# Patient Record
Sex: FEMALE | Race: WHITE | Employment: UNEMPLOYED | URBAN - METROPOLITAN AREA
[De-identification: names, ages, dates, MRNs, and addresses within clinical notes are randomized per-mention and may not be internally consistent; named-entity substitution may affect disease eponyms.]

---

## 2019-09-06 LAB
ANION GAP SERPL CALCULATED.3IONS-SCNC: 13 MMOL/L (ref 10–20)
BICARBONATE: 28 MMOL/L (ref 21–32)
CALCIUM SERPL-MCNC: 9.5 MG/DL (ref 8.6–10.3)
CHLORIDE BLD-SCNC: 99 MMOL/L (ref 98–107)
CREAT SERPL-MCNC: 0.91 MG/DL (ref 0.5–1)
ERYTHROCYTE [DISTWIDTH] IN BLOOD BY AUTOMATED COUNT: 16.8 % (ref 11.5–14)
GFR AFRICAN AMERICAN: >60 ML/MIN/1.73M2
GFR NON-AFRICAN AMERICAN: >60 ML/MIN/1.73M2
GLUCOSE: 245 MG/DL (ref 74–99)
HCT VFR BLD CALC: 36.5 % (ref 36–46)
HEMOGLOBIN: 10.7 G/DL (ref 12–16)
MCHC RBC AUTO-ENTMCNC: 29.3 G/DL (ref 32–36)
MCV RBC AUTO: 77 FL (ref 80–100)
PLATELET # BLD: 492 X10E9/L (ref 150–450)
POTASSIUM SERPL-SCNC: 3.9 MMOL/L (ref 3.5–5.3)
RBC # BLD: 4.74 X10E12/L (ref 4–5.2)
SODIUM BLD-SCNC: 136 MMOL/L (ref 136–145)
UREA NITROGEN: 16 MG/DL (ref 6–23)
WBC: 8.6 X10E9/L (ref 4.4–11.3)

## 2019-09-13 LAB
ANION GAP SERPL CALCULATED.3IONS-SCNC: 19 MMOL/L (ref 10–20)
BICARBONATE: 25 MMOL/L (ref 21–32)
CALCIUM SERPL-MCNC: 9.8 MG/DL (ref 8.6–10.3)
CHLORIDE BLD-SCNC: 99 MMOL/L (ref 98–107)
CREAT SERPL-MCNC: 0.94 MG/DL (ref 0.5–1)
ERYTHROCYTE [DISTWIDTH] IN BLOOD BY AUTOMATED COUNT: 16.8 % (ref 11.5–14)
GFR AFRICAN AMERICAN: 73 ML/MIN/1.73M2
GFR NON-AFRICAN AMERICAN: 60 ML/MIN/1.73M2
GLUCOSE: 138 MG/DL (ref 74–99)
HCT VFR BLD CALC: 36.2 % (ref 36–46)
HEMOGLOBIN: 10.6 G/DL (ref 12–16)
MCHC RBC AUTO-ENTMCNC: 29.3 G/DL (ref 32–36)
MCV RBC AUTO: 78 FL (ref 80–100)
PLATELET # BLD: 486 X10E9/L (ref 150–450)
POTASSIUM SERPL-SCNC: 4.3 MMOL/L (ref 3.5–5.3)
RBC # BLD: 4.63 X10E12/L (ref 4–5.2)
SODIUM BLD-SCNC: 139 MMOL/L (ref 136–145)
UREA NITROGEN: 17 MG/DL (ref 6–23)
WBC: 7.3 X10E9/L (ref 4.4–11.3)

## 2019-09-19 ENCOUNTER — OFFICE VISIT (OUTPATIENT)
Dept: INFECTIOUS DISEASES | Age: 59
End: 2019-09-19

## 2019-09-19 VITALS
SYSTOLIC BLOOD PRESSURE: 186 MMHG | TEMPERATURE: 98.2 F | DIASTOLIC BLOOD PRESSURE: 85 MMHG | HEART RATE: 75 BPM | RESPIRATION RATE: 16 BRPM | HEIGHT: 66 IN | WEIGHT: 261.6 LBS | BODY MASS INDEX: 42.04 KG/M2

## 2019-09-19 DIAGNOSIS — E11.628 DIABETIC INFECTION OF LEFT FOOT (HCC): Primary | ICD-10-CM

## 2019-09-19 DIAGNOSIS — L08.9 DIABETIC INFECTION OF LEFT FOOT (HCC): Primary | ICD-10-CM

## 2019-09-19 PROCEDURE — 99213 OFFICE O/P EST LOW 20 MIN: CPT | Performed by: INTERNAL MEDICINE

## 2019-09-19 RX ORDER — GLIMEPIRIDE 4 MG/1
4 TABLET ORAL
COMMUNITY
End: 2020-08-11

## 2019-09-19 ASSESSMENT — ENCOUNTER SYMPTOMS
GASTROINTESTINAL NEGATIVE: 1
RESPIRATORY NEGATIVE: 1

## 2019-09-20 LAB
ANION GAP SERPL CALCULATED.3IONS-SCNC: 14 MMOL/L (ref 10–20)
BICARBONATE: 26 MMOL/L (ref 21–32)
CALCIUM SERPL-MCNC: 9.7 MG/DL (ref 8.6–10.3)
CHLORIDE BLD-SCNC: 101 MMOL/L (ref 98–107)
CREAT SERPL-MCNC: 0.89 MG/DL (ref 0.5–1)
ERYTHROCYTE [DISTWIDTH] IN BLOOD BY AUTOMATED COUNT: 16.2 % (ref 11.5–14)
GFR AFRICAN AMERICAN: >60 ML/MIN/1.73M2
GFR NON-AFRICAN AMERICAN: >60 ML/MIN/1.73M2
GLUCOSE: 194 MG/DL (ref 74–99)
HCT VFR BLD CALC: 34.9 % (ref 36–46)
HEMOGLOBIN: 10.3 G/DL (ref 12–16)
MCHC RBC AUTO-ENTMCNC: 29.5 G/DL (ref 32–36)
MCV RBC AUTO: 77 FL (ref 80–100)
PLATELET # BLD: 454 X10E9/L (ref 150–450)
POTASSIUM SERPL-SCNC: 4.4 MMOL/L (ref 3.5–5.3)
RBC # BLD: 4.52 X10E12/L (ref 4–5.2)
SODIUM BLD-SCNC: 137 MMOL/L (ref 136–145)
UREA NITROGEN: 15 MG/DL (ref 6–23)
WBC: 7.1 X10E9/L (ref 4.4–11.3)

## 2019-09-27 LAB
ANION GAP SERPL CALCULATED.3IONS-SCNC: 17 MMOL/L (ref 10–20)
BICARBONATE: 24 MMOL/L (ref 21–32)
CALCIUM SERPL-MCNC: 9.2 MG/DL (ref 8.6–10.3)
CHLORIDE BLD-SCNC: 101 MMOL/L (ref 98–107)
CREAT SERPL-MCNC: 1.03 MG/DL (ref 0.5–1)
ERYTHROCYTE [DISTWIDTH] IN BLOOD BY AUTOMATED COUNT: 16.5 % (ref 11.5–14)
GFR AFRICAN AMERICAN: 65 ML/MIN/1.73M2
GFR NON-AFRICAN AMERICAN: 54 ML/MIN/1.73M2
GLUCOSE: 179 MG/DL (ref 74–99)
HCT VFR BLD CALC: 37.4 % (ref 36–46)
HEMOGLOBIN: 11 G/DL (ref 12–16)
MCHC RBC AUTO-ENTMCNC: 29.4 G/DL (ref 32–36)
MCV RBC AUTO: 77 FL (ref 80–100)
PLATELET # BLD: 464 X10E9/L (ref 150–450)
POTASSIUM SERPL-SCNC: 4.5 MMOL/L (ref 3.5–5.3)
RBC # BLD: 4.83 X10E12/L (ref 4–5.2)
SODIUM BLD-SCNC: 137 MMOL/L (ref 136–145)
UREA NITROGEN: 22 MG/DL (ref 6–23)
WBC: 8.2 X10E9/L (ref 4.4–11.3)

## 2019-10-03 ENCOUNTER — OFFICE VISIT (OUTPATIENT)
Dept: INFECTIOUS DISEASES | Age: 59
End: 2019-10-03

## 2019-10-03 VITALS
BODY MASS INDEX: 41.3 KG/M2 | WEIGHT: 257 LBS | TEMPERATURE: 98.2 F | SYSTOLIC BLOOD PRESSURE: 158 MMHG | RESPIRATION RATE: 20 BRPM | HEART RATE: 79 BPM | HEIGHT: 66 IN | DIASTOLIC BLOOD PRESSURE: 80 MMHG

## 2019-10-03 DIAGNOSIS — L08.9 DIABETIC INFECTION OF LEFT FOOT (HCC): Primary | ICD-10-CM

## 2019-10-03 DIAGNOSIS — E11.628 DIABETIC INFECTION OF LEFT FOOT (HCC): Primary | ICD-10-CM

## 2019-10-03 PROCEDURE — 99213 OFFICE O/P EST LOW 20 MIN: CPT | Performed by: INTERNAL MEDICINE

## 2019-10-03 RX ORDER — DOXYCYCLINE HYCLATE 100 MG
100 TABLET ORAL 2 TIMES DAILY
Qty: 60 TABLET | Refills: 1 | Status: SHIPPED | OUTPATIENT
Start: 2019-10-03 | End: 2019-11-02

## 2019-10-03 ASSESSMENT — ENCOUNTER SYMPTOMS
GASTROINTESTINAL NEGATIVE: 1
RESPIRATORY NEGATIVE: 1

## 2020-08-02 ENCOUNTER — APPOINTMENT (OUTPATIENT)
Dept: GENERAL RADIOLOGY | Age: 60
DRG: 854 | End: 2020-08-02
Payer: COMMERCIAL

## 2020-08-02 ENCOUNTER — HOSPITAL ENCOUNTER (INPATIENT)
Age: 60
LOS: 2 days | Discharge: HOME | DRG: 854 | End: 2020-08-04
Attending: EMERGENCY MEDICINE | Admitting: INTERNAL MEDICINE
Payer: COMMERCIAL

## 2020-08-02 PROBLEM — S91.302A OPEN WOUND OF LEFT FOOT: Status: ACTIVE | Noted: 2020-08-02

## 2020-08-02 PROBLEM — N17.9 AKI (ACUTE KIDNEY INJURY) (HCC): Status: ACTIVE | Noted: 2020-08-02

## 2020-08-02 PROBLEM — L03.119 CELLULITIS OF FOOT: Status: ACTIVE | Noted: 2020-08-02

## 2020-08-02 LAB
ALBUMIN SERPL-MCNC: 4.1 G/DL (ref 3.5–4.6)
ALP BLD-CCNC: 88 U/L (ref 40–130)
ALT SERPL-CCNC: 28 U/L (ref 0–33)
ANION GAP SERPL CALCULATED.3IONS-SCNC: 13 MEQ/L (ref 9–15)
AST SERPL-CCNC: 24 U/L (ref 0–35)
BASOPHILS ABSOLUTE: 0.1 K/UL (ref 0–0.2)
BASOPHILS RELATIVE PERCENT: 0.5 %
BILIRUB SERPL-MCNC: 0.3 MG/DL (ref 0.2–0.7)
BUN BLDV-MCNC: 26 MG/DL (ref 8–23)
CALCIUM SERPL-MCNC: 9.4 MG/DL (ref 8.5–9.9)
CHLORIDE BLD-SCNC: 94 MEQ/L (ref 95–107)
CO2: 28 MEQ/L (ref 20–31)
CREAT SERPL-MCNC: 1.03 MG/DL (ref 0.5–0.9)
EOSINOPHILS ABSOLUTE: 0.2 K/UL (ref 0–0.7)
EOSINOPHILS RELATIVE PERCENT: 1.3 %
GFR AFRICAN AMERICAN: >60
GFR NON-AFRICAN AMERICAN: 54.2
GLOBULIN: 3.4 G/DL (ref 2.3–3.5)
GLUCOSE BLD-MCNC: 210 MG/DL (ref 60–115)
GLUCOSE BLD-MCNC: 219 MG/DL (ref 60–115)
GLUCOSE BLD-MCNC: 254 MG/DL (ref 60–115)
GLUCOSE BLD-MCNC: 269 MG/DL (ref 60–115)
GLUCOSE BLD-MCNC: 285 MG/DL (ref 70–99)
GLUCOSE BLD-MCNC: 303 MG/DL (ref 60–115)
HCT VFR BLD CALC: 38 % (ref 37–47)
HEMOGLOBIN: 12.3 G/DL (ref 12–16)
LYMPHOCYTES ABSOLUTE: 1.4 K/UL (ref 1–4.8)
LYMPHOCYTES RELATIVE PERCENT: 10.6 %
MCH RBC QN AUTO: 25.3 PG (ref 27–31.3)
MCHC RBC AUTO-ENTMCNC: 32.3 % (ref 33–37)
MCV RBC AUTO: 78.3 FL (ref 82–100)
MONOCYTES ABSOLUTE: 1.1 K/UL (ref 0.2–0.8)
MONOCYTES RELATIVE PERCENT: 8.1 %
NEUTROPHILS ABSOLUTE: 10.8 K/UL (ref 1.4–6.5)
NEUTROPHILS RELATIVE PERCENT: 79.5 %
PDW BLD-RTO: 15.9 % (ref 11.5–14.5)
PERFORMED ON: ABNORMAL
PLATELET # BLD: 380 K/UL (ref 130–400)
POTASSIUM SERPL-SCNC: 4.7 MEQ/L (ref 3.4–4.9)
PROCALCITONIN: 0.08 NG/ML (ref 0–0.15)
RBC # BLD: 4.85 M/UL (ref 4.2–5.4)
SODIUM BLD-SCNC: 135 MEQ/L (ref 135–144)
TOTAL PROTEIN: 7.5 G/DL (ref 6.3–8)
WBC # BLD: 13.5 K/UL (ref 4.8–10.8)

## 2020-08-02 PROCEDURE — 1210000000 HC MED SURG R&B

## 2020-08-02 PROCEDURE — 6370000000 HC RX 637 (ALT 250 FOR IP): Performed by: INTERNAL MEDICINE

## 2020-08-02 PROCEDURE — 6370000000 HC RX 637 (ALT 250 FOR IP): Performed by: HOSPITALIST

## 2020-08-02 PROCEDURE — 80053 COMPREHEN METABOLIC PANEL: CPT

## 2020-08-02 PROCEDURE — 6360000002 HC RX W HCPCS: Performed by: EMERGENCY MEDICINE

## 2020-08-02 PROCEDURE — 96365 THER/PROPH/DIAG IV INF INIT: CPT

## 2020-08-02 PROCEDURE — 99254 IP/OBS CNSLTJ NEW/EST MOD 60: CPT | Performed by: INTERNAL MEDICINE

## 2020-08-02 PROCEDURE — 84145 PROCALCITONIN (PCT): CPT

## 2020-08-02 PROCEDURE — 6360000002 HC RX W HCPCS: Performed by: HOSPITALIST

## 2020-08-02 PROCEDURE — 96375 TX/PRO/DX INJ NEW DRUG ADDON: CPT

## 2020-08-02 PROCEDURE — 6360000002 HC RX W HCPCS

## 2020-08-02 PROCEDURE — 36415 COLL VENOUS BLD VENIPUNCTURE: CPT

## 2020-08-02 PROCEDURE — 2580000003 HC RX 258: Performed by: HOSPITALIST

## 2020-08-02 PROCEDURE — 73630 X-RAY EXAM OF FOOT: CPT

## 2020-08-02 PROCEDURE — 85025 COMPLETE CBC W/AUTO DIFF WBC: CPT

## 2020-08-02 PROCEDURE — 99284 EMERGENCY DEPT VISIT MOD MDM: CPT

## 2020-08-02 PROCEDURE — 2580000003 HC RX 258: Performed by: EMERGENCY MEDICINE

## 2020-08-02 PROCEDURE — 6360000002 HC RX W HCPCS: Performed by: INTERNAL MEDICINE

## 2020-08-02 RX ORDER — ATORVASTATIN CALCIUM 20 MG/1
20 TABLET, FILM COATED ORAL DAILY
Status: DISCONTINUED | OUTPATIENT
Start: 2020-08-02 | End: 2020-08-04 | Stop reason: HOSPADM

## 2020-08-02 RX ORDER — POLYETHYLENE GLYCOL 3350 17 G/17G
17 POWDER, FOR SOLUTION ORAL DAILY PRN
Status: DISCONTINUED | OUTPATIENT
Start: 2020-08-02 | End: 2020-08-04 | Stop reason: HOSPADM

## 2020-08-02 RX ORDER — PROMETHAZINE HYDROCHLORIDE 12.5 MG/1
12.5 TABLET ORAL EVERY 6 HOURS PRN
Status: DISCONTINUED | OUTPATIENT
Start: 2020-08-02 | End: 2020-08-04 | Stop reason: HOSPADM

## 2020-08-02 RX ORDER — ACETAMINOPHEN 325 MG/1
650 TABLET ORAL EVERY 6 HOURS PRN
Status: DISCONTINUED | OUTPATIENT
Start: 2020-08-02 | End: 2020-08-04 | Stop reason: HOSPADM

## 2020-08-02 RX ORDER — DEXTROSE MONOHYDRATE 25 G/50ML
12.5 INJECTION, SOLUTION INTRAVENOUS PRN
Status: DISCONTINUED | OUTPATIENT
Start: 2020-08-02 | End: 2020-08-04 | Stop reason: HOSPADM

## 2020-08-02 RX ORDER — 0.9 % SODIUM CHLORIDE 0.9 %
1000 INTRAVENOUS SOLUTION INTRAVENOUS ONCE
Status: COMPLETED | OUTPATIENT
Start: 2020-08-02 | End: 2020-08-02

## 2020-08-02 RX ORDER — INSULIN GLARGINE 100 [IU]/ML
18 INJECTION, SOLUTION SUBCUTANEOUS NIGHTLY
Status: DISCONTINUED | OUTPATIENT
Start: 2020-08-02 | End: 2020-08-04 | Stop reason: HOSPADM

## 2020-08-02 RX ORDER — ONDANSETRON 2 MG/ML
4 INJECTION INTRAMUSCULAR; INTRAVENOUS EVERY 6 HOURS PRN
Status: DISCONTINUED | OUTPATIENT
Start: 2020-08-02 | End: 2020-08-04 | Stop reason: HOSPADM

## 2020-08-02 RX ORDER — INSULIN GLARGINE 100 [IU]/ML
12 INJECTION, SOLUTION SUBCUTANEOUS NIGHTLY
Status: DISCONTINUED | OUTPATIENT
Start: 2020-08-02 | End: 2020-08-02

## 2020-08-02 RX ORDER — INSULIN GLARGINE 100 [IU]/ML
18 INJECTION, SOLUTION SUBCUTANEOUS NIGHTLY
Status: DISCONTINUED | OUTPATIENT
Start: 2020-08-03 | End: 2020-08-02

## 2020-08-02 RX ORDER — DIPHENHYDRAMINE HYDROCHLORIDE 50 MG/ML
25 INJECTION INTRAMUSCULAR; INTRAVENOUS ONCE
Status: COMPLETED | OUTPATIENT
Start: 2020-08-02 | End: 2020-08-02

## 2020-08-02 RX ORDER — DIPHENHYDRAMINE HYDROCHLORIDE 50 MG/ML
INJECTION INTRAMUSCULAR; INTRAVENOUS
Status: COMPLETED
Start: 2020-08-02 | End: 2020-08-02

## 2020-08-02 RX ORDER — ATORVASTATIN CALCIUM 20 MG/1
20 TABLET, FILM COATED ORAL DAILY
COMMUNITY
End: 2020-08-11 | Stop reason: SINTOL

## 2020-08-02 RX ORDER — NICOTINE POLACRILEX 4 MG
15 LOZENGE BUCCAL PRN
Status: DISCONTINUED | OUTPATIENT
Start: 2020-08-02 | End: 2020-08-04 | Stop reason: HOSPADM

## 2020-08-02 RX ORDER — SODIUM CHLORIDE 0.9 % (FLUSH) 0.9 %
10 SYRINGE (ML) INJECTION PRN
Status: DISCONTINUED | OUTPATIENT
Start: 2020-08-02 | End: 2020-08-04 | Stop reason: HOSPADM

## 2020-08-02 RX ORDER — LISINOPRIL 20 MG/1
20 TABLET ORAL DAILY
Status: DISCONTINUED | OUTPATIENT
Start: 2020-08-02 | End: 2020-08-04 | Stop reason: HOSPADM

## 2020-08-02 RX ORDER — ACETAMINOPHEN 650 MG/1
650 SUPPOSITORY RECTAL EVERY 6 HOURS PRN
Status: DISCONTINUED | OUTPATIENT
Start: 2020-08-02 | End: 2020-08-04 | Stop reason: HOSPADM

## 2020-08-02 RX ORDER — DEXTROSE MONOHYDRATE 50 MG/ML
100 INJECTION, SOLUTION INTRAVENOUS PRN
Status: DISCONTINUED | OUTPATIENT
Start: 2020-08-02 | End: 2020-08-04 | Stop reason: HOSPADM

## 2020-08-02 RX ORDER — SODIUM CHLORIDE 0.9 % (FLUSH) 0.9 %
10 SYRINGE (ML) INJECTION EVERY 12 HOURS SCHEDULED
Status: DISCONTINUED | OUTPATIENT
Start: 2020-08-02 | End: 2020-08-04 | Stop reason: HOSPADM

## 2020-08-02 RX ADMIN — MEROPENEM 1 G: 1 INJECTION, POWDER, FOR SOLUTION INTRAVENOUS at 05:18

## 2020-08-02 RX ADMIN — INSULIN GLARGINE 12 UNITS: 100 INJECTION, SOLUTION SUBCUTANEOUS at 05:33

## 2020-08-02 RX ADMIN — INSULIN LISPRO 6 UNITS: 100 INJECTION, SOLUTION INTRAVENOUS; SUBCUTANEOUS at 12:08

## 2020-08-02 RX ADMIN — ACETAMINOPHEN 650 MG: 325 TABLET ORAL at 15:49

## 2020-08-02 RX ADMIN — MEROPENEM 1 G: 1 INJECTION, POWDER, FOR SOLUTION INTRAVENOUS at 20:48

## 2020-08-02 RX ADMIN — MEROPENEM 1 G: 1 INJECTION, POWDER, FOR SOLUTION INTRAVENOUS at 12:47

## 2020-08-02 RX ADMIN — SODIUM CHLORIDE 1000 ML: 9 INJECTION, SOLUTION INTRAVENOUS at 05:18

## 2020-08-02 RX ADMIN — INSULIN GLARGINE 18 UNITS: 100 INJECTION, SOLUTION SUBCUTANEOUS at 20:43

## 2020-08-02 RX ADMIN — DIPHENHYDRAMINE HYDROCHLORIDE 25 MG: 50 INJECTION INTRAMUSCULAR; INTRAVENOUS at 01:43

## 2020-08-02 RX ADMIN — LISINOPRIL 20 MG: 20 TABLET ORAL at 08:26

## 2020-08-02 RX ADMIN — ATORVASTATIN CALCIUM 20 MG: 20 TABLET, FILM COATED ORAL at 08:26

## 2020-08-02 RX ADMIN — VANCOMYCIN HYDROCHLORIDE 1250 MG: 5 INJECTION, POWDER, LYOPHILIZED, FOR SOLUTION INTRAVENOUS at 21:53

## 2020-08-02 RX ADMIN — VANCOMYCIN HYDROCHLORIDE 1750 MG: 5 INJECTION, POWDER, LYOPHILIZED, FOR SOLUTION INTRAVENOUS at 01:38

## 2020-08-02 RX ADMIN — ENOXAPARIN SODIUM 40 MG: 40 INJECTION SUBCUTANEOUS at 08:26

## 2020-08-02 RX ADMIN — DIPHENHYDRAMINE HYDROCHLORIDE 25 MG: 50 INJECTION, SOLUTION INTRAMUSCULAR; INTRAVENOUS at 21:52

## 2020-08-02 RX ADMIN — INSULIN LISPRO 4 UNITS: 100 INJECTION, SOLUTION INTRAVENOUS; SUBCUTANEOUS at 08:25

## 2020-08-02 RX ADMIN — Medication 10 ML: at 20:52

## 2020-08-02 ASSESSMENT — PAIN SCALES - GENERAL
PAINLEVEL_OUTOF10: 2
PAINLEVEL_OUTOF10: 0
PAINLEVEL_OUTOF10: 9
PAINLEVEL_OUTOF10: 4

## 2020-08-02 ASSESSMENT — ENCOUNTER SYMPTOMS
RESPIRATORY NEGATIVE: 1
GASTROINTESTINAL NEGATIVE: 1
COLOR CHANGE: 1
EYES NEGATIVE: 1

## 2020-08-02 ASSESSMENT — PAIN DESCRIPTION - DESCRIPTORS: DESCRIPTORS: ACHING

## 2020-08-02 ASSESSMENT — PAIN DESCRIPTION - ORIENTATION: ORIENTATION: LEFT

## 2020-08-02 ASSESSMENT — PAIN DESCRIPTION - LOCATION: LOCATION: FOOT

## 2020-08-02 ASSESSMENT — PAIN DESCRIPTION - FREQUENCY: FREQUENCY: CONTINUOUS

## 2020-08-02 ASSESSMENT — PAIN DESCRIPTION - PAIN TYPE: TYPE: ACUTE PAIN

## 2020-08-02 NOTE — CONSULTS
Infectious Disease     Patient Name: Allen Mcmillan  Date: 8/2/2020  YOB: 1960  Medical Record Number: [id number]        Left diabetic foot infection      History of Present Illness:    Diabetes hypertension SVT hyperlipidemia obesity  Chronic foot wound    Patient was treated 8 months ago for diabetic foot infection and abscess received a course of IV antibiotics then placed on oral doxycycline for 2-month        Presents the emergency room with pain of left foot with some warmth and redness extending to the leg    Foot wound had closed but day of admission she had noticed that there is a new opening with increasing redness swelling some drainage    No fevers chills  Blood cell count elevated 13,500  Procalcitonin 0.08      EXAMINATION: XR FOOT LEFT (MIN 3 VIEWS)         CLINICAL HISTORY: ULCER RIGHT FOOT         COMPARISONS: None available.         FINDINGS: Mild diffuse osteopenia. No fracture, dislocation, bone lesion. No radiopaque foreign body. Spurring plantar and posterior surfaces calcaneus.              Impression    NO RADIOGRAPHIC EVIDENCE OF OSTEOMYELITIS. DEGENERATIVE CHANGE IS NOTED LEFT HEEL. Review of Systems   Constitutional: Positive for fever. Negative for chills, diaphoresis and fatigue. HENT: Negative. Eyes: Negative. Respiratory: Negative. Cardiovascular: Negative. Gastrointestinal: Negative. Endocrine: Negative. Genitourinary: Negative. Musculoskeletal: Negative. Skin: Positive for color change and wound. Neurological: Negative.         Review of Systems: All 14 review of systems negative other than as stated above    Social History     Tobacco Use    Smoking status: Never Smoker    Smokeless tobacco: Never Used   Substance Use Topics    Alcohol use: Yes     Comment: socailly     Drug use: Never         Past Medical History:   Diagnosis Date    Depression     DM type 2 (diabetes mellitus, type 2) ([facility] [address]     Hyperlipidemia     Hypertension     SVT (supraventricular tachycardia) (HCC)            Past Surgical History:   Procedure Laterality Date    ABLATION OF DYSRHYTHMIC FOCUS      EYE SURGERY      FOOT SURGERY Right          No current facility-administered medications on file prior to encounter. Current Outpatient Medications on File Prior to Encounter   Medication Sig Dispense Refill    mupirocin (BACTROBAN) 2 % ointment Apply 22 g topically 3 times daily Apply topically 3 times daily.  metFORMIN (GLUCOPHAGE) 1000 MG tablet Take 2,000 mg by mouth 2 times daily (with meals)      glimepiride (AMARYL) 4 MG tablet Take 4 mg by mouth every morning (before breakfast)      Insulin Glargine (BASAGLAR KWIKPEN SC) Inject 12 Units into the skin nightly       atorvastatin (LIPITOR) 20 MG tablet Take 20 mg by mouth daily      LISINOPRIL PO Take 20 mg by mouth daily          Allergies   Allergen Reactions    Penicillins     Sulfa Antibiotics          Family History   Family history unknown: Yes         Physical Exam:      Physical Exam   Constitutional: No distress. HENT:   Head: Normocephalic. Eyes: Pupils are equal, round, and reactive to light. Neck: Normal range of motion. Neck supple. No JVD present. No tracheal deviation present. No thyromegaly present. Cardiovascular: Normal heart sounds. No murmur heard. Pulmonary/Chest: Effort normal and breath sounds normal. No respiratory distress. She has no wheezes. She has no rales. She exhibits no tenderness. Abdominal: Soft. Bowel sounds are normal. She exhibits no distension and no mass. There is no abdominal tenderness. There is no rebound and no guarding. Musculoskeletal:         General: Tenderness and edema present. Legs:         Feet:    Lymphadenopathy:     She has no cervical adenopathy. Neurological: She is alert. Skin: Skin is warm. She is not diaphoretic. There is erythema.        Blood pressure (!) 130/57, pulse 71, temperature 98.2 °F (36.8 °C), temperature source Oral, resp. rate 16, height 5' 6\" (1.676 m), weight 266 lb (120.7 kg), SpO2 96 %.       .   Lab Results   Component Value Date    WBC 13.5 (H) 08/02/2020    HGB 12.3 08/02/2020    HCT 38.0 08/02/2020    MCV 78.3 (L) 08/02/2020     08/02/2020     Lab Results   Component Value Date     08/02/2020    K 4.7 08/02/2020    CL 94 08/02/2020    CO2 28 08/02/2020    BUN 26 08/02/2020    CREATININE 1.03 08/02/2020    GLUCOSE 285 08/02/2020    GLUCOSE 179 09/27/2019    CALCIUM 9.4 08/02/2020                ASSESSMENT:  Patient Active Problem List   Diagnosis    Cellulitis of foot    Open wound of left foot    DM type 2 (diabetes mellitus, type 2) ([facility] [address]    Hypertension    YESENIA (acute kidney injury) ([facility] [address]         PLAN:  Left diabetic foot infection  Underlying osteomyelitis    MRI of left foot    May Need surgical debridement    Continue vancomycin meropenem at this point

## 2020-08-02 NOTE — PROGRESS NOTES
Pharmacy Note  Vancomycin Consult    Sarah Rgoers is a 56 y.o. female started on Vancomycin for open foot wound; consult received from [address] to manage therapy. Also receiving the following antibiotics: meropenem. Patient Active Problem List   Diagnosis    Cellulitis of foot    Open wound of left foot    DM type 2 (diabetes mellitus, type 2) ([facility])    Hypertension    YESENIA (acute kidney injury) ([facility] [address]       Allergies:  Penicillins and Sulfa antibiotics     Temp max: 97.7F    Recent Labs     08/02/20  0115   BUN 26*       Recent Labs     08/02/20  0115   CREATININE 1.03*       Recent Labs     08/02/20  0115   WBC 13.5*       No intake or output data in the 24 hours ending 08/02/20 0673    Culture Date      Source                       Results  N/A    Ht Readings from Last 1 Encounters:   08/02/20 5' 6\" (1.676 m)        Wt Readings from Last 1 Encounters:   08/02/20 266 lb (120.7 kg)         Body mass index is 42.93 kg/m². Creatinine Clearance 53 ml/min using ideal body weight 58kg, SrCr 1.03. Goal Trough Level: 10-20 mcg/mL    Assessment/Plan:  Will initiate Vancomycin with a one time loading dose of 1750 mg x1 given in ER, followed by 1250 mg IV every 18 hours. Trough prior to 4th total dose 8/4/20 0800. Timing of trough level will be determined based on culture results, renal function, and clinical response. Thank you for the consult. Will continue to follow. ROMA Braun. Ph.  8/2/2020  5:11 AM

## 2020-08-02 NOTE — ED NOTES
[place] [employer] at bedside to collect belongings that patient requested to be locked away.       Jarrett De La Torre RN  08/02/20 8308

## 2020-08-02 NOTE — CARE COORDINATION
[facility] Case Management Initial Discharge Assessment    Met with Patient to discuss discharge plan. PCP: Eneida Escalona                                Date of Last Visit: 2MOTHS AGO    If no PCP, list provided? N/A    Discharge Planning    Living Arrangements: independently at home    Who do you live with?  AND MOTHER    Who helps you with your care:  self    If lives at home:     Do you have any barriers navigating in your home? no    Patient can perform ADL? Yes    Current Services (outpatient and in home) :  None    Dialysis: No    Is transportation available to get to your appointments? Yes    DME Equipment:  no    Respiratory equipment: None    Respiratory provider:  no     Pharmacy:  yes - [facility] with Medication Assistance Program?  No      Patient agreeable to [employer]? Yes, Company IF NEEDED-DEPENDS ON OUTCOME OF THERAPY    Patient agreeable to SNF/Rehab? Declined    Other discharge needs identified? N/A    Freedom of choice list provided with basic dialogue that supports the patient's individualized plan of care/goals and shares the quality data associated with the providers. Yes    Does Patient Have a High-Risk for Readmission Diagnosis (CHF, PN, MI, COPD)? No      The plan for Transition of Care is related to the following treatment goals:WOUND CARE, IV ABX, MRI, LABS, ID CONSULT    Initial Discharge Plan? (Note: please see concurrent daily documentation for any updates after initial note).     HOME, MAY REQUIRE IV ABX, WILL NEED TO FOLLOW FOR ID PLAN    The Patient and/or patient representative: Marika Richey was provided with choice of any post-acute providers for care and equipment and agrees with discharge plan  Yes    Electronically signed by Sade Acosta RN on 8/2/2020 at 12:09 PM

## 2020-08-02 NOTE — ED PROVIDER NOTES
Sulfa antibiotics    FAMILY HISTORY     History reviewed. No pertinent family history. SOCIAL HISTORY       Social History     Socioeconomic History    Marital status:      Spouse name: None    Number of children: None    Years of education: None    Highest education level: None   Occupational History    None   Social Needs    Financial resource strain: None    Food insecurity     Worry: None     Inability: None    Transportation needs     Medical: None     Non-medical: None   Tobacco Use    Smoking status: Never Smoker    Smokeless tobacco: Never Used   Substance and Sexual Activity    Alcohol use: Yes     Comment: socailly     Drug use: Never    Sexual activity: None   Lifestyle    Physical activity     Days per week: None     Minutes per session: None    Stress: None   Relationships    Social connections     Talks on phone: None     Gets together: None     Attends Holiness service: None     Active member of club or organization: None     Attends meetings of clubs or organizations: None     Relationship status: None    Intimate partner violence     Fear of current or ex partner: None     Emotionally abused: None     Physically abused: None     Forced sexual activity: None   Other Topics Concern    None   Social History Narrative    None       SCREENINGS               PHYSICAL EXAM    (up to 7 for level 4, 8 or more for level 5)     ED Triage Vitals [08/02/20 0054]   BP Temp Temp Source Pulse Resp SpO2 Height Weight   (!) 160/76 98.8 °F (37.1 °C) Oral 97 20 -- 5' 6\" (1.676 m) 252 lb (114.3 kg)       Physical Exam  Vitals signs and nursing note reviewed. Constitutional:       General: She is not in acute distress. Appearance: Normal appearance. She is well-developed. She is obese. HENT:      Head: Normocephalic and atraumatic. Mouth/Throat:      Mouth: Mucous membranes are moist.      Pharynx: Oropharynx is clear.    Eyes:      Extraocular Movements: Extraocular movements intact. Conjunctiva/sclera: Conjunctivae normal.   Neck:      Musculoskeletal: Normal range of motion and neck supple. Cardiovascular:      Rate and Rhythm: Normal rate and regular rhythm. Pulmonary:      Effort: Pulmonary effort is normal.      Breath sounds: Normal breath sounds. Abdominal:      General: Bowel sounds are normal.      Palpations: Abdomen is soft. Musculoskeletal: Normal range of motion. General: No deformity. Comments: Wound to lateral aspect of L foot with erythema and induration extending up to the midleg; 2+ dp/pt pulses   Skin:     General: Skin is warm and dry. Capillary Refill: Capillary refill takes less than 2 seconds. Neurological:      General: No focal deficit present. Mental Status: She is alert and oriented to person, place, and time. Mental status is at baseline. Cranial Nerves: No cranial nerve deficit. Psychiatric:         Thought Content:  Thought content normal.         DIAGNOSTIC RESULTS     EKG: All EKG's are interpreted by the Emergency Department Physician who either signs or Co-signs this chart in the absence of a cardiologist.    RADIOLOGY:   Non-plain film images such as CT, Ultrasound and MRI are read by the radiologist. Plain radiographic images are visualized and preliminarily interpreted by the emergency physician with the below findings:    L foot- no acute fracture    Interpretation per the Radiologist below, if available at the time of this note:    XR FOOT LEFT (MIN 3 VIEWS)    (Results Pending)       LABS:  Labs Reviewed   COMPREHENSIVE METABOLIC PANEL - Abnormal; Notable for the following components:       Result Value    Chloride 94 (*)     Glucose 285 (*)     BUN 26 (*)     CREATININE 1.03 (*)     GFR Non- 54.2 (*)     All other components within normal limits   CBC WITH AUTO DIFFERENTIAL - Abnormal; Notable for the following components:    WBC 13.5 (*)     MCV 78.3 (*)     MCH 25.3 (*)     MCHC 32.3 (*)     RDW 15.9 (*)     Neutrophils Absolute 10.8 (*)     Monocytes Absolute 1.1 (*)     All other components within normal limits       All other labs were within normal range or not returned as of this dictation. EMERGENCY DEPARTMENT COURSE and DIFFERENTIAL DIAGNOSIS/MDM:   Vitals:    Vitals:    08/02/20 0054   BP: (!) 160/76   Pulse: 97   Resp: 20   Temp: 98.8 °F (37.1 °C)   TempSrc: Oral   Weight: 252 lb (114.3 kg)   Height: 5' 6\" (1.676 m)       MDM  Number of Diagnoses or Management Options  Cellulitis of left lower extremity:   Hyperglycemia:   Diagnosis management comments: 24-year-old female presenting with left foot wound and cellulitis extending up the foot. Basic labs show elevated white blood cell count, also noted to be hyperglycemic likely secondary to the infection. Treated with IV Vanco.  Stable throughout ED course. Spoke to Dr. Della Herman who accepts patient to hospitalist service. Procedures    CRITICAL CARE TIME   Total Critical Care time was 0 minutes, excluding separately reportable procedures. There was a high probability of clinically significant/life threatening deterioration in the patient's condition which required my urgent intervention. FINAL IMPRESSION      1. Cellulitis of left lower extremity    2.  Hyperglycemia          DISPOSITION/PLAN   DISPOSITION Decision To Admit 08/02/2020 02:30:37 AM      (Please note that portions of this note were completed with a voice recognition program.  Efforts were made to edit the dictations but occasionally words are mis-transcribed.)    Konrad Harper MD (electronically signed)  Attending Emergency Physician        Konrad Harper MD  08/02/20 1341

## 2020-08-02 NOTE — H&P
[place] [address] MEDICINE    HISTORY AND PHYSICAL EXAM    PATIENT NAME:  Jr Catalan    MRN:  [id number]  SERVICE DATE:  8/2/2020   SERVICE TIME:  4:27 AM    Primary Care Physician: Cristo Littlejohn         SUBJECTIVE  CHIEF COMPLAINT: Left lateral foot wound    HPI:  This is a 56 y.o. female who presents with significant PMH obesity, DM, HLD, HTN, SVT with ablation; patient presented to the ER tonight with a painful left lateral foot wound just proximal to the toes associated with some erythema and warmth radiating up the anterior aspect of the left leg up just past the mid calf. Patient denies fever or chills. Modifying factors include pressure on the wound, denies alleviating factors. Patient has been having ongoing issues with foot wounds and follows Dr. Alcides Cuellar from podiatry. On the distal plantar aspect of the left foot patient had a wound that just recently closed and is callused over however short time ago she noticed an area of erythema to the lateral aspect of the left foot just proximal to the toes however there was nothing open or spreading erythema. Patient states she noticed yesterday morning that there was slightly open wound and it looks like her shoe had been rubbing on the area which opened the skin up. Patient noticed the erythema going up the anterior aspect of her foot and leg shortly after that. Left leg is significantly warmer to touch compared to the right leg. In the past patient was treated with meropenem and vancomycin for positive cultures. Patient does have allergy to penicillins. Patient has no other complaints at this time. Patient denies recent travel or known exposure to COVID-19. Patient also denies cough, sore throat, chest pain, shortness of breath, palpitations, headache, dizziness lightheadedness, vision changes, abdominal pain, nausea, vomiting, diarrhea, dysuria, numbness or tingling, tremors, falls.     PAST MEDICAL HISTORY:    Past Medical History:   Diagnosis Date    Depression     DM type 2 [facility]di[facility] type 2) [facility]     Hyperlipidemia     Hypertension     SVT (supraventricular tachycardia) ([facility])      PAST SURGICAL HISTORY:    Past Surgical History:   Procedure Laterality Date    ABLATION OF DYSRHYTHMIC FOCUS      EYE SURGERY      FOOT SURGERY Right      FAMILY HISTORY:    Family History   Family history unknown: Yes     SOCIAL HISTORY:    Social History     Socioeconomic History    Marital status:      Spouse name: Not on file    Number of children: Not on file    Years of education: Not on file    Highest education level: Not on file   Occupational History    Not on file   Social Needs    Financial resource strain: Not on file    Food insecurity     Worry: Not on file     Inability: Not on file    Transportation needs     Medical: Not on file     Non-medical: Not on file   Tobacco Use    Smoking status: Never Smoker    Smokeless tobacco: Never Used   Substance and Sexual Activity    Alcohol use: Yes     Comment: socailly     Drug use: Never    Sexual activity: Not on file   Lifestyle    Physical activity     Days per week: Not on file     Minutes per session: Not on file    Stress: Not on file   Relationships    Social connections     Talks on phone: Not on file     Gets together: Not on file     Attends Gnosticist service: Not on file     Active member of club or organization: Not on file     Attends meetings of clubs or organizations: Not on file     Relationship status: Not on file    Intimate partner violence     Fear of current or ex partner: Not on file     Emotionally abused: Not on file     Physically abused: Not on file     Forced sexual activity: Not on file   Other Topics Concern    Not on file   Social History Narrative    Not on file     MEDICATIONS:   Prior to Admission medications    Medication Sig Start Date End Date Taking?  Authorizing Provider   mupirocin (BACTROBAN) 2 % ointment Apply 22 g vancomycin and Merrem for positive cultures, prior foot wound on left foot has healed however patient has new foot wound now with cellulitis going up the anterior part of the left leg, mild leukocytosis, afebrile  Plan:   Admit  Consult infectious disease  Patient to follow-up as outpatient with podiatry  Consult wound care  IV vancomycin and Merrem pending cultures  Reed around area of erythema to assess growth      YESENIA (acute kidney injury)   Assessment: BUN 26, creatinine 1.03, prior renal function normal, could be due to ACE inhibitor, patient does not appear dry  Plan:   Hold lisinopril  IV fluids x1 L  Reassess renal function in the morning      DM type 2 (diabetes mellitus, type 2)   Assessment: With hyperglycemia glucose in the 200s  Plan:   Cardiac and diabetic diet  Accu-Cheks AC at bedtime with SSI  Hypoglycemia protocol      Hypertension  Assessment: Chronic  Plan:   Monitor vital signs  Continue home medication, hold lisinopril until renal function normalizes        Plan of care discussed with: patient    SIGNATURE: DIMA La - CNP  DATE: August 2, 2020  TIME: 4:27 AM     Dr. Clement Llanes MD - supervising physician

## 2020-08-02 NOTE — PLAN OF CARE
Problem: Pain:  Goal: Pain level will decrease  Description: Pain level will decrease  8/2/2020 1713 by Krystyna Gonzales RN  Outcome: Ongoing  8/2/2020 3275 by Aaron Carl RN  Outcome: Ongoing  Goal: Control of acute pain  Description: Control of acute pain  Outcome: Ongoing  Goal: Control of chronic pain  Description: Control of chronic pain  Outcome: Ongoing     Problem: Falls - Risk of:  Goal: Will remain free from falls  Description: Will remain free from falls  Outcome: Ongoing  Goal: Absence of physical injury  Description: Absence of physical injury  Outcome: Ongoing

## 2020-08-03 ENCOUNTER — APPOINTMENT (OUTPATIENT)
Dept: MRI IMAGING | Age: 60
DRG: 854 | End: 2020-08-03
Payer: COMMERCIAL

## 2020-08-03 LAB
ANION GAP SERPL CALCULATED.3IONS-SCNC: 15 MEQ/L (ref 9–15)
BASOPHILS ABSOLUTE: 0 K/UL (ref 0–0.2)
BASOPHILS RELATIVE PERCENT: 0.4 %
BUN BLDV-MCNC: 16 MG/DL (ref 8–23)
CALCIUM SERPL-MCNC: 9.1 MG/DL (ref 8.5–9.9)
CHLORIDE BLD-SCNC: 102 MEQ/L (ref 95–107)
CO2: 23 MEQ/L (ref 20–31)
CREAT SERPL-MCNC: 0.83 MG/DL (ref 0.5–0.9)
EOSINOPHILS ABSOLUTE: 0.3 K/UL (ref 0–0.7)
EOSINOPHILS RELATIVE PERCENT: 2.9 %
GFR AFRICAN AMERICAN: >60
GFR NON-AFRICAN AMERICAN: >60
GLUCOSE BLD-MCNC: 230 MG/DL (ref 70–99)
GLUCOSE BLD-MCNC: 236 MG/DL (ref 60–115)
GLUCOSE BLD-MCNC: 250 MG/DL (ref 60–115)
GLUCOSE BLD-MCNC: 262 MG/DL (ref 60–115)
GLUCOSE BLD-MCNC: 299 MG/DL (ref 60–115)
HCT VFR BLD CALC: 35.3 % (ref 37–47)
HEMOGLOBIN: 11.5 G/DL (ref 12–16)
LYMPHOCYTES ABSOLUTE: 1.6 K/UL (ref 1–4.8)
LYMPHOCYTES RELATIVE PERCENT: 15.3 %
MCH RBC QN AUTO: 25.8 PG (ref 27–31.3)
MCHC RBC AUTO-ENTMCNC: 32.6 % (ref 33–37)
MCV RBC AUTO: 79.2 FL (ref 82–100)
MONOCYTES ABSOLUTE: 1.4 K/UL (ref 0.2–0.8)
MONOCYTES RELATIVE PERCENT: 13 %
NEUTROPHILS ABSOLUTE: 7.2 K/UL (ref 1.4–6.5)
NEUTROPHILS RELATIVE PERCENT: 68.4 %
PDW BLD-RTO: 16 % (ref 11.5–14.5)
PERFORMED ON: ABNORMAL
PLATELET # BLD: 337 K/UL (ref 130–400)
POTASSIUM REFLEX MAGNESIUM: 4.6 MEQ/L (ref 3.4–4.9)
RBC # BLD: 4.45 M/UL (ref 4.2–5.4)
SODIUM BLD-SCNC: 140 MEQ/L (ref 135–144)
WBC # BLD: 10.5 K/UL (ref 4.8–10.8)

## 2020-08-03 PROCEDURE — 2580000003 HC RX 258: Performed by: HOSPITALIST

## 2020-08-03 PROCEDURE — A9579 GAD-BASE MR CONTRAST NOS,1ML: HCPCS | Performed by: INTERNAL MEDICINE

## 2020-08-03 PROCEDURE — 87205 SMEAR GRAM STAIN: CPT

## 2020-08-03 PROCEDURE — 99213 OFFICE O/P EST LOW 20 MIN: CPT

## 2020-08-03 PROCEDURE — 73720 MRI LWR EXTREMITY W/O&W/DYE: CPT

## 2020-08-03 PROCEDURE — 99232 SBSQ HOSP IP/OBS MODERATE 35: CPT | Performed by: INTERNAL MEDICINE

## 2020-08-03 PROCEDURE — 87075 CULTR BACTERIA EXCEPT BLOOD: CPT

## 2020-08-03 PROCEDURE — 87070 CULTURE OTHR SPECIMN AEROBIC: CPT

## 2020-08-03 PROCEDURE — 85025 COMPLETE CBC W/AUTO DIFF WBC: CPT

## 2020-08-03 PROCEDURE — 6370000000 HC RX 637 (ALT 250 FOR IP): Performed by: INTERNAL MEDICINE

## 2020-08-03 PROCEDURE — 0JBR0ZZ EXCISION OF LEFT FOOT SUBCUTANEOUS TISSUE AND FASCIA, OPEN APPROACH: ICD-10-PCS | Performed by: PODIATRIST

## 2020-08-03 PROCEDURE — 2580000003 HC RX 258: Performed by: INTERNAL MEDICINE

## 2020-08-03 PROCEDURE — 80048 BASIC METABOLIC PNL TOTAL CA: CPT

## 2020-08-03 PROCEDURE — 1210000000 HC MED SURG R&B

## 2020-08-03 PROCEDURE — 6360000004 HC RX CONTRAST MEDICATION: Performed by: INTERNAL MEDICINE

## 2020-08-03 PROCEDURE — 6360000002 HC RX W HCPCS: Performed by: HOSPITALIST

## 2020-08-03 PROCEDURE — 36415 COLL VENOUS BLD VENIPUNCTURE: CPT

## 2020-08-03 RX ORDER — SODIUM CHLORIDE 0.9 % (FLUSH) 0.9 %
10 SYRINGE (ML) INJECTION 2 TIMES DAILY
Status: DISCONTINUED | OUTPATIENT
Start: 2020-08-03 | End: 2020-08-04 | Stop reason: HOSPADM

## 2020-08-03 RX ADMIN — ATORVASTATIN CALCIUM 20 MG: 20 TABLET, FILM COATED ORAL at 08:51

## 2020-08-03 RX ADMIN — LISINOPRIL 20 MG: 20 TABLET ORAL at 08:51

## 2020-08-03 RX ADMIN — MEROPENEM 1 G: 1 INJECTION, POWDER, FOR SOLUTION INTRAVENOUS at 05:07

## 2020-08-03 RX ADMIN — VANCOMYCIN HYDROCHLORIDE 1250 MG: 5 INJECTION, POWDER, LYOPHILIZED, FOR SOLUTION INTRAVENOUS at 17:12

## 2020-08-03 RX ADMIN — Medication 10 ML: at 21:47

## 2020-08-03 RX ADMIN — MEROPENEM 1 G: 1 INJECTION, POWDER, FOR SOLUTION INTRAVENOUS at 21:46

## 2020-08-03 RX ADMIN — Medication 10 ML: at 14:52

## 2020-08-03 RX ADMIN — MEROPENEM 1 G: 1 INJECTION, POWDER, FOR SOLUTION INTRAVENOUS at 14:48

## 2020-08-03 RX ADMIN — ENOXAPARIN SODIUM 40 MG: 40 INJECTION SUBCUTANEOUS at 08:51

## 2020-08-03 RX ADMIN — INSULIN GLARGINE 18 UNITS: 100 INJECTION, SOLUTION SUBCUTANEOUS at 21:46

## 2020-08-03 RX ADMIN — GADOTERIDOL 20 ML: 279.3 INJECTION, SOLUTION INTRAVENOUS at 15:16

## 2020-08-03 ASSESSMENT — ENCOUNTER SYMPTOMS
GASTROINTESTINAL NEGATIVE: 1
COLOR CHANGE: 1
RESPIRATORY NEGATIVE: 1

## 2020-08-03 ASSESSMENT — PAIN SCALES - GENERAL
PAINLEVEL_OUTOF10: 0
PAINLEVEL_OUTOF10: 0

## 2020-08-03 NOTE — PROGRESS NOTES
mupirocin (BACTROBAN) 2 % ointment Apply 22 g topically 3 times daily Apply topically 3 times daily.  metFORMIN (GLUCOPHAGE) 1000 MG tablet Take 2,000 mg by mouth 2 times daily (with meals)      glimepiride (AMARYL) 4 MG tablet Take 4 mg by mouth every morning (before breakfast)      Insulin Glargine (BASAGLAR KWIKPEN SC) Inject 12 Units into the skin nightly       atorvastatin (LIPITOR) 20 MG tablet Take 20 mg by mouth daily      LISINOPRIL PO Take 20 mg by mouth daily          Objective    BP (!) 150/66   Pulse 62   Temp 98.2 °F (36.8 °C) (Oral)   Resp 16   Ht 5' 6\" (1.676 m)   Wt 266 lb (120.7 kg)   SpO2 97%   BMI 42.93 kg/m²     LABS:  WBC:    Lab Results   Component Value Date    WBC 10.5 08/03/2020     H/H:    Lab Results   Component Value Date    HGB 11.5 08/03/2020    HCT 35.3 08/03/2020     PTT:  No results found for: APTT, PTT[APTT}  PT/INR:  No results found for: PROTIME, INR  HgBA1c:  No results found for: LABA1C    Assessment   Christian Risk Score: Christian Scale Score: 23    Patient Active Problem List   Diagnosis    Cellulitis of foot    Open wound of left foot    DM type [facility] 2) ([facility]    Hypertension    [facility]       Measurements:  Wound 08/02/20 Foot Left;Lateral (Active)   Wound Image   08/03/20 1051   Wound Diabetic 08/03/20 1051   Dressing Status Changed 08/03/20 1051   Dressing Changed Changed/New 08/03/20 1051   Dressing/Treatment Barrier film;4x4;Roll gauze 08/03/20 1051   Wound Length (cm) 3 cm 08/03/20 1051   Wound Width (cm) 1.4 cm 08/03/20 1051   Wound Surface Area (cm^2) 4.2 cm^2 08/03/20 1051   Wound Assessment Fluid filled blister;Drainage 08/03/20 1051   Drainage Amount Scant 08/03/20 1051   Drainage Description Yellow 08/03/20 1051   Odor None 08/03/20 1051   Pema-wound Assessment Ecchymosis; Maceration 08/03/20 1051   Number of days: 1     Diabetic patient, recent history of wearing poor-fitting footwear which appears to have caused wound to the left lateral foot. Fluid filled bullae with bruising under the area, and yellow drainage noted when removing current dressing. Wound bed and surrounding tissue feels very soft. Pema wound skin is maceration. No odor noted. ID is on for cellulitis.  Wound dressing with skin protectant wipe, 4x4, and roll gauze    Plan   Plan of Care: Wound 08/02/20 Foot Left;Lateral-Dressing/Treatment: Barrier film, 4x4, Roll gauze    Specialty Bed Required : N/A   [] Low Air Loss   [] Pressure Redistribution  [] Fluid Immersion  [] Bariatric  [] Other:     Current Diet: DIET CARDIAC; Carb Control: 4 carb choices (60 gms)/meal  Dietician consult:  N/A    Discharge Plan:  Placement for patient upon discharge: home with support    Patient appropriate for Outpatient Wound Care Center: Horacio Gutierrez (podiatrist)     Referrals:  []   [] [address] [place] [employer] [facility]  [] Supplies  [] Other    Patient/Caregiver Teaching:  Level of patient/caregiver understanding able to:   [] Indicates understanding       [] Needs reinforcement  [] Unsuccessful      [] Verbal Understanding  [] Demonstrated understanding       [] No evidence of learning  [] Refused teaching         [x] N/A       Electronically signed by JOSESITO NuñezN, RN, CWOCN on 8/3/2020 at 11:15 AM

## 2020-08-03 NOTE — PROGRESS NOTES
Hospitalist Progress Note      Date of Admission: 8/2/2020  Chief Complaint:    Chief Complaint   Patient presents with    Foot Pain     left     Subjective:  No new complaints. No nausea, vomiting, chest pain, or headache      Medications:    Infusion Medications    dextrose       Scheduled Medications    sodium chloride flush  10 mL Intravenous 2 times per day    enoxaparin  40 mg Subcutaneous Daily    meropenem  1 g Intravenous Q8H    atorvastatin  20 mg Oral Daily    lisinopril  20 mg Oral Daily    vancomycin (VANCOCIN) intermittent dosing (placeholder)   Other RX Placeholder    vancomycin  1,250 mg Intravenous Q18H    insulin lispro  5 Units Subcutaneous TID WC    insulin lispro  0-6 Units Subcutaneous TID WC    insulin lispro  0-3 Units Subcutaneous Nightly    insulin glargine  18 Units Subcutaneous Nightly     PRN Meds: sodium chloride flush, acetaminophen **OR** acetaminophen, polyethylene glycol, promethazine **OR** ondansetron, glucose, dextrose, glucagon (rDNA), dextrose    Intake/Output Summary (Last 24 hours) at 8/3/2020 1137  Last data filed at 8/3/2020 0508  Gross per 24 hour   Intake 2944 ml   Output 425 ml   Net 2519 ml     Exam:  BP (!) 150/66   Pulse 62   Temp 98.2 °F (36.8 °C) (Oral)   Resp 16   Ht 5' 6\" (1.676 m)   Wt 266 lb (120.7 kg)   SpO2 97%   BMI 42.93 kg/m²   Head: Normocephalic, atraumatic  Sclera clear  Neck supple, nontender  Lungs: clear    Labs:   Recent Labs     08/02/20  0115 08/03/20  0617   WBC 13.5* 10.5   HGB 12.3 11.5*   HCT 38.0 35.3*    337     Recent Labs     08/02/20  0115 08/03/20  0617    140   K 4.7 4.6   CL 94* 102   CO2 28 23   BUN 26* 16   CREATININE 1.03* 0.83   CALCIUM 9.4 9.1   AST 24  --    ALT 28  --    BILITOT 0.3  --    ALKPHOS 88  --      No results for input(s): INR in the last 72 hours. No results for input(s): Keagan Malik in the last 72 hours.   Radiology:  XR FOOT LEFT (MIN 3 VIEWS)   Final Result   NO RADIOGRAPHIC EVIDENCE OF OSTEOMYELITIS. DEGENERATIVE CHANGE IS NOTED LEFT HEEL. MRI FOOT LEFT W CONTRAST    (Results Pending)     Assessment/Plan:    Sepsis secondary to Foot cellulitis, ruling out osteomyelitis. Swelling and redness has substantially improved based on skin markings when redness was present. Patient acknowledges substantial improvement. MRI results pending. Following with infectious disease. Diabetes: Continue to monitor Accu-Cheks, on Lantus and lispro. Hyperlipidemia: Continue Lipitor.     No medical barriers to discharge on antibiotics when cleared from  Infectious disease perspective    35 minutes total care time, >1/2 in unit/floor time and care coordination     Electronically signed by Kalani Hendricks MD on 8/3/2020 at 11:37 AM

## 2020-08-03 NOTE — PROGRESS NOTES
Infectious Disease     Patient Name: Nicole Vogt  Date: 8/3/2020  YOB: 1960  Medical Record Number: [id number]        Left diabetic foot infection      History of Present Illness:    Diabetes hypertension SVT hyperlipidemia obesity  Chronic foot wound    Patient was treated 8 months ago for diabetic foot infection and abscess received a course of IV antibiotics then placed on oral doxycycline for 2-month        Presents the emergency room with pain of left foot with some warmth and redness extending to the leg    Foot wound had closed but day of admission she had noticed that there is a new opening with increasing redness swelling some drainage    No fevers chills  Blood cell count elevated 13,500  Procalcitonin 0.08      EXAMINATION: XR FOOT LEFT (MIN 3 VIEWS)         CLINICAL HISTORY: ULCER RIGHT FOOT         COMPARISONS: None available.         FINDINGS: Mild diffuse osteopenia. No fracture, dislocation, bone lesion. No radiopaque foreign body. Spurring plantar and posterior surfaces calcaneus.              Impression    NO RADIOGRAPHIC EVIDENCE OF OSTEOMYELITIS. DEGENERATIVE CHANGE IS NOTED LEFT HEEL. Review of Systems   Constitutional: Negative for chills, diaphoresis, fatigue and fever. Respiratory: Negative. Cardiovascular: Negative. Gastrointestinal: Negative. Skin: Positive for color change and wound. Physical Exam   Cardiovascular: Normal heart sounds. No murmur heard. Pulmonary/Chest: Effort normal and breath sounds normal. No respiratory distress. She has no wheezes. She has no rales. She exhibits no tenderness. Abdominal: Soft. Bowel sounds are normal. She exhibits no distension. There is no abdominal tenderness. There is no rebound and no guarding. Musculoskeletal:         General: Tenderness and edema present. Legs:         Feet:    Neurological: She is alert. Skin: Skin is warm. There is erythema.        Blood pressure (!) 150/66, pulse 62, temperature 98.2 °F (36.8 °C), temperature source Oral, resp. rate 16, height 5' 6\" (1.676 m), weight 266 lb (120.7 kg), SpO2 97 %.       .   Lab Results   Component Value Date    WBC 10.5 08/03/2020    HGB 11.5 (L) 08/03/2020    HCT 35.3 (L) 08/03/2020    MCV 79.2 (L) 08/03/2020     08/03/2020     Lab Results   Component Value Date     08/03/2020    K 4.6 08/03/2020     08/03/2020    CO2 23 08/03/2020    BUN 16 08/03/2020    CREATININE 0.83 08/03/2020    GLUCOSE 230 08/03/2020    GLUCOSE 179 09/27/2019    CALCIUM 9.1 08/03/2020                ASSESSMENT:  Patient Active Problem List   Diagnosis    Cellulitis of foot    Open wound of left foot    DM type 2 (diabetes mellitus, type 2) ([facility] [address]    Hypertension    YESENIA (acute kidney injury) ([facility] [address]         PLAN:  Left diabetic foot infection  Underlying osteomyelitis    Mri pend    Continue vancomycin meropenem at this point

## 2020-08-03 NOTE — PROGRESS NOTES
Assessment complete this AM. VSS. Pt went for MRI of left foot today. Podiatry consulted, rounded, and cultures sent of left foot. Denies pain. Resting in bed now. Safety maintained. Call light in reach.  Electronically signed by Tom Flores RN on 8/3/2020 at 5:08 PM

## 2020-08-03 NOTE — CONSULTS
PODIATRIC MEDICINE AND SURGERY  CONSULT HISTORY AND PHYSICAL    Consulting Service:  Medicine  Requesting Provider: Dr. Deirdre Gamble MD  Opinion/advice regarding: Left foot ulceration/cellulitis   Staff Doctor:  Dr. Karol Charles:  56 y.o. female with PMH significant for obesity, diabetes, hypertension for who podiatry was consulted for Left foot cellulitis and bullae. Cellulitis most likely from foot wound. Cellulitic margin is improving. Fluid filled bullae on the lateral aspect of the foot was draining and skin was macerated was removed to decrase risk of nidus for infection. Debridement of plantar hyperkeratotic tissue revealed a pinpoint ulcer that tracks to lateral wound. PLAN AND RECOMMENDATIONS[de-identified]  Patient's case to be discussed with staff, Dr. Bard Santamaria, who will provide final recommendations going forward  Wound care: lateral wound dressed with xeroform, DSD, and janusz, plantar wound painted with betadine, DSD and janusz. Fluid filled bullae at the level of the dermis was removed with tissue scissors and pickups wound was irrigated with sterile saline. Hyperkeratotic tissue of the plantar aspect of the 5th met head was debrided at the level of the dermis with a 15 blade to reveal a pinpoint ulceration. WB heel in athletic shoe as tolerated  Elevate  at or above heart level while resting  Debridement  Excisional fluid filled bullae at the level of the dermis was removed with tissue scissors and pickups without incident   Hyperkeratotic tissue of the plantar aspect of the 5th met head was debrided at the level of the dermis without incident.    X-rays of the left foot ordered yesterday  Mri of the left foot ordered 8/3/2020   Wound culture from left foot bullae/left plantar wound obtained and sent to [facility] for culture and sensitivity  Antibiotic coverage per ID  Pain management per primary team   Podiatry to follow while in house   Patient will need follow up with Dr. Bard Santamaria within 7-10 days of discharge HPI: This 56y.o. year old female was seen today for fluid filled bullae. Patient States that she had an infection on the bottom of her foot couple of weeks ago. She received doxycycline from her outside podiatrist which helped heal that wound. Patient believes the wound is completely healed. Patient has been dealing with chronic ulcerations on her left foot for years. Patient states a new blister formed couple of days ago on the lateral aspect of her foot. She noticed redness extending to the ankle and up to her leg for which she went to the ED for. Patient denies nausea, vomiting, diarrhea, fevers, chills, chest pain, shortness of breath, head ache, or calf pain. No other pedal complaints. Past Medical History:   Diagnosis Date    Depression     DM type 2 (diabetes mellitus, type 2) ([facility])     Hyperlipidemia     Hypertension     SVT (supraventricular tachycardia) ([facility])        Past Surgical History:   Procedure Laterality Date    ABLATION OF DYSRHYTHMIC FOCUS      EYE SURGERY      FOOT SURGERY Right        No current facility-administered medications on file prior to encounter. Current Outpatient Medications on File Prior to Encounter   Medication Sig Dispense Refill    mupirocin (BACTROBAN) 2 % ointment Apply 22 g topically 3 times daily Apply topically 3 times daily.       metFORMIN (GLUCOPHAGE) 1000 MG tablet Take 2,000 mg by mouth 2 times daily (with meals)      glimepiride (AMARYL) 4 MG tablet Take 4 mg by mouth every morning (before breakfast)      Insulin Glargine (BASAGLAR KWIKPEN SC) Inject 12 Units into the skin nightly       atorvastatin (LIPITOR) 20 MG tablet Take 20 mg by mouth daily      LISINOPRIL PO Take 20 mg by mouth daily          Allergies   Allergen Reactions    Penicillins     Sulfa Antibiotics        Family History   Family history unknown: Yes       Social History     Socioeconomic History    Marital status:      Spouse name: Not on file    Number of children: Not on file    Years of education: Not on file    Highest education level: Not on file   Occupational History    Not on file   Social Needs    Financial resource strain: Not on file    Food insecurity     Worry: Not on file     Inability: Not on file    Transportation needs     Medical: Not on file     Non-medical: Not on file   Tobacco Use    Smoking status: Never Smoker    Smokeless tobacco: Never Used   Substance and Sexual Activity    Alcohol use: Yes     Comment: socailly     Drug use: Never    Sexual activity: Not on file   Lifestyle    Physical activity     Days per week: Not on file     Minutes per session: Not on file    Stress: Not on file   Relationships    Social connections     Talks on phone: Not on file     Gets together: Not on file     Attends [employer] service: Not on file     Active member of club or organization: Not on file     Attends meetings of clubs or organizations: Not on file     Relationship status: Not on file    Intimate partner violence     Fear of current or ex partner: Not on file     Emotionally abused: Not on file     Physically abused: Not on file     Forced sexual activity: Not on file   Other Topics Concern    Not on file   Social History Narrative    Not on file       Review of Systems  CONSTITUTIONAL: No fevers, chills, diaphoresis  HEENT: No epistaxis, tinnitus  EYES: No diplopia, blurry vision.   CARDIOVASCULAR:  No chest pain, palpitations, lower extremity edema  PULM: No dyspnea, tachypnea, wheezing  GI: No nausea, vomiting, constipation, diarrhea  : No dysuria, gross hematuria, or pyuria  NEURO:  No new balance problems, peripheral weakness, paresthesias, numbness  MSK: No pain  PSY: No concerns regarding depression, anxiety  INTEGUMENTARY:  open skin lesion to left lateral foot    OBJECTIVE:  BP (!) 150/66   Pulse 62   Temp 98.2 °F (36.8 °C) (Oral)   Resp 16   Ht 5' 6\" (1.676 m)   Wt 266 lb (120.7 kg)   SpO2 97%   BMI 42.93 kg/m² Patient is alert and oriented to person, place, and time    Vascular:   Palpable Dorsalis Pedis and Palpable Posterior Tibial Pulses B/L   Capillary Fill time < 3 seconds to B/L digits  Skin temperature warm to cool tibial tuberosity to the digits B/L  Hair growth present to digits  mild edema  No varicosities     Neurological:   Sensation to light touch impaired B/L  Protective sensation via monofilament testing impaired B/L    Musculoskeletal/Orthopaedic:   Structural Deformities: None noted  5/5 muscle strength Dorsiflexion, Plantarflexion, Inversion, Eversion B/L  Mild tenderness on palpation of lateral forefoot ulceration     Dermatological:   Skin appears well hydrated and supple with good temperature, texture, turgor  Plantar 5th met head hyperkeratotic lesions noted  Nails 1-5 B/L appear WNL  Interspaces 1-4 B/L are clear and without debris   open lesions noted of the L foot as below. Ulceration #1:   Location: Left lateral foot  Measurements: 2.5 cm x 1.5 cm x 0.1 cm  Base: Granular/Healthy  Borders:  extensive  macerated tissue. Exudate: minimal, serosanguinous, purulent drainage   Comments: + periwound erythema and edema extending beyond wound borders constistent with cellulitis with no evidence of ascending lymphangitis. Wound tracks to the plantar wound ~3 cm and doesn't not probe to bone or tendon    Ulceration #2  Location: Plantar 5th met head  Measurements: 0.2 cm x 0.2 cm x 0.2 cm  Base: Granular/Healthy  Borders:  Hyperkeratotic. Exudate: minimal, serosanguinous, purulent. drainage   Comments: No periwound erythema and edema extending beyond wound borders. with no evidence of ascending lymphangitis.  Wound tracks to the lateral wound ~3 cm and doesn't not probe to bone or tendon      LABS:   Lab Results   Component Value Date    WBC 10.5 08/03/2020    HGB 11.5 (L) 08/03/2020    HCT 35.3 (L) 08/03/2020    MCV 79.2 (L) 08/03/2020     08/03/2020     Lab Results   Component Value Date  08/03/2020    K 4.6 08/03/2020     08/03/2020    CO2 23 08/03/2020    BUN 16 08/03/2020    CREATININE 0.83 08/03/2020    GLUCOSE 230 08/03/2020    GLUCOSE 179 09/27/2019    CALCIUM 9.1 08/03/2020      Lab Results   Component Value Date    LABALBU 4.1 08/02/2020     No results found for: SEDRATE  No results found for: CRP  No results found for: LABA1C    MICROBIOLOGY:   Pending cultures. IMAGING:   X-ray (8/2/2020)  NO RADIOGRAPHIC EVIDENCE OF OSTEOMYELITIS. DEGENERATIVE CHANGE IS NOTED LEFT HEEL. MRI 8/3/2020  Soft tissue edema/phlegmon/cellulitis without loculated fluid collection.         No evidence for osteomyelitis.           Blu Rodriguez DPM PGY-1  Podiatric Surgery Resident  Podiatry On Call Pager: [phone]  August 3, 2020  4:12 PM

## 2020-08-04 VITALS
HEART RATE: 60 BPM | RESPIRATION RATE: 16 BRPM | BODY MASS INDEX: 42.75 KG/M2 | DIASTOLIC BLOOD PRESSURE: 76 MMHG | OXYGEN SATURATION: 96 % | WEIGHT: 266 LBS | HEIGHT: 66 IN | SYSTOLIC BLOOD PRESSURE: 167 MMHG | TEMPERATURE: 98.2 F

## 2020-08-04 LAB
ANION GAP SERPL CALCULATED.3IONS-SCNC: 14 MEQ/L (ref 9–15)
BASOPHILS ABSOLUTE: 0 K/UL (ref 0–0.2)
BASOPHILS RELATIVE PERCENT: 0.6 %
BUN BLDV-MCNC: 14 MG/DL (ref 8–23)
CALCIUM SERPL-MCNC: 9.3 MG/DL (ref 8.5–9.9)
CHLORIDE BLD-SCNC: 99 MEQ/L (ref 95–107)
CO2: 25 MEQ/L (ref 20–31)
CREAT SERPL-MCNC: 0.72 MG/DL (ref 0.5–0.9)
EOSINOPHILS ABSOLUTE: 0.5 K/UL (ref 0–0.7)
EOSINOPHILS RELATIVE PERCENT: 5.6 %
GFR AFRICAN AMERICAN: >60
GFR NON-AFRICAN AMERICAN: >60
GLUCOSE BLD-MCNC: 226 MG/DL (ref 70–99)
GLUCOSE BLD-MCNC: 234 MG/DL (ref 60–115)
GLUCOSE BLD-MCNC: 273 MG/DL (ref 60–115)
HBA1C MFR BLD: 9.3 % (ref 4.8–5.9)
HCT VFR BLD CALC: 36.7 % (ref 37–47)
HEMOGLOBIN: 11.9 G/DL (ref 12–16)
LYMPHOCYTES ABSOLUTE: 1.4 K/UL (ref 1–4.8)
LYMPHOCYTES RELATIVE PERCENT: 17.4 %
MCH RBC QN AUTO: 25.7 PG (ref 27–31.3)
MCHC RBC AUTO-ENTMCNC: 32.4 % (ref 33–37)
MCV RBC AUTO: 79.4 FL (ref 82–100)
MONOCYTES ABSOLUTE: 1 K/UL (ref 0.2–0.8)
MONOCYTES RELATIVE PERCENT: 12.1 %
NEUTROPHILS ABSOLUTE: 5.3 K/UL (ref 1.4–6.5)
NEUTROPHILS RELATIVE PERCENT: 64.3 %
PDW BLD-RTO: 16.4 % (ref 11.5–14.5)
PERFORMED ON: ABNORMAL
PERFORMED ON: ABNORMAL
PLATELET # BLD: 349 K/UL (ref 130–400)
POTASSIUM REFLEX MAGNESIUM: 4.1 MEQ/L (ref 3.4–4.9)
RBC # BLD: 4.62 M/UL (ref 4.2–5.4)
SODIUM BLD-SCNC: 138 MEQ/L (ref 135–144)
VANCOMYCIN TROUGH: 6 UG/ML (ref 10–20)
WBC # BLD: 8.2 K/UL (ref 4.8–10.8)

## 2020-08-04 PROCEDURE — 85025 COMPLETE CBC W/AUTO DIFF WBC: CPT

## 2020-08-04 PROCEDURE — 80202 ASSAY OF VANCOMYCIN: CPT

## 2020-08-04 PROCEDURE — 83036 HEMOGLOBIN GLYCOSYLATED A1C: CPT

## 2020-08-04 PROCEDURE — 36415 COLL VENOUS BLD VENIPUNCTURE: CPT

## 2020-08-04 PROCEDURE — 6370000000 HC RX 637 (ALT 250 FOR IP): Performed by: INTERNAL MEDICINE

## 2020-08-04 PROCEDURE — 6360000002 HC RX W HCPCS: Performed by: HOSPITALIST

## 2020-08-04 PROCEDURE — 80048 BASIC METABOLIC PNL TOTAL CA: CPT

## 2020-08-04 PROCEDURE — 2580000003 HC RX 258: Performed by: HOSPITALIST

## 2020-08-04 RX ORDER — DOXYCYCLINE HYCLATE 100 MG
100 TABLET ORAL 2 TIMES DAILY
Qty: 20 TABLET | Refills: 0 | Status: SHIPPED | OUTPATIENT
Start: 2020-08-04 | End: 2020-08-14

## 2020-08-04 RX ADMIN — ATORVASTATIN CALCIUM 20 MG: 20 TABLET, FILM COATED ORAL at 08:03

## 2020-08-04 RX ADMIN — Medication 10 ML: at 08:03

## 2020-08-04 RX ADMIN — MEROPENEM 1 G: 1 INJECTION, POWDER, FOR SOLUTION INTRAVENOUS at 05:23

## 2020-08-04 RX ADMIN — LISINOPRIL 20 MG: 20 TABLET ORAL at 08:03

## 2020-08-04 RX ADMIN — ENOXAPARIN SODIUM 40 MG: 40 INJECTION SUBCUTANEOUS at 08:03

## 2020-08-04 ASSESSMENT — PAIN SCALES - GENERAL: PAINLEVEL_OUTOF10: 0

## 2020-08-04 NOTE — PROGRESS NOTES
PODIATRIC MEDICINE AND SURGERY  PROGRESS NOTE      Follow up of: Left foot ulceration  Staff Doctor: Dr. Ahmet Londono:  56 y.o. patient with PMH significant for obesity, diabetes, hypertension for who podiatry was consulted for Left foot cellulitis and bullae. Cellulitis most likely from foot wound. Cellulitic margin is improving. Debridement of further macerated skin revealed healthy tissue. Chronic pressure ulceration of the plantar 5th met head with pressure ulceration of the lateral forefoot. PLAN AND RECOMMENDATIONS[de-identified]  Patient's case to be discussed with staff, Dr. Eddie Jordan, who will provide final recommendations going forward  Wound care: Wound dressed with maxorb, DSD, and janusz, Hyperkeratotic tissue of the plantar aspect of the 5th met head was debrided at the level of the dermis with tissue nippers  WB heel in forefoot offloading shoe as tolerated  Forefoot offloading shoe fitted with lateral post and bunion pad to offload pressure areas  Elevate  at or above heart level while resting  Debridement: Excisional debridement fluid filled bullae at the level of the dermis was removed with tissue knippers and pickups without incident    X-rays of the left foot ordered 8/2/2020  Mri of the left foot ordered 8/3/2020   Wound culture 8/3/2020  Antibiotic coverage per ID  Pain management per primary team   Podiatry ok for planned discharge   Patient will need follow up with Dr. Eddie Jordan within 7-10 days of discharge      Interval HPI: This 56y.o.  year old patient was seen today for left foot ulcer. Patient states that she is feeling better and has noticed resolved pain from cellulitis. Patient denies nausea, vomiting, diarrhea, fevers, chills, chest pain, shortness of breath, head ache, or calf pain. No other pedal complaints. No current facility-administered medications on file prior to encounter.       Current Outpatient Medications on File Prior to Encounter   Medication Sig Dispense Refill  mupirocin (BACTROBAN) 2 % ointment Apply 22 g topically 3 times daily Apply topically 3 times daily.       metFORMIN (GLUCOPHAGE) 1000 MG tablet Take 2,000 mg by mouth 2 times daily (with meals)      glimepiride (AMARYL) 4 MG tablet Take 4 mg by mouth every morning (before breakfast)      Insulin Glargine (BASAGLAR KWIKPEN SC) Inject 12 Units into the skin nightly       atorvastatin (LIPITOR) 20 MG tablet Take 20 mg by mouth daily      LISINOPRIL PO Take 20 mg by mouth daily            LABS:   Lab Results   Component Value Date     08/04/2020    K 4.1 08/04/2020    CL 99 08/04/2020    CO2 25 08/04/2020    BUN 14 08/04/2020    CREATININE 0.72 08/04/2020    GLUCOSE 226 08/04/2020    GLUCOSE 179 09/27/2019    CALCIUM 9.3 08/04/2020     Lab Results   Component Value Date    WBC 8.2 08/04/2020    HGB 11.9 (L) 08/04/2020    HCT 36.7 (L) 08/04/2020    MCV 79.4 (L) 08/04/2020     08/04/2020     Lab Results   Component Value Date    LABALBU 4.1 08/02/2020     No results found for: SEDRATE  No results found for: CRP  Lab Results   Component Value Date    LABA1C 9.3 (H) 08/04/2020           OBJECTIVE:  /63   Pulse 55   Temp 98.1 °F (36.7 °C) (Oral)   Resp 18   Ht 6' 1\" (1.854 m)   Wt 220 lb (99.8 kg)   SpO2 97%   BMI 29.03 kg/m²   Patient is alert and oriented to person, place, and time    Vascular:  Palpable Dorsalis Pedis and Palpable Posterior Tibial Pulses B/L   Capillary Fill time < 3 seconds to B/L digits  Skin temperature warm to cool tibial tuberosity to the digits B/L  Hair growth present to digits  mild edema  No varicosities      Neurological:   Sensation to light touch impaired B/L  Protective sensation via monofilament testing impaired B/L     Musculoskeletal/Orthopaedic:   Structural Deformities: None noted  5/5 muscle strength Dorsiflexion, Plantarflexion, Inversion, Eversion B/L  Mild tenderness on palpation of lateral forefoot ulceration      Dermatological:   Skin appears well hydrated and supple with good temperature, texture, turgor  Plantar 5th met head hyperkeratotic lesions noted  Nails 1-5 B/L appear WNL  Interspaces 1-4 B/L are clear and without debris   open lesions noted of the L foot as below.     Ulceration #1:   Location: Left lateral foot  Measurements: 2.9 cm x 2.0 cm x 0.1 cm  Base: Granular/Healthy  Borders:  extensive  macerated tissue. Exudate: minimal, serosanguinous, purulent drainage   Comments: + periwound erythema and edema extending beyond wound borders constistent with cellulitis with no evidence of ascending lymphangitis. Wound tracks to the plantar wound ~3 cm and doesn't not probe to bone or tendon     Ulceration #2  Location: Plantar 5th met head  Measurements: 0.2 cm x 0.2 cm x 0.2 cm  Base: Granular/Healthy  Borders:  Hyperkeratotic. Exudate: minimal, serosanguinous, purulent. drainage   Comments: No periwound erythema and edema extending beyond wound borders. with no evidence of ascending lymphangitis. Wound tracks to the lateral wound ~3 cm and doesn't not probe to bone or tendon        MICROBIOLOGY:   8/4/2020 10:17 AM - Althea Thurston Incoming Lab Results From [facility]     Specimen Information:  Foot, Left          Component  Collected  Lab    Gram Stain Result  08/03/2020  3:56 PM  [address] [facility]    No WBC's   Rare Gram positive cocci        IMAGING:   X-ray (8/2/2020)  NO RADIOGRAPHIC EVIDENCE OF OSTEOMYELITIS. DEGENERATIVE CHANGE IS NOTED LEFT HEEL.      MRI 8/3/2020  Soft tissue edema/phlegmon/cellulitis without loculated fluid collection.         No evidence for osteomyelitis.           Abel Ponce DPM, PGY-1  Podiatric Surgery Resident  Podiatry On Call Pager: [phone]  08/04/20  1:50 PM

## 2020-08-04 NOTE — DISCHARGE SUMMARY
Hospital Medicine Discharge Summary    Sheela Ponce  :  1960  MRN:  [id number]    Admit date:  2020  Discharge date:  2020    Admitting Physician: James Fitch MD  Primary Care Physician:  DO José Miguel Poe Toña Burnham is a 56 y.o. female that was admitted and treated at [facility] for the following medical issues: Active Problems:    Cellulitis of foot    Open wound of left foot    DM type 2 (diabetes mellitus, type 2) (HCC)    Hypertension    YESENIA (acute kidney injury) ([facility]  Resolved Problems:    * No resolved hospital problems. *      Discharge Diagnoses: Active Problems:    Cellulitis of foot    Open wound of left foot    DM type 2 (diabetes mellitus, type 2) (HCC)    Hypertension    YESENIA (acute kidney injury) ([facility]  Resolved Problems:    * No resolved hospital problems. *    Chief Complaint   Patient presents with    Foot Pain     left       Hospital Course:   Sheela Ponce is a 56 y.o. female who was admitted to the hospital with left foot cellulitis. MRI confirms no osteomyelitis or fluid collection. Seen by podiatry for wound care, to follow-up outpatient. Discharged on oral antibiotics in coordination with infectious disease. Pt was discharge in a stable condition. BP (!) 167/76   Pulse 60   Temp 98.2 °F (36.8 °C) (Oral)   Resp 16   Ht 5' 6\" (1.676 m)   Wt 266 lb (120.7 kg)   SpO2 96%   BMI 42.93 kg/m²     Patient was seen by the following consultants while admitted to [facility]:   Consults:  IP CONSULT TO INFECTIOUS DISEASES  PHARMACY TO DOSE VANCOMYCIN  IP CONSULT TO PODIATRY    Significant Diagnostic Studies:    Xr Foot Left (min 3 Views)    Result Date: 2020  EXAMINATION: XR FOOT LEFT (MIN 3 VIEWS) CLINICAL HISTORY: ULCER RIGHT FOOT COMPARISONS: None available. FINDINGS: Mild diffuse osteopenia. No fracture, dislocation, bone lesion. No radiopaque foreign body.  Spurring plantar and posterior surfaces calcaneus. NO RADIOGRAPHIC EVIDENCE OF OSTEOMYELITIS. DEGENERATIVE CHANGE IS NOTED LEFT HEEL. Mri Foot Left W Wo Contrast    Result Date: 8/3/2020  HISTORY:  Osteomyelitis left foot  L03.119 Cellulitis of foot ICD10 blister lateral distal foot with redness going up the leg. TECHNIQUE: Routine MRI of the left foot with and without contrast Given 20 mL of intravenous ProHance. COMPARISON: Radiographs 8/2/2020 RESULT: Bone Marrow: Bone marrow signal intensity is within normal limits without evidence of fracture, osteomyelitis or marrow-replacing lesion. Subcutaneous Tissues: Focal subcutaneous edema laterally/dorsally with associated skin irregularity. There also is some abnormal edema-like signal along the plantar aspect of the fifth MTP joint, likely phlegmon. No loculated collection. . Joints: Subchondral degenerative cystic change involving the navicular to medial cuneiform joint. No significant joint effusion. Tendons: Visualized flexor and and extensor tendons are within normal limits. Plantar Plates: Plantar plates are intact-appearing. Plantar Aponeurosis: Visualized portions of the plantar aponeurosis in within normal limits. Muscles: Muscle atrophy consistent with diabetes. Other: No other significant abnormality     Soft tissue edema/phlegmon/cellulitis without loculated fluid collection. No evidence for osteomyelitis.       Discharge Medications:       Ken Antunez   Home Medication Instructions S:[id number]    Printed on:08/04/20 1055   Medication Information                      atorvastatin (LIPITOR) 20 MG tablet  Take 20 mg by mouth daily             doxycycline hyclate (VIBRA-TABS) 100 MG tablet  Take 1 tablet by mouth 2 times daily for 10 days             glimepiride (AMARYL) 4 MG tablet  Take 4 mg by mouth every morning (before breakfast)             Insulin Glargine (BASAGLAR KWIKPEN SC)  Inject 12 Units into the skin nightly LISINOPRIL PO  Take 20 mg by mouth daily              metFORMIN (GLUCOPHAGE) 1000 MG tablet  Take 2,000 mg by mouth 2 times daily (with meals)             mupirocin (BACTROBAN) 2 % ointment  Apply 22 g topically 3 times daily Apply topically 3 times daily. Disposition:   If discharged to Home, Any [facility] needs that were indicated and/or required as been addressed and set up by Social Work. Condition at discharge: Pt was medically stable at the time of discharge. Activity: activity as tolerated    Total time taken for discharging this patient: 40 minutes. Greater than 70% of time was spent focused exclusively on this patient. Time was taken to review chart, discuss plans with consultants, reconciling medications, discussing plan answering questions with patient.      Signed:  Eleazar Melgoza

## 2020-08-04 NOTE — PROGRESS NOTES
Dc order in by Dr. Carolyn Gregory. Podiatry and ID signed off. Pt going home on PO antibiotics. Dressing change instructions given by Podiatry and patient supplied with all items needed for dressing changes until she follows up next week. Pt set up with a [facility] PCP. IV removed. Reviewed dc instructions with patient including scripts and f/u appointments. Pt dc home with .  Electronically signed by Sloane Carrasco RN on 8/4/2020 at 1:51 PM

## 2020-08-04 NOTE — PROGRESS NOTES
Case discussed with infectious disease, Dr. Uday Frey. No osteomyelitis or fluid collection on MRI. Okay to discharge on oral antibiotics. Allergy to penicillin and sulfa confirmed. Patient seen with podiatry at bedside.

## 2020-08-05 LAB
ANAEROBIC CULTURE: ABNORMAL
ANAEROBIC CULTURE: ABNORMAL
GRAM STAIN RESULT: ABNORMAL
GRAM STAIN RESULT: ABNORMAL
ORGANISM: ABNORMAL
ORGANISM: ABNORMAL
WOUND/ABSCESS: ABNORMAL
WOUND/ABSCESS: ABNORMAL

## 2020-08-11 ENCOUNTER — OFFICE VISIT (OUTPATIENT)
Dept: FAMILY MEDICINE CLINIC | Age: 60
End: 2020-08-11
Payer: COMMERCIAL

## 2020-08-11 ENCOUNTER — HOSPITAL ENCOUNTER (OUTPATIENT)
Dept: WOUND CARE | Age: 60
Discharge: HOME | End: 2020-08-11
Payer: COMMERCIAL

## 2020-08-11 ENCOUNTER — TELEPHONE (OUTPATIENT)
Dept: FAMILY MEDICINE CLINIC | Age: 60
End: 2020-08-11

## 2020-08-11 VITALS
BODY MASS INDEX: 40.18 KG/M2 | HEIGHT: 66 IN | WEIGHT: 250 LBS | OXYGEN SATURATION: 96 % | HEART RATE: 64 BPM | SYSTOLIC BLOOD PRESSURE: 130 MMHG | DIASTOLIC BLOOD PRESSURE: 84 MMHG | TEMPERATURE: 98 F

## 2020-08-11 VITALS
RESPIRATION RATE: 16 BRPM | DIASTOLIC BLOOD PRESSURE: 88 MMHG | SYSTOLIC BLOOD PRESSURE: 179 MMHG | HEART RATE: 67 BPM | BODY MASS INDEX: 40.18 KG/M2 | HEIGHT: 66 IN | WEIGHT: 250 LBS | TEMPERATURE: 96 F

## 2020-08-11 PROBLEM — L97.522 NON-PRESSURE CHRONIC ULCER OF OTHER PART OF LEFT FOOT WITH FAT LAYER EXPOSED (HCC): Chronic | Status: ACTIVE | Noted: 2020-08-11

## 2020-08-11 PROCEDURE — 99204 OFFICE O/P NEW MOD 45 MIN: CPT | Performed by: FAMILY MEDICINE

## 2020-08-11 PROCEDURE — G8417 CALC BMI ABV UP PARAM F/U: HCPCS | Performed by: FAMILY MEDICINE

## 2020-08-11 PROCEDURE — 1036F TOBACCO NON-USER: CPT | Performed by: FAMILY MEDICINE

## 2020-08-11 PROCEDURE — 99213 OFFICE O/P EST LOW 20 MIN: CPT

## 2020-08-11 PROCEDURE — 11042 DBRDMT SUBQ TIS 1ST 20SQCM/<: CPT

## 2020-08-11 PROCEDURE — 3046F HEMOGLOBIN A1C LEVEL >9.0%: CPT | Performed by: FAMILY MEDICINE

## 2020-08-11 PROCEDURE — G8427 DOCREV CUR MEDS BY ELIG CLIN: HCPCS | Performed by: FAMILY MEDICINE

## 2020-08-11 PROCEDURE — 2022F DILAT RTA XM EVC RTNOPTHY: CPT | Performed by: FAMILY MEDICINE

## 2020-08-11 PROCEDURE — 1111F DSCHRG MED/CURRENT MED MERGE: CPT | Performed by: FAMILY MEDICINE

## 2020-08-11 PROCEDURE — 3017F COLORECTAL CA SCREEN DOC REV: CPT | Performed by: FAMILY MEDICINE

## 2020-08-11 RX ORDER — DOXYCYCLINE HYCLATE 100 MG/1
CAPSULE ORAL
COMMUNITY
Start: 2020-07-09 | End: 2020-08-11 | Stop reason: SDUPTHER

## 2020-08-11 RX ORDER — DOXYCYCLINE HYCLATE 100 MG/1
100 CAPSULE ORAL 2 TIMES DAILY
Qty: 20 CAPSULE | Refills: 0 | Status: SHIPPED | OUTPATIENT
Start: 2020-08-11 | End: 2020-08-21

## 2020-08-11 RX ORDER — GLIPIZIDE 5 MG/1
TABLET ORAL
Qty: 60 TABLET | Status: CANCELLED | OUTPATIENT
Start: 2020-08-11

## 2020-08-11 RX ORDER — ATORVASTATIN CALCIUM 20 MG/1
20 TABLET, FILM COATED ORAL DAILY
Qty: 30 TABLET | Status: CANCELLED | OUTPATIENT
Start: 2020-08-11

## 2020-08-11 RX ORDER — LISINOPRIL 20 MG/1
TABLET ORAL
COMMUNITY
Start: 2020-07-13 | End: 2020-08-11 | Stop reason: SDUPTHER

## 2020-08-11 RX ORDER — GLIPIZIDE 5 MG/1
5 TABLET ORAL DAILY
Qty: 60 TABLET | Refills: 3 | Status: ON HOLD | OUTPATIENT
Start: 2020-08-11 | End: 2020-08-30

## 2020-08-11 RX ORDER — DOXYCYCLINE HYCLATE 100 MG
100 TABLET ORAL 2 TIMES DAILY
Qty: 20 TABLET | Refills: 0 | Status: CANCELLED | OUTPATIENT
Start: 2020-08-11 | End: 2020-08-21

## 2020-08-11 RX ORDER — GLIPIZIDE 5 MG/1
TABLET ORAL
COMMUNITY
Start: 2020-07-19

## 2020-08-11 RX ORDER — ALBUTEROL SULFATE 2.5 MG/3ML
2.5 SOLUTION RESPIRATORY (INHALATION) EVERY 6 HOURS PRN
Qty: 120 EACH | Refills: 3 | Status: SHIPPED | OUTPATIENT
Start: 2020-08-11 | End: 2020-11-10

## 2020-08-11 RX ORDER — LISINOPRIL 20 MG/1
TABLET ORAL
Qty: 30 TABLET | Refills: 3 | Status: SHIPPED | OUTPATIENT
Start: 2020-08-11 | End: 2020-12-15 | Stop reason: SDUPTHER

## 2020-08-11 RX ORDER — ATORVASTATIN CALCIUM 40 MG/1
TABLET, FILM COATED ORAL
COMMUNITY
Start: 2020-07-13 | End: 2020-08-11 | Stop reason: ALTCHOICE

## 2020-08-11 RX ORDER — INSULIN GLARGINE 100 [IU]/ML
14 INJECTION, SOLUTION SUBCUTANEOUS NIGHTLY
Qty: 5 PEN | Refills: 3 | Status: ON HOLD | OUTPATIENT
Start: 2020-08-11 | End: 2020-08-30

## 2020-08-11 ASSESSMENT — PAIN SCALES - GENERAL: PAINLEVEL_OUTOF10: 0

## 2020-08-11 ASSESSMENT — PATIENT HEALTH QUESTIONNAIRE - PHQ9
SUM OF ALL RESPONSES TO PHQ QUESTIONS 1-9: 2
SUM OF ALL RESPONSES TO PHQ QUESTIONS 1-9: 2
2. FEELING DOWN, DEPRESSED OR HOPELESS: 1
1. LITTLE INTEREST OR PLEASURE IN DOING THINGS: 1
SUM OF ALL RESPONSES TO PHQ9 QUESTIONS 1 & 2: 2

## 2020-08-11 NOTE — TELEPHONE ENCOUNTER
Pharmacy called to let you know that Bud Greene is a non-formulary. Can they switch to Lantus? On the metformin, the directions say 2 tablets twice daily with a qty of 60. Is it suppose to be 1 tablet twice daily?   Please advise

## 2020-08-11 NOTE — PLAN OF CARE
[address]:     [facility] [address] [facility], [address] [facility]  p: [phone] f: [phone]     Ordering Center:     [address] [place] [phone]  [phone]  WOUND CARE Dept: [facility] NUMBER [phone]    Patient Information:      Manoj Bo  [address] [place] [address]   [phone]   : 1960  AGE: 56 y.o. GENDER: female   TODAYS DATE:  2020    Insurance:      PRIMARY INSURANCE:  Plan: [employer] [address]  Coverage: MEDICAL MUTUAL  Effective Date: 2020  [id number] - (Commercial)    SECONDARY INSURANCE:  Plan:   Coverage:   Effective Date:   @[email or web address]@    [unfilled]   [unfilled]     Patient Wound Information:      Problem List Items Addressed This Visit     Non-pressure chronic ulcer of other part of left foot with fat layer exposed ([address] (Chronic)        ICD-10 [facility] Problems     Diagnosis Date Noted    Non-pressure chronic ulcer of other part of left foot with fat layer exposed ([address] [L97.522] 2020    DM type 2 (diabetes mellitus, type 2) ([address] [E11.9]            WOUNDS REQUIRING DRESSING SUPPLIES:     Wound 20 Foot Left;Lateral (Active)   Wound Image   20 1440   Wound Diabetic 20 1440   Dressing Status Clean;Dry; Intact 20 0811   Dressing Changed Changed/New 20 2157   Dressing/Treatment Barrier film;4x4;Roll gauze 20 0811   Wound Cleansed Rinsed/Irrigated with saline 20 1440   Wound Length (cm) 3 cm 20 1051   Wound Width (cm) 1.4 cm 20 1051   Wound Surface Area (cm^2) 4.2 cm^2 20 1051   Post-Procedure Length (cm) 0.6 cm 20 1512   Post-Procedure Width (cm) 1.5 cm 20   Post-Procedure Depth (cm) 0.2 cm 20   Post-Procedure Surface Area (cm^2) 0.9 cm^2 20   Post-Procedure Volume (cm^3) 0.18 cm^3 20 Wound Assessment Fluid filled blister 08/11/20 1440   Drainage Amount Scant 08/11/20 1440   Drainage Description Serosanguinous; Yellow 08/11/20 1440   Odor None 08/11/20 1440   Pema-wound Assessment Ecchymosis; Maceration 08/03/20 1051   Number of days: 9          Supplies Requested :      WOUND #: 1   PRIMARY DRESSING:  Other: FEDE   COVER AND SECURE WITH 4X4 GAUZE AND 2 [address]         FREQUENCY OF DRESSING CHANGES:  Three times per week         ADDITIONAL ITEMS:  [] Gloves Small  [x] Gloves Medium [] Gloves Large [] Gloves XLarge  [] Tape 1\" [x] Tape 2\" [] Tape 3\"  [] Medipore Tape  [x] Saline  [] Skin Prep   [] Adhesive Remover   [] Cotton Tip Applicators   [] Other:    Patient Wound(s) Debrided: [x] Yes   [] No    Debribement Type: subcutaneous tissue    Debridement Date: 08/11/2020    Patient currently being seen by Home Health: [] Yes   [x] No    Duration for needed supplies:  []15  [x]30  []60  []90 Days    Provider Information:      PROVIDER'S NAMEManuel Owens DPM    NPI: [id number]

## 2020-08-11 NOTE — CODE DOCUMENTATION
[address] Physician Billing Sheet. Melinda Burnham  AGE: 56 y.o.    GENDER: female  : 1960  TODAY'S DATE:  2020    ICD-10  [facility] [address] Problems    Diagnosis Date Noted    Non-pressure chronic ulcer of other part of left foot with fat layer exposed ([facility] [L97.522] 2020    DM type 2 (diabetes mellitus, type 2) ([facility] [E11.9]        PHYSICIAN PROCEDURES    CPT CODE  93419 LT      Electronically signed by Kerry Pascual DPM on 2020 at 3:27 PM

## 2020-08-11 NOTE — PROGRESS NOTES
Chief Complaint   Patient presents with    Established New Doctor        HPI: Connor GalindoCharlesBright 56 y.o. female presenting for     Patient is coming here to establish care. Hospital follow-up for diabetic foot ulcer and cellulitis. Patient was recently admitted to [address] [facility] for cellulitis of the foot and diabetic foot ulcer on the lateral fifth MPJ of the left foot. Patient reports that she was on IV antibiotics and then was transferred to oral antibiotics. Patient is been monitored by podiatry. Reports that there is some residual discharge but for the most part has improved. Cellulitis has improved. Patient has an appointment with the wound clinic today to see how her wound is going. Patient denies any pain in the area. Denies any fevers, chills, nausea, vomiting, chest pain, shortness of breath, abdominal pain, change in nature, change in stools. Diabetes Mellitus Type 2: Current symptoms/problems include foot ulcerations and neuropathy. Medication compliance:  compliant all of the time  Diabetic diet compliance:  compliant all of the time,  Weight trend: decreasing  Current exercise: no regular exercise 263 down to 250. Barriers: none and impairment:  visual    Home blood sugar records: 140s - 200s early morning sugars have been 140s 170s   Any episodes of hypoglycemia? 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 Cataract in the right)   reports that she has never smoked. She has never used smokeless tobacco.   Daily Aspirin? No: unsure why. Takes 1000 mg twice daily 5 mg glipizide, at night takes 12 U of basaglar.     Lab Results   Component Value Date    LABA1C 9.3 (H) 08/04/2020     Lab Results   Component Value Date    CREATININE 0.72 08/04/2020     Lab Results   Component Value Date    ALT 28 08/02/2020    AST 24 08/02/2020     No results found for: CHOL, TRIG, HDL, LDLCALC, LDLDIRECT Hypertension  Patient is here for follow-up of elevated blood pressure. She is not exercising and is adherent to a low-salt diet. Blood pressure is not well controlled at home. Cardiac symptoms: none. Patient denies chest pressure/discomfort, dyspnea, fatigue, lower extremity edema, orthopnea, palpitations and paroxysmal nocturnal dyspnea. Cardiovascular risk factors: advanced age (older than 54 for men, 72 for women), diabetes mellitus, dyslipidemia, hypertension, obesity (BMI >= 30 kg/m2), sedentary lifestyle and smoking/ tobacco exposure. Quit smoking in 2006 Use of agents associated with hypertension: none. History of target organ damage: none. Current Outpatient Medications   Medication Sig Dispense Refill    glipiZIDE (GLUCOTROL) 5 MG tablet TAKE 1 TABLET BY MOUTH ONCE DAILY BEFORE A MEAL      lisinopril (PRINIVIL;ZESTRIL) 20 MG tablet TAKE 1 TABLET BY MOUTH ONCE DAILY 30 tablet 3    metFORMIN (GLUCOPHAGE) 1000 MG tablet Take 2 tablets by mouth 2 times daily (with meals) 60 tablet 3    insulin glargine (BASAGLAR KWIKPEN) 100 UNIT/ML injection pen Inject 14 Units into the skin nightly 5 pen 3    glipiZIDE (GLUCOTROL) 5 MG tablet Take 1 tablet by mouth daily With meals 60 tablet 3    albuterol (PROVENTIL) (2.5 MG/3ML) 0.083% nebulizer solution Take 3 mLs by nebulization every 6 hours as needed for Wheezing or Shortness of Breath 120 each 3    doxycycline hyclate (VIBRA-TABS) 100 MG tablet Take 1 tablet by mouth 2 times daily for 10 days 20 tablet 0    mupirocin (BACTROBAN) 2 % ointment Apply 22 g topically 3 times daily Apply topically 3 times daily.  doxycycline hyclate (VIBRAMYCIN) 100 MG capsule Take 1 capsule by mouth 2 times daily for 10 days 20 capsule 0     No current facility-administered medications for this visit. ROS  CONSTITUTIONAL: The patient denies fevers, chills, sweats and body ache.   HEENT: Denies headache, blurry vision, eye pain, tinnitus, vertigo,  sore throat, neck or thyroid masses. RESPIRATORY: Denies cough, sputum, hemoptysis. CARDIAC: Denies chest pain, pressure, palpitations, Denies lower extremity edema. GASTROINTESTINAL: Denies abdominal pain, constipation, diarrhea, bleeding in the stools,   GENITOURINARY: Denies dysuria, hematuria, nocturia or frequency, urinary incontinence. NEUROLOGIC: Denies headaches, dizziness, syncope, weakness  MUSCULOSKELETAL: denies changes in range of motion, joint pain, stiffness. ENDOCRINOLOGY: Denies heat or cold intolerance. HEMATOLOGY: Denies easy bleeding or blood transfusion,anemia  DERMATOLOGY: Denies changes in moles or pigmentation changes. PSYCHIATRY: Denies depression, agitation or anxiety.     Past Medical History:   Diagnosis Date    Depression     DM type 2 (diabetes mellitus, type 2) ([facility])     Hyperlipidemia     Hypertension     SVT (supraventricular tachycardia) ([facility])         Past Surgical History:   Procedure Laterality Date    ABLATION OF DYSRHYTHMIC FOCUS      EYE SURGERY      FOOT SURGERY Right         Family History   Family history unknown: Yes        Social History     Socioeconomic History    Marital status:      Spouse name: Not on file    Number of children: Not on file    Years of education: Not on file    Highest education level: Not on file   Occupational History    Not on file   Social Needs    Financial resource strain: Not on file    Food insecurity     Worry: Not on file     Inability: Not on file    Transportation needs     Medical: Not on file     Non-medical: Not on file   Tobacco Use    Smoking status: Never Smoker    Smokeless tobacco: Never Used   Substance and Sexual Activity    Alcohol use: Yes     Comment: socailly     Drug use: Never    Sexual activity: Not on file   Lifestyle    Physical activity     Days per week: Not on file     Minutes per session: Not on file    Stress: Not on file   Relationships    Social connections     Talks on phone: Not on file     Gets together: Not on file     Attends Cheondoism service: Not on file     Active member of club or organization: Not on file     Attends meetings of clubs or organizations: Not on file     Relationship status: Not on file    Intimate partner violence     Fear of current or ex partner: Not on file     Emotionally abused: Not on file     Physically abused: Not on file     Forced sexual activity: Not on file   Other Topics Concern    Not on file   Social History Narrative    Not on file        /84   Pulse 64   Temp 98 °F (36.7 °C)   Ht 5' 6\" (1.676 m)   Wt 250 lb (113.4 kg)   SpO2 96%   BMI 40.35 kg/m²        Physical Exam:    General appearance - alert, well appearing, and in no distress  Mental Status - alert, oriented to person, place, and time  Eyes - pupils equal and reactive, extraocular eye movements intact   Ears - bilateral TM's and external ear canals normal   Nose - normal and patent, no erythema, discharge or polyps   Sinuses - Normal paranasal sinuses without tenderness   Throat - mucous membranes moist, pharynx normal without lesions   Neck - supple, no significant adenopathy   Thyroid - thyroid is normal in size without nodules or tenderness    Chest - clear to auscultation, no wheezes, rales or rhonchi, symmetric air entry   Heart - normal rate, regular rhythm, normal S1, S2, no murmurs, rubs, clicks or gallops  Abdomen - soft, nontender, nondistended, no masses or organomegaly   Back exam - full range of motion, no tenderness, palpable spasm or pain on motion   Neurological - alert, oriented, normal speech, no focal findings or movement disorder noted   Musculoskeletal - no joint tenderness, deformity or swelling   Extremities - peripheral pulses normal, no pedal edema, no clubbing or cyanosis   Skin - normal coloration and turgor, no rashes, no suspicious skin lesions noted      Labs   No results found for: TSHREFLEX  No results found for: TSH    Lab Results   Component Value Date     08/04/2020    K 4.1 08/04/2020    CL 99 08/04/2020    CO2 25 08/04/2020    BUN 14 08/04/2020    CREATININE 0.72 08/04/2020    GLUCOSE 226 (H) 08/04/2020    CALCIUM 9.3 08/04/2020    PROT 7.5 08/02/2020    LABALBU 4.1 08/02/2020    BILITOT 0.3 08/02/2020    ALKPHOS 88 08/02/2020    AST 24 08/02/2020    ALT 28 08/02/2020    LABGLOM >60.0 08/04/2020    GFRAA >60.0 08/04/2020    GLOB 3.4 08/02/2020       Lab Results   Component Value Date    WBC 8.2 08/04/2020    HGB 11.9 (L) 08/04/2020    HCT 36.7 (L) 08/04/2020    MCV 79.4 (L) 08/04/2020     08/04/2020     Lab Results   Component Value Date    LABA1C 9.3 (H) 08/04/2020     No results found for: EAG        A/P: Brenda Burnham 56 y.o. female presenting for     1. Shortness of breath    - albuterol (PROVENTIL) (2.5 MG/3ML) 0.083% nebulizer solution; Take 3 mLs by nebulization every 6 hours as needed for Wheezing or Shortness of Breath  Dispense: 120 each; Refill: 3    2. Non-pressure chronic ulcer of other part of left foot with fat layer exposed ([address]  Follow-up with podiatry. 3. Type 2 diabetes mellitus with foot ulcer, with long-term current use of insulin ([address]  Recommend patient take a baby aspirin daily. Patient declines taking a statin due to it causing sloughing of her skin. Patient will likely need to have change in the regimen of her diabetes medication.  - glipiZIDE (GLUCOTROL) 5 MG tablet; TAKE 1 TABLET BY MOUTH ONCE DAILY BEFORE A MEAL  - lisinopril (PRINIVIL;ZESTRIL) 20 MG tablet; TAKE 1 TABLET BY MOUTH ONCE DAILY  Dispense: 30 tablet; Refill: 3  - metFORMIN (GLUCOPHAGE) 1000 MG tablet; Take 2 tablets by mouth 2 times daily (with meals)  Dispense: 60 tablet; Refill: 3  - insulin glargine (BASAGLAR KWIKPEN) 100 UNIT/ML injection pen; Inject 14 Units into the skin nightly  Dispense: 5 pen; Refill: 3  - glipiZIDE (GLUCOTROL) 5 MG tablet; Take 1 tablet by mouth daily With meals  Dispense: 60 tablet; Refill: 3    4. Hypertension, unspecified type    - lisinopril (PRINIVIL;ZESTRIL) 20 MG tablet; TAKE 1 TABLET BY MOUTH ONCE DAILY  Dispense: 30 tablet; Refill: 3          Please note, this report has been partially produced using speech recognition software  and may cause  and /or contain errors related to that system including grammar, punctuation and spelling as well as words and phrases that may seem inappropriate. If there are questions or concerns please feel free to contact me to clarify.

## 2020-08-11 NOTE — PROGRESS NOTES
Norma Schwartz 35                                                   Progress Note and Procedure Note      Manoj Bo  MEDICAL RECORD NUMBER:  [id number]  AGE: 56 y.o. GENDER: female  : 1960  EPISODE DATE:  2020    Subjective:     Chief Complaint   Patient presents with    Wound Check         HISTORY of PRESENT ILLNESS HPI     Manoj Bo is a 56 y.o. female who presents today for wound/ulcer evaluation. History of Wound Context: Patient seen for follow up from an inpatient consult for Jordan grade II diabetic ulcer on the lateral 5 th MPJ of the left foot. The cellulitis has resolved. The patient is doing her dressing changes and taking her antibiotics. Wound/Ulcer Pain Timing/Severity: none  Quality of pain: numbness  Severity:  0 / 10   Modifying Factors: None  Associated Signs/Symptoms: numbness    Ulcer Identification:  Ulcer Type: diabetic and neuropathic  Contributing Factors: diabetes and shear force    Wound: N/A        PAST MEDICAL HISTORY        Diagnosis Date    Depression     DM type 2 [facility]diabetes mellitus[facility] type 2) [facility]     Hyperlipidemia     Hypertension     SVT (supraventricular tachycardia) ([facility] [address]        PAST SURGICAL HISTORY    Past Surgical History:   Procedure Laterality Date    ABLATION OF DYSRHYTHMIC FOCUS      EYE SURGERY      FOOT SURGERY Right        FAMILY HISTORY    Family History   Family history unknown: Yes       SOCIAL HISTORY    Social History     Tobacco Use    Smoking status: Never Smoker    Smokeless tobacco: Never Used   Substance Use Topics    Alcohol use: Yes     Comment: socailly     Drug use: Never       ALLERGIES    Allergies   Allergen Reactions    Cat Hair Extract      itching, ankle and hands    Penicillins     Statins Other (See Comments)     Skin sloughing    Sulfa Antibiotics     Trimethoprim Itching and Other (See Comments)     Other reaction(s):  Other: See Comments  Burning and redness in eye(eye drop)  Burning and redness in eye(eye drop)         MEDICATIONS    Current Outpatient Medications on File Prior to Encounter   Medication Sig Dispense Refill    doxycycline hyclate (VIBRA-TABS) 100 MG tablet Take 1 tablet by mouth 2 times daily for 10 days 20 tablet 0    mupirocin (BACTROBAN) 2 % ointment Apply 22 g topically 3 times daily Apply topically 3 times daily. No current facility-administered medications on file prior to encounter. REVIEW OF SYSTEMS    Pertinent items are noted in HPI. Objective:      BP (!) 179/88   Pulse 67   Temp 96 °F (35.6 °C) (Temporal)   Resp 16   Ht 5' 6\" (1.676 m)   Wt 250 lb (113.4 kg)   BMI 40.35 kg/m²     Wt Readings from Last 3 Encounters:   08/11/20 250 lb (113.4 kg)   08/11/20 250 lb (113.4 kg)   08/02/20 266 lb (120.7 kg)       PHYSICAL EXAM    Constitutional:   Well nourished and well developed. Appears neat and clean. Patient is alert, oriented x3, and in no apparent distress. Respiratory:  Respiratory effort is easy and symmetric bilaterally. Rate is normal at rest and on room air. Vascular:  Pedal Pulses is palpable and audible with doppler. Capillary refill is <3 sec to digits bilateral.  Extremities negativefor  pitting edema. Neurological:   Sensation is absent to lower extremities. Dermatological:  Wound description noted in wound assessment. The wound(s) are improved since seen in the hospital last week. Cellulitis is resolved. Healthy granular ulcer noted beneath bulla formation on lateral 5th MPJ. No signs of infection noted. Psychiatric:  Judgement and insight intact. Short and long term memory intact. No evidence of depression, anxiety, or agitation. Patient is calm, cooperative, and communicative. Appropriate interactions and affect.         Assessment:      Problem List Items Addressed This Visit     Non-pressure chronic ulcer of other part of left foot with fat layer exposed ([address] (Chronic)           Procedure Note  Indications:  Based on my examination of this patient's wound(s)/ulcer(s) today, debridement is required to promote healing and evaluate the wound base. Performed by: Adel Meckel, DPM    Consent obtained:  Yes    Time out taken:  Yes    Pain Control:         Debridement:Excisional Debridement    Using tissue nippers the wound(s)/ulcer(s) was/were sharply debrided down through and including the removal of epidermis, dermis and subcutaneous tissue. Devitalized Tissue Debrided:  slough and callus    Pre Debridement Measurements:  Are located in the [facility]  Documentation Flow Sheet    Wound/Ulcer #: 1    Post Debridement Measurements:  Wound/Ulcer Descriptions are Pre Debridement except measurements:    Wound 08/02/20 Foot Left;Lateral (Active)   Wound Image   08/11/20 1440   Wound Diabetic 08/11/20 1440   Dressing Status Clean;Dry; Intact 08/04/20 0811   Dressing Changed Changed/New 08/03/20 2157   Dressing/Treatment Barrier film;4x4;Roll gauze 08/04/20 0811   Wound Cleansed Rinsed/Irrigated with saline 08/11/20 1440   Wound Length (cm) 3 cm 08/03/20 1051   Wound Width (cm) 1.4 cm 08/03/20 1051   Wound Surface Area (cm^2) 4.2 cm^2 08/03/20 1051   Post-Procedure Length (cm) 0.6 cm 08/11/20 1512   Post-Procedure Width (cm) 1.5 cm 08/11/20 1512   Post-Procedure Depth (cm) 0.2 cm 08/11/20 1512   Post-Procedure Surface Area (cm^2) 0.9 cm^2 08/11/20 1512   Post-Procedure Volume (cm^3) 0.18 cm^3 08/11/20 1512   Wound Assessment Fluid filled blister 08/11/20 1440   Drainage Amount Scant 08/11/20 1440   Drainage Description Serosanguinous; Yellow 08/11/20 1440   Odor None 08/11/20 1440   Pema-wound Assessment Ecchymosis; Maceration 08/03/20 1051   Number of days: 9            Percent of Wound/Ulcer Debrided: 100%    Total Surface Area Debrided:  0.9 sq cm     Diabetic/Pressure/Non Pressure Ulcers:  Ulcer: Diabetic ulcer, fat layer exposed      Bleeding:  Minimal    Hemostasis Achieved:  by pressure    Procedural Pain:  0  / 10     Post Procedural Pain:  0 / 10     Response to treatment:  Well tolerated by patient. Plan:   Patient examined and debrided. Off loaded with forefoot off loading shoe  Cass and DSD  Rx of Doxyclycline sent to pharmacy for her to take on her vacation in [address] [place] [address] in case of infection. Follow up in 3 weeks        Treatment Note please see attached Discharge Instructions    Written patient dismissal instructions given to patient and signed by patient or POA. Discharge [address] and [facility]   Physician Orders and Discharge Instructions  [address]  Telephone: [phone]      [phone]      NAME:  Noel Johnson  YOB: 1960  MEDICAL RECORD NUMBER:  [id number]    Home Care/Facility:   NONE    Wound Location:   LEFT FOOT  Dressing orders: 1. Cleanse wound(s) with normal saline. 2. Apply dry CASS  Or equivalent to wound bed. 3. Moisten CASS with a few drops of normal saline. 4. Cover with 4x4's and wrap with gauze (janusz or kerlix)  5. Change  Every other day or Monday, Wednesday, and Friday    Compression:  NONE    Offloading Device:  PATIENT TO WEAR A FOREFOOT OFFLOADING SHOE WITH FELTED FOAM INSERTS, PLACE A PILLOW UNDER LEFT LEG WHILE SLEEPING. Other Instructions: DRESSINGS ORDERED TODAY - THEY WILL BE DELIVERED TO YOUR HOUSE; Keep all dressings clean, dry and intact. Keep pressure off the wound(s) at all times. Follow up visit   3 Weeks  September 1, 2020 AT     Please give 24 hour notice if unable to keep appointment. [phone]    If you experience any of the following, please call the Wound Care Service at  [phone] or go to the nearest emergency room.    *Increase in pain *Temperature over 101 *Increase in drainage from your wound or a foul odor  *Uncontrolled swelling *Need for compression bandage changes due to slippage, breakthrough drainage       PLEASE NOTE: IF YOU ARE UNABLE TO OBTAIN WOUND SUPPLIES, CONTINUE TO USE THE SUPPLIES YOU HAVE AVAILABLE UNTIL YOU ARE ABLE TO REACH US.  IT IS MOST IMPORTANT TO KEEP THE WOUND COVERED AT ALL TIMES  Electronically signed by Winsome Lemus DPM on 8/11/2020 at 3:17 PM                      Electronically signed by Winsome Lemus DPM on 8/11/2020 at 3:19 PM

## 2020-08-30 ENCOUNTER — APPOINTMENT (OUTPATIENT)
Dept: ULTRASOUND IMAGING | Age: 60
DRG: 629 | End: 2020-08-30
Payer: COMMERCIAL

## 2020-08-30 ENCOUNTER — HOSPITAL ENCOUNTER (INPATIENT)
Age: 60
LOS: 5 days | Discharge: HOME HEALTH | DRG: 629 | End: 2020-09-04
Attending: INTERNAL MEDICINE | Admitting: INTERNAL MEDICINE
Payer: COMMERCIAL

## 2020-08-30 ENCOUNTER — APPOINTMENT (OUTPATIENT)
Dept: GENERAL RADIOLOGY | Age: 60
DRG: 629 | End: 2020-08-30
Payer: COMMERCIAL

## 2020-08-30 PROBLEM — L03.90 CELLULITIS: Status: ACTIVE | Noted: 2020-08-30

## 2020-08-30 LAB
ALBUMIN SERPL-MCNC: 3.7 G/DL (ref 3.5–4.6)
ALP BLD-CCNC: 82 U/L (ref 40–130)
ALT SERPL-CCNC: 23 U/L (ref 0–33)
ANION GAP SERPL CALCULATED.3IONS-SCNC: 13 MEQ/L (ref 9–15)
AST SERPL-CCNC: 18 U/L (ref 0–35)
BASOPHILS ABSOLUTE: 0.1 K/UL (ref 0–0.2)
BASOPHILS RELATIVE PERCENT: 0.6 %
BILIRUB SERPL-MCNC: 0.4 MG/DL (ref 0.2–0.7)
BUN BLDV-MCNC: 16 MG/DL (ref 8–23)
C-REACTIVE PROTEIN, HIGH SENSITIVITY: 52.8 MG/L (ref 0–5)
CALCIUM SERPL-MCNC: 9 MG/DL (ref 8.5–9.9)
CHLORIDE BLD-SCNC: 98 MEQ/L (ref 95–107)
CO2: 24 MEQ/L (ref 20–31)
CREAT SERPL-MCNC: 0.74 MG/DL (ref 0.5–0.9)
EOSINOPHILS ABSOLUTE: 0.1 K/UL (ref 0–0.7)
EOSINOPHILS RELATIVE PERCENT: 0.4 %
GFR AFRICAN AMERICAN: >60
GFR NON-AFRICAN AMERICAN: >60
GLOBULIN: 3.4 G/DL (ref 2.3–3.5)
GLUCOSE BLD-MCNC: 219 MG/DL (ref 60–115)
GLUCOSE BLD-MCNC: 268 MG/DL (ref 70–99)
GLUCOSE BLD-MCNC: 359 MG/DL (ref 60–115)
HCT VFR BLD CALC: 34.8 % (ref 37–47)
HEMOGLOBIN: 11.5 G/DL (ref 12–16)
LACTIC ACID: 2.6 MMOL/L (ref 0.5–2.2)
LYMPHOCYTES ABSOLUTE: 1.2 K/UL (ref 1–4.8)
LYMPHOCYTES RELATIVE PERCENT: 5.9 %
MCH RBC QN AUTO: 25.8 PG (ref 27–31.3)
MCHC RBC AUTO-ENTMCNC: 33 % (ref 33–37)
MCV RBC AUTO: 78.1 FL (ref 82–100)
MONOCYTES ABSOLUTE: 1.5 K/UL (ref 0.2–0.8)
MONOCYTES RELATIVE PERCENT: 7.2 %
NEUTROPHILS ABSOLUTE: 17.5 K/UL (ref 1.4–6.5)
NEUTROPHILS RELATIVE PERCENT: 85.9 %
PDW BLD-RTO: 15.7 % (ref 11.5–14.5)
PERFORMED ON: ABNORMAL
PERFORMED ON: ABNORMAL
PLATELET # BLD: 415 K/UL (ref 130–400)
POTASSIUM SERPL-SCNC: 4.2 MEQ/L (ref 3.4–4.9)
RBC # BLD: 4.45 M/UL (ref 4.2–5.4)
SEDIMENTATION RATE, ERYTHROCYTE: 31 MM (ref 0–30)
SODIUM BLD-SCNC: 135 MEQ/L (ref 135–144)
TOTAL PROTEIN: 7.1 G/DL (ref 6.3–8)
WBC # BLD: 20.4 K/UL (ref 4.8–10.8)

## 2020-08-30 PROCEDURE — 73630 X-RAY EXAM OF FOOT: CPT

## 2020-08-30 PROCEDURE — 87070 CULTURE OTHR SPECIMN AEROBIC: CPT

## 2020-08-30 PROCEDURE — 6360000002 HC RX W HCPCS: Performed by: PHYSICIAN ASSISTANT

## 2020-08-30 PROCEDURE — 6370000000 HC RX 637 (ALT 250 FOR IP): Performed by: PHYSICIAN ASSISTANT

## 2020-08-30 PROCEDURE — 2580000003 HC RX 258: Performed by: INTERNAL MEDICINE

## 2020-08-30 PROCEDURE — 85025 COMPLETE CBC W/AUTO DIFF WBC: CPT

## 2020-08-30 PROCEDURE — 36415 COLL VENOUS BLD VENIPUNCTURE: CPT

## 2020-08-30 PROCEDURE — 6370000000 HC RX 637 (ALT 250 FOR IP): Performed by: INTERNAL MEDICINE

## 2020-08-30 PROCEDURE — 80053 COMPREHEN METABOLIC PANEL: CPT

## 2020-08-30 PROCEDURE — 87205 SMEAR GRAM STAIN: CPT

## 2020-08-30 PROCEDURE — 87075 CULTR BACTERIA EXCEPT BLOOD: CPT

## 2020-08-30 PROCEDURE — 87077 CULTURE AEROBIC IDENTIFY: CPT

## 2020-08-30 PROCEDURE — 2580000003 HC RX 258: Performed by: PHYSICIAN ASSISTANT

## 2020-08-30 PROCEDURE — 85652 RBC SED RATE AUTOMATED: CPT

## 2020-08-30 PROCEDURE — 93971 EXTREMITY STUDY: CPT

## 2020-08-30 PROCEDURE — 87040 BLOOD CULTURE FOR BACTERIA: CPT

## 2020-08-30 PROCEDURE — 87185 SC STD ENZYME DETCJ PER NZM: CPT

## 2020-08-30 PROCEDURE — 96365 THER/PROPH/DIAG IV INF INIT: CPT

## 2020-08-30 PROCEDURE — 6360000002 HC RX W HCPCS: Performed by: INTERNAL MEDICINE

## 2020-08-30 PROCEDURE — 99284 EMERGENCY DEPT VISIT MOD MDM: CPT

## 2020-08-30 PROCEDURE — 2060000000 HC ICU INTERMEDIATE R&B

## 2020-08-30 PROCEDURE — 83605 ASSAY OF LACTIC ACID: CPT

## 2020-08-30 PROCEDURE — 87081 CULTURE SCREEN ONLY: CPT

## 2020-08-30 PROCEDURE — 3E0T3BZ INTRODUCTION OF ANESTHETIC AGENT INTO PERIPHERAL NERVES AND PLEXI, PERCUTANEOUS APPROACH: ICD-10-PCS | Performed by: ANESTHESIOLOGY

## 2020-08-30 PROCEDURE — 86141 C-REACTIVE PROTEIN HS: CPT

## 2020-08-30 RX ORDER — SODIUM CHLORIDE 0.9 % (FLUSH) 0.9 %
10 SYRINGE (ML) INJECTION PRN
Status: DISCONTINUED | OUTPATIENT
Start: 2020-08-30 | End: 2020-09-04 | Stop reason: HOSPADM

## 2020-08-30 RX ORDER — POLYETHYLENE GLYCOL 3350 17 G/17G
17 POWDER, FOR SOLUTION ORAL DAILY PRN
Status: DISCONTINUED | OUTPATIENT
Start: 2020-08-30 | End: 2020-09-04 | Stop reason: HOSPADM

## 2020-08-30 RX ORDER — INSULIN GLARGINE 100 [IU]/ML
14 INJECTION, SOLUTION SUBCUTANEOUS NIGHTLY
COMMUNITY

## 2020-08-30 RX ORDER — DIPHENHYDRAMINE HYDROCHLORIDE 50 MG/ML
12.5 INJECTION INTRAMUSCULAR; INTRAVENOUS ONCE
Status: COMPLETED | OUTPATIENT
Start: 2020-08-30 | End: 2020-08-30

## 2020-08-30 RX ORDER — OXYCODONE HYDROCHLORIDE AND ACETAMINOPHEN 5; 325 MG/1; MG/1
1 TABLET ORAL EVERY 6 HOURS PRN
Status: DISCONTINUED | OUTPATIENT
Start: 2020-08-30 | End: 2020-09-04 | Stop reason: HOSPADM

## 2020-08-30 RX ORDER — GLIPIZIDE 5 MG/1
5 TABLET ORAL
Status: DISCONTINUED | OUTPATIENT
Start: 2020-08-31 | End: 2020-09-04 | Stop reason: HOSPADM

## 2020-08-30 RX ORDER — HYDROCODONE BITARTRATE AND ACETAMINOPHEN 5; 325 MG/1; MG/1
1 TABLET ORAL ONCE
Status: COMPLETED | OUTPATIENT
Start: 2020-08-30 | End: 2020-08-30

## 2020-08-30 RX ORDER — INSULIN GLARGINE 100 [IU]/ML
14 INJECTION, SOLUTION SUBCUTANEOUS NIGHTLY
Status: DISCONTINUED | OUTPATIENT
Start: 2020-08-30 | End: 2020-09-04 | Stop reason: HOSPADM

## 2020-08-30 RX ORDER — LISINOPRIL 20 MG/1
20 TABLET ORAL DAILY
Status: DISCONTINUED | OUTPATIENT
Start: 2020-08-30 | End: 2020-09-04 | Stop reason: HOSPADM

## 2020-08-30 RX ORDER — ONDANSETRON 2 MG/ML
4 INJECTION INTRAMUSCULAR; INTRAVENOUS EVERY 6 HOURS PRN
Status: DISCONTINUED | OUTPATIENT
Start: 2020-08-30 | End: 2020-09-04 | Stop reason: HOSPADM

## 2020-08-30 RX ORDER — SODIUM CHLORIDE 0.9 % (FLUSH) 0.9 %
10 SYRINGE (ML) INJECTION EVERY 12 HOURS SCHEDULED
Status: DISCONTINUED | OUTPATIENT
Start: 2020-08-30 | End: 2020-09-04 | Stop reason: HOSPADM

## 2020-08-30 RX ORDER — 0.9 % SODIUM CHLORIDE 0.9 %
1000 INTRAVENOUS SOLUTION INTRAVENOUS ONCE
Status: COMPLETED | OUTPATIENT
Start: 2020-08-30 | End: 2020-08-30

## 2020-08-30 RX ORDER — DIPHENHYDRAMINE HYDROCHLORIDE 50 MG/ML
25 INJECTION INTRAMUSCULAR; INTRAVENOUS ONCE
Status: COMPLETED | OUTPATIENT
Start: 2020-08-30 | End: 2020-08-30

## 2020-08-30 RX ORDER — ACETAMINOPHEN 650 MG/1
650 SUPPOSITORY RECTAL EVERY 6 HOURS PRN
Status: DISCONTINUED | OUTPATIENT
Start: 2020-08-30 | End: 2020-09-04 | Stop reason: HOSPADM

## 2020-08-30 RX ORDER — PROMETHAZINE HYDROCHLORIDE 25 MG/1
12.5 TABLET ORAL EVERY 6 HOURS PRN
Status: DISCONTINUED | OUTPATIENT
Start: 2020-08-30 | End: 2020-09-04 | Stop reason: HOSPADM

## 2020-08-30 RX ORDER — ACETAMINOPHEN 325 MG/1
650 TABLET ORAL EVERY 6 HOURS PRN
Status: DISCONTINUED | OUTPATIENT
Start: 2020-08-30 | End: 2020-09-04 | Stop reason: HOSPADM

## 2020-08-30 RX ADMIN — MEROPENEM 1 G: 1 INJECTION, POWDER, FOR SOLUTION INTRAVENOUS at 15:46

## 2020-08-30 RX ADMIN — SODIUM CHLORIDE 1000 ML: 9 INJECTION, SOLUTION INTRAVENOUS at 10:24

## 2020-08-30 RX ADMIN — DIPHENHYDRAMINE HYDROCHLORIDE 12.5 MG: 50 INJECTION, SOLUTION INTRAMUSCULAR; INTRAVENOUS at 23:06

## 2020-08-30 RX ADMIN — MEROPENEM 1 G: 1 INJECTION, POWDER, FOR SOLUTION INTRAVENOUS at 22:21

## 2020-08-30 RX ADMIN — METFORMIN HYDROCHLORIDE 2000 MG: 500 TABLET ORAL at 15:45

## 2020-08-30 RX ADMIN — Medication 10 ML: at 22:24

## 2020-08-30 RX ADMIN — INSULIN GLARGINE 14 UNITS: 100 INJECTION, SOLUTION SUBCUTANEOUS at 22:22

## 2020-08-30 RX ADMIN — LISINOPRIL 20 MG: 20 TABLET ORAL at 15:05

## 2020-08-30 RX ADMIN — ACETAMINOPHEN 650 MG: 325 TABLET, FILM COATED ORAL at 15:05

## 2020-08-30 RX ADMIN — VANCOMYCIN HYDROCHLORIDE 1500 MG: 5 INJECTION, POWDER, LYOPHILIZED, FOR SOLUTION INTRAVENOUS at 23:06

## 2020-08-30 RX ADMIN — DIPHENHYDRAMINE HYDROCHLORIDE 25 MG: 50 INJECTION, SOLUTION INTRAMUSCULAR; INTRAVENOUS at 15:05

## 2020-08-30 RX ADMIN — OXYCODONE HYDROCHLORIDE AND ACETAMINOPHEN 1 TABLET: 5; 325 TABLET ORAL at 20:33

## 2020-08-30 RX ADMIN — VANCOMYCIN HYDROCHLORIDE 1750 MG: 750 INJECTION, POWDER, LYOPHILIZED, FOR SOLUTION INTRAVENOUS at 11:34

## 2020-08-30 RX ADMIN — HYDROCODONE BITARTRATE AND ACETAMINOPHEN 1 TABLET: 5; 325 TABLET ORAL at 10:24

## 2020-08-30 ASSESSMENT — ENCOUNTER SYMPTOMS
ABDOMINAL PAIN: 0
EYE PAIN: 0
COUGH: 0
SHORTNESS OF BREATH: 0
RHINORRHEA: 0
NAUSEA: 0
PHOTOPHOBIA: 0
VOMITING: 0
SORE THROAT: 0
COLOR CHANGE: 1
DIARRHEA: 0
BACK PAIN: 0

## 2020-08-30 ASSESSMENT — PAIN DESCRIPTION - PAIN TYPE
TYPE: ACUTE PAIN
TYPE: ACUTE PAIN

## 2020-08-30 ASSESSMENT — PAIN DESCRIPTION - ORIENTATION
ORIENTATION: LEFT
ORIENTATION: LEFT

## 2020-08-30 ASSESSMENT — PAIN SCALES - GENERAL
PAINLEVEL_OUTOF10: 0
PAINLEVEL_OUTOF10: 0
PAINLEVEL_OUTOF10: 8
PAINLEVEL_OUTOF10: 0
PAINLEVEL_OUTOF10: 8
PAINLEVEL_OUTOF10: 6
PAINLEVEL_OUTOF10: 8
PAINLEVEL_OUTOF10: 0
PAINLEVEL_OUTOF10: 6
PAINLEVEL_OUTOF10: 10

## 2020-08-30 ASSESSMENT — PAIN DESCRIPTION - LOCATION
LOCATION: FOOT
LOCATION: FOOT

## 2020-08-30 NOTE — FLOWSHEET NOTE
Perfect serve to attending to notify that patient is requesting a no carb diet, a prn pain med, a prn benadryl prior to vanco doses, as well as accuchecks. Electronically signed by Alexey Zapata RN on 8/30/2020 at 1:58 PM    Perfect serve to hospitalist regarding prn benadryl dose that patient is requesting with vanco administrations. Pt states that any time she gets vanco she becomes itchy and the benadryl helps.  Electronically signed by Alexey Zapata RN on 8/30/2020 at 7:35 PM

## 2020-08-30 NOTE — ED NOTES
RN to bedside. Pt is resting peacefully. Resps are even and unlabored, skin p/w/d, and no acute distress at this time. Will continue to monitor.      Sobia Tomlin RN  08/30/20 7826

## 2020-08-30 NOTE — H&P
Hospital Medicine  History and Physical    Patient:  Elizabeth Perez  MRN: [id number]    CHIEF COMPLAINT:    Chief Complaint   Patient presents with    Foot Pain     left foot; red, swollen, painful       History Obtained From:  Patient, EMR  Primary Care Physician: Larry Ta MD    HISTORY OF PRESENT ILLNESS:   The patient is a 56 y.o. female who presents with left foot infection. She was recently admitted to the hospital for cellulitis, treated appropriately and was doing well with wound care. However she decided to go on vacation during which time she went parasailing with this open wound, she chose to get dunked into the ocean while parasailing which unfortunately contaminated her healing foot. Subsequently, her infection started to worsen, and therefore was admitted to the hospital for further evaluation and treatment. Duration of symptoms: Days. Timing: Gradually worsening. Context, worsened despite oral antibiotics. Not associated with fever. Past Medical History:      Diagnosis Date    Depression     DM type 2 [facility] type 2) [facility]     Hyperlipidemia     Hypertension     SVT (supraventricular tachycardia) ([facility])        Past Surgical History:      Procedure Laterality Date    ABLATION OF DYSRHYTHMIC FOCUS      EYE SURGERY      FOOT SURGERY Right        Medications Prior to Admission:    Prior to Admission medications    Medication Sig Start Date End Date Taking?  Authorizing Provider   insulin glargine (LANTUS) 100 UNIT/ML injection vial Inject 14 Units into the skin nightly   Yes Historical Provider, MD   glipiZIDE (GLUCOTROL) 5 MG tablet TAKE 1 TABLET BY MOUTH ONCE DAILY BEFORE A MEAL 7/19/20  Yes Historical Provider, MD   lisinopril (PRINIVIL;ZESTRIL) 20 MG tablet TAKE 1 TABLET BY MOUTH ONCE DAILY 8/11/20  Yes Ty Aleman MD   metFORMIN (GLUCOPHAGE) 1000 MG tablet Take 2 tablets by mouth 2 times daily (with meals) 8/11/20  Yes Ty Aleman MD hours.  URINALYSIS:No results for input(s): NITRITE, COLORU, PHUR, LABCAST, WBCUA, RBCUA, MUCUS, TRICHOMONAS, YEAST, BACTERIA, CLARITYU, SPECGRAV, LEUKOCYTESUR, UROBILINOGEN, BILIRUBINUR, BLOODU, GLUCOSEU, AMORPHOUS in the last 72 hours. Invalid input(s): Jailene Borjas  -----------------------------------------------------------------   Us Dup Lower Extremity Left Pramod    Result Date: 8/30/2020  US DUP LOWER EXTREMITY LEFT PRAMOD : 8/30/2020 CLINICAL HISTORY: Left lower extremity pain and swelling and recent 11 hour car ride from [place]. COMPARISON: None available. Grayscale, compression, color and waveform Doppler analysis of the left lower extremity deep venous system was performed with augmentation. FINDINGS: There is no deep venous thrombosis, abnormal masses, focal fluid collections or other findings of concern identified within the left lower extremity. NO LEFT LOWER EXTREMITY DVT IDENTIFIED. Assessment and Plan   1. Foot wound with cellulitis and drainage. Pictures seen with podiatry. Wound now wrapped. IV antibiotics, for surgery on Wednesday. 2. Diabetes: Questionable compliance. Requested diabetic diet be removed while in the hospital.  3. Hypertension: Continue medications, monitor blood pressure  4. DVT ppx  5. Disposition: Dependent on hospital course. Will discharge once medically stable. SW on board for discharge planning.      Patient Active Problem List   Diagnosis Code    Cellulitis of foot L03.119    Open wound of left foot S80.80A    DM type 2 (diabetes mellitus, type 2) ([facility] [address] E11.9    Hypertension I10    YESENIA (acute kidney injury) ([address] N17.9    Non-pressure chronic ulcer of other part of left foot with fat layer exposed ([address] L97.522    Cellulitis L03.90       Phoebe Edwards MD

## 2020-08-30 NOTE — ED PROVIDER NOTES
[address] [facility] ED  eMERGENCY dEPARTMENTeNCOUnter      Pt Name: Sheela Ponce  MRN: [id number]  [place] 1960  Date ofevaluation: 8/30/2020  Provider: Cassie Murphy Dr       Chief Complaint   Patient presents with    Foot Pain     left foot; red, swollen, painful         HISTORY OF PRESENT ILLNESS   (Location/Symptom, Timing/Onset,Context/Setting, Quality, Duration, Modifying Factors, Severity)  Note limiting factors. Sheela Ponce is a 56 y.o. female who presents to the emergency department concern for left foot cellulitis. Patient has had this in the past and it did improve after having IV antibiotics. Patient went on vacation and started doxycycline 4 days ago and progressively has been getting worse. There is associated redness warmth pain. She denies any drainage. She does have a history of a wound to the left lateral aspect of her foot. She denies any numbness at this time. She denies any fevers nausea vomiting. HPI    NursingNotes were reviewed. REVIEW OF SYSTEMS    (2-9 systems for level 4, 10 or more for level 5)     Review of Systems   Constitutional: Negative for chills, diaphoresis, fatigue and fever. HENT: Negative for congestion, rhinorrhea and sore throat. Eyes: Negative for photophobia and pain. Respiratory: Negative for cough and shortness of breath. Cardiovascular: Negative for chest pain and palpitations. Gastrointestinal: Negative for abdominal pain, diarrhea, nausea and vomiting. Genitourinary: Negative for dysuria and flank pain. Musculoskeletal: Negative for back pain. Skin: Positive for color change and wound. Negative for rash. Neurological: Negative for dizziness, light-headedness and headaches. Psychiatric/Behavioral: Negative. All other systems reviewed and are negative. Except as noted above the remainder of the review of systems was reviewed and negative.        PAST MEDICAL HISTORY Past Medical History:   Diagnosis Date    Depression     DM type 2 [facility]diabetes [facility] type 2) ([facility])     Hyperlipidemia     Hypertension     SVT (supraventricular tachycardia) ([facility])          SURGICALHISTORY       Past Surgical History:   Procedure Laterality Date    ABLATION OF DYSRHYTHMIC FOCUS      EYE SURGERY      FOOT SURGERY Right          CURRENT MEDICATIONS       Previous Medications    ALBUTEROL (PROVENTIL) (2.5 MG/3ML) 0.083% NEBULIZER SOLUTION    Take 3 mLs by nebulization every 6 hours as needed for Wheezing or Shortness of Breath    GLIPIZIDE (GLUCOTROL) 5 MG TABLET    TAKE 1 TABLET BY MOUTH ONCE DAILY BEFORE A MEAL    GLIPIZIDE (GLUCOTROL) 5 MG TABLET    Take 1 tablet by mouth daily With meals    INSULIN GLARGINE (BASAGLAR KWIKPEN) 100 UNIT/ML INJECTION PEN    Inject 14 Units into the skin nightly    LISINOPRIL (PRINIVIL;ZESTRIL) 20 MG TABLET    TAKE 1 TABLET BY MOUTH ONCE DAILY    METFORMIN (GLUCOPHAGE) 1000 MG TABLET    Take 2 tablets by mouth 2 times daily (with meals)    MUPIROCIN (BACTROBAN) 2 % OINTMENT    Apply 22 g topically 3 times daily Apply topically 3 times daily. ALLERGIES     Cat hair extract;  Penicillins; Statins; Sulfa antibiotics; and Trimethoprim    FAMILY HISTORY       Family History   Family history unknown: Yes          SOCIAL HISTORY       Social History     Socioeconomic History    Marital status:      Spouse name: None    Number of children: None    Years of education: None    Highest education level: None   Occupational History    None   Social Needs    Financial resource strain: None    Food insecurity     Worry: None     Inability: None    Transportation needs     Medical: None     Non-medical: None   Tobacco Use    Smoking status: Never Smoker    Smokeless tobacco: Never Used   Substance and Sexual Activity    Alcohol use: Yes     Comment: socailly     Drug use: Never    Sexual activity: None   Lifestyle    Physical activity     Days per week: None     Minutes per session: None    Stress: None   Relationships    Social connections     Talks on phone: None     Gets together: None     Attends Tenriism service: None     Active member of club or organization: None     Attends meetings of clubs or organizations: None     Relationship status: None    Intimate partner violence     Fear of current or ex partner: None     Emotionally abused: None     Physically abused: None     Forced sexual activity: None   Other Topics Concern    None   Social History Narrative    None       SCREENINGS      @FLOW([id number])@      PHYSICAL EXAM    (up to 7 for level 4, 8 or more for level 5)     ED Triage Vitals [08/30/20 0949]   BP Temp Temp Source Pulse Resp SpO2 Height Weight   (!) 161/85 99.5 °F (37.5 °C) Oral 84 17 (!) 9 % 5' 6\" (1.676 m) 250 lb (113.4 kg)       Physical Exam  Vitals signs and nursing note reviewed. Constitutional:       General: She is not in acute distress. Appearance: Normal appearance. She is well-developed. She is not diaphoretic. HENT:      Head: Normocephalic and atraumatic. Eyes:      General: Lids are normal.      Conjunctiva/sclera: Conjunctivae normal.   Neck:      Musculoskeletal: Normal range of motion and neck supple. Cardiovascular:      Rate and Rhythm: Normal rate and regular rhythm. Pulses: Normal pulses. Heart sounds: Normal heart sounds. Pulmonary:      Effort: Pulmonary effort is normal.      Breath sounds: Normal breath sounds. Abdominal:      General: Bowel sounds are normal.      Palpations: Abdomen is soft. Tenderness: There is no abdominal tenderness. Musculoskeletal:      Left foot: Tenderness and swelling present. Feet:       Comments: Associated redness and warmth. 2+ pulses. [place]. Lymphadenopathy:      Cervical: No cervical adenopathy. Skin:     General: Skin is warm and dry. Capillary Refill: Capillary refill takes less than 2 seconds. Findings: No rash. Neurological:      Mental Status: She is alert and oriented to person, place, and time. Psychiatric:         Thought Content: Thought content normal.         Judgment: Judgment normal.         DIAGNOSTIC RESULTS     EKG: All EKG's are interpreted by the Emergency Department Physician who either signs or Co-signsthis chart in the absence of a cardiologist.        RADIOLOGY:   [facility] such as CT, Ultrasound and MRI are read by the radiologist. Plain radiographic images are visualized and preliminarily interpreted by the emergency physician with the below findings:        Interpretation per the Radiologist below, if available at the time ofthis note:    XR FOOT LEFT (MIN 3 VIEWS)    (Results Pending)   US DUP LOWER EXTREMITY LEFT PRAMOD    (Results Pending)         ED BEDSIDE ULTRASOUND:   Performed by ED Physician - none    LABS:  Labs Reviewed   COMPREHENSIVE METABOLIC PANEL - Abnormal; Notable for the following components:       Result Value    Glucose 268 (*)     All other components within normal limits   CBC WITH AUTO DIFFERENTIAL - Abnormal; Notable for the following components:    WBC 20.4 (*)     Hemoglobin 11.5 (*)     Hematocrit 34.8 (*)     MCV 78.1 (*)     MCH 25.8 (*)     RDW 15.7 (*)     Platelets 909 (*)     Neutrophils Absolute 17.5 (*)     Monocytes Absolute 1.5 (*)     All other components within normal limits   LACTIC ACID, PLASMA - Abnormal; Notable for the following components:    Lactic Acid 2.6 (*)     All other components within normal limits   SEDIMENTATION RATE - Abnormal; Notable for the following components:    Sed Rate 31 (*)     All other components within normal limits   CULTURE, BLOOD 1   CULTURE, BLOOD 2   HIGH SENSITIVITY CRP       All other labs were within normal range or not returned as of this dictation.     EMERGENCY DEPARTMENT COURSE and DIFFERENTIAL DIAGNOSIS/MDM:   Vitals:    Vitals:    08/30/20 0949   BP: (!) 161/85   Pulse: 84   Resp: 17   Temp: 99.5 °F (37.5 °C)   TempSrc: Oral   SpO2: (!) 9%   Weight: 250 lb (113.4 kg)   Height: 5' 6\" (1.676 m)           MDM    Patient is nontoxic afebrile hemodynamically stable. She is left foot cellulitis she has leukocytosis of 20,000 lactic acid of 2.6 with a wound. Ultrasound of left lower extremity is negative for DVT. She is started on IV vancomycin. Talk to podiatry on-call they will evaluate the patient either in the emergency department or on the floor as a urine surgery at this time. Dr. Michelle Goyal accepted the patient for admission to the hospital.  Patient stable ready for admission. REASSESSMENT          CRITICAL CARE TIME   Total Critical Care time was  minutes, excluding separatelyreportable procedures. There was a high probability ofclinically significant/life threatening deterioration in the patient's condition which required my urgent intervention. CONSULTS:  None    PROCEDURES:  Unless otherwise noted below, none     Procedures    FINAL IMPRESSION      1. Cellulitis of left foot          DISPOSITION/PLAN   DISPOSITION Decision To Admit 08/30/2020 11:04:44 AM      PATIENT REFERREDTO:  No follow-up provider specified.     DISCHARGEMEDICATIONS:  New Prescriptions    No medications on file          (Please note that portions of this note were completed with a voice recognition program.  Efforts were made to edit the dictations but occasionally words are mis-transcribed.)    Richie Hu PA-C (electronically signed)  Attending Emergency Physician         Richie uH PA-C  08/30/20 1139

## 2020-08-30 NOTE — CONSULTS
Denies any nausea, vomiting, fever shills and shortness of breath. .  Patient denies nausea, vomiting, diarrhea, fevers, chills, chest pain, shortness of breath, head ache, or calf pain. No other pedal complaints. Past Medical History:   Diagnosis Date    Depression     DM type 2 (diabetes mellitus, type 2) ([facility])     Hyperlipidemia     Hypertension     SVT (supraventricular tachycardia) ([facility])        Past Surgical History:   Procedure Laterality Date    ABLATION OF DYSRHYTHMIC FOCUS      EYE SURGERY      FOOT SURGERY Right        No current facility-administered medications on file prior to encounter. Current Outpatient Medications on File Prior to Encounter   Medication Sig Dispense Refill    glipiZIDE (GLUCOTROL) 5 MG tablet TAKE 1 TABLET BY MOUTH ONCE DAILY BEFORE A MEAL      lisinopril (PRINIVIL;ZESTRIL) 20 MG tablet TAKE 1 TABLET BY MOUTH ONCE DAILY 30 tablet 3    metFORMIN (GLUCOPHAGE) 1000 MG tablet Take 2 tablets by mouth 2 times daily (with meals) 60 tablet 3    insulin glargine (BASAGLAR KWIKPEN) 100 UNIT/ML injection pen Inject 14 Units into the skin nightly 5 pen 3    glipiZIDE (GLUCOTROL) 5 MG tablet Take 1 tablet by mouth daily With meals 60 tablet 3    albuterol (PROVENTIL) (2.5 MG/3ML) 0.083% nebulizer solution Take 3 mLs by nebulization every 6 hours as needed for Wheezing or Shortness of Breath 120 each 3    mupirocin (BACTROBAN) 2 % ointment Apply 22 g topically 3 times daily Apply topically 3 times daily. Allergies   Allergen Reactions    Cat Hair Extract      itching, ankle and hands    Penicillins     Statins Other (See Comments)     Skin sloughing    Sulfa Antibiotics     Trimethoprim Itching and Other (See Comments)     Other reaction(s):  Other: See Comments  Burning and redness in eye(eye drop)  Burning and redness in eye(eye drop)         Family History   Family history unknown: Yes       Social History     Socioeconomic History    Marital status:      Spouse name: Not on file    Number of children: Not on file    Years of education: Not on file    Highest education level: Not on file   Occupational History    Not on file   Social Needs    Financial resource strain: Not on file    Food insecurity     Worry: Not on file     Inability: Not on file    Transportation needs     Medical: Not on file     Non-medical: Not on file   Tobacco Use    Smoking status: Never Smoker    Smokeless tobacco: Never Used   Substance and Sexual Activity    Alcohol use: Yes     Comment: socailly     Drug use: Never    Sexual activity: Not on file   Lifestyle    Physical activity     Days per week: Not on file     Minutes per session: Not on file    Stress: Not on file   Relationships    Social connections     Talks on phone: Not on file     Gets together: Not on file     Attends Muslim service: Not on file     Active member of club or organization: Not on file     Attends meetings of clubs or organizations: Not on file     Relationship status: Not on file    Intimate partner violence     Fear of current or ex partner: Not on file     Emotionally abused: Not on file     Physically abused: Not on file     Forced sexual activity: Not on file   Other Topics Concern    Not on file   Social History Narrative    Not on file       Review of Systems  CONSTITUTIONAL: No fevers, chills, diaphoresis  HEENT: No epistaxis, tinnitus  EYES: No diplopia, blurry vision. CARDIOVASCULAR: No chest pain, palpitations.  + lower extremity edema  PULM: No dyspnea, tachypnea, wheezing  GI: No nausea, vomiting, constipation, diarrhea  : No dysuria, gross hematuria, or pyuria  NEURO: pedal neuropathy   MSK: left ankle pain  PSY: No concerns regarding depression, anxiety  INTEGUMENTARY: open lesion to left foot     OBJECTIVE:  BP (!) 161/85   Pulse 84   Temp 99.5 °F (37.5 °C) (Oral)   Resp 17   Ht 5' 6\" (1.676 m)   Wt 250 lb (113.4 kg)   SpO2 (!) 9%   BMI 40.35 kg/m² Patient is alert and oriented to person, place, and time    Vascular:   Palpable Dorsalis Pedis and Palpable Posterior Tibial Pulses B/L   Capillary Fill time < 3 seconds to B/L digits  Skin temperature warm to warm tibial tuberosity to the digits B/L  Hair growth sparse to digits  mild edema  + lymphangitis   No varicosities      Neurological:   Sensation to light touch impaired B/L  Protective sensation via monofilament testing impaired B/L     Musculoskeletal/Orthopaedic:   Structural Deformities: None noted  5/5 muscle strength Dorsiflexion, Plantarflexion, Inversion, Eversion B/L  Mild tenderness on palpation of lateral forefoot ulceration      Dermatological:   Skin appears well hydrated and supple with good temperature, texture, turgor  Plantar 5th met head wound noted  Nails 1-5 B/L appear WNL  Interspaces 1-4 B/L are clear and without debris       Ulceration #1:   Location: plantar 5th met head   Measurements: ~1.5 cm x 1 cm x 0.4 cm (to level of periosteum   Base: fibrotic  Borders: hyperkeratotic   Exudate:  purulent drainage   Comments: + periwound erythema and edema extending beyond wound borders constistent with cellulitis. There is evidence of ascending lymphangitis which was marked with surgical pen and dated.  Wound does not probe to bone but to level of periosteum                      LABS:   Lab Results   Component Value Date    WBC 20.4 (H) 08/30/2020    HGB 11.5 (L) 08/30/2020    HCT 34.8 (L) 08/30/2020    MCV 78.1 (L) 08/30/2020     (H) 08/30/2020     Lab Results   Component Value Date     08/30/2020    K 4.2 08/30/2020    K 4.1 08/04/2020    CL 98 08/30/2020    CO2 24 08/30/2020    BUN 16 08/30/2020    CREATININE 0.74 08/30/2020    GLUCOSE 268 08/30/2020    GLUCOSE 179 09/27/2019    CALCIUM 9.0 08/30/2020      Lab Results   Component Value Date    LABALBU 3.7 08/30/2020     Lab Results   Component Value Date    SEDRATE 31 (H) 08/30/2020     No results found for: CRP  Lab Results Component Value Date    LABA1C 9.3 (H) 08/04/2020       MICROBIOLOGY:   Taken in ED     IMAGING:   XR pending. No gas or OM on my read but formal read to follow     MRI 8/3  Impression         Soft tissue edema/phlegmon/cellulitis without loculated fluid collection.         No evidence for osteomyelitis.            Yoni Suresh DPM PGY-3  Podiatric Surgery Resident  Podiatry On Call Pager: [phone]  August 30, 2020  1:02 PM

## 2020-08-30 NOTE — ACP (ADVANCE CARE PLANNING)
ill and were unable to breathe on your own, what would your preference be about the use of a ventilator (breathing machine) if it were available to you? \"      Would the patient desire the use of ventilator (breathing machine)?: yes    \"If your health worsens and it becomes clear that your chance of recovery is unlikely, what would your preference be about the use of a ventilator (breathing machine) if it were available to you? \"     Would the patient desire the use of ventilator (breathing machine)?: Yes      Resuscitation  \"CPR works best to restart the heart when there is a sudden event, like a heart attack, in someone who is otherwise healthy. Unfortunately, CPR does not typically restart the heart for people who have serious health conditions or who are very sick. \"    \"In the event your heart stopped as a result of an underlying serious health condition, would you want attempts to be made to restart your heart (answer \"yes\" for attempt to resuscitate) or would you prefer a natural death (answer \"no\" for do not attempt to resuscitate)? \" yes      NOTE: If the patient has a valid advance directive AND now provides care preference(s) that are inconsistent with that prior directive, advise the patient to consider either: creating a new advance directive that complies with state-specific requirements; or, if that is not possible, orally revoking that prior directive in accordance with state-specific requirements, which must be documented in the EHR. [x] Yes   [] No   Educated Patient / Marvene Deal regarding differences between Advance Directives and portable DNR orders.     Information on advance directives given per pt request.    Length of ACP Conversation in minutes:  10    Conversation Outcomes:  [x] ACP discussion completed  [] Existing advance directive reviewed with patient; no changes to patient's previously recorded wishes  [] New Advance Directive completed  [] Portable Do Not Rescitate prepared for Provider review and signature  [] POLST/POST/MOLST/MOST prepared for Provider review and signature      Follow-up plan:    [] Schedule follow-up conversation to continue planning  [x] Referred individual to Provider for additional questions/concerns   [] Advised patient/agent/surrogate to review completed ACP document and update if needed with changes in condition, patient preferences or care setting    [x] This note routed to one or more involved healthcare providers

## 2020-08-30 NOTE — PROGRESS NOTES
Pharmacy Note  Vancomycin Consult    Ruy Leblanc is a 56 y.o. female started on Vancomycin for cellulitis of the left foot; consult received from Dr. Demario Yun to manage therapy. Also receiving the following antibiotics: Ancef. Patient Active Problem List   Diagnosis    Cellulitis of foot    Open wound of left foot    DM type 2 (diabetes mellitus, type 2) ([facility]    Hypertension    YESENIA (acute kidney injury) ([facility]    Non-pressure chronic ulcer of other part of left foot with fat layer exposed ([facility]    Cellulitis       Allergies:  Cat hair extract; Penicillins; Statins; Sulfa antibiotics; and Trimethoprim     Temp max: 99.5    Recent Labs     08/30/20  1015   BUN 16       Recent Labs     08/30/20  1015   CREATININE 0.74       Recent Labs     08/30/20  1015   WBC 20.4*       No intake or output data in the 24 hours ending 08/30/20 1330    Culture Date      Source                       Results  Procedure  Component  Value  Units  Date/Time    Culture, MRSA, Screening [[id number]]      Order Status: Sent  Specimen: Nares     Culture, Anaerobic and Aerobic [[id number]]   Collected: 08/30/20 1309    Order Status: Sent  Specimen: Foot     Culture, Blood 2 [[id number]]   Collected: 08/30/20 1104    Order Status: Sent  Specimen: Blood  Updated: 08/30/20 1135    Culture, Blood 1 [[id number]]   Collected: 08/30/20 1015    Order Status: Sent  Specimen: Blood  Updated: 08/30/20 1135        Ht Readings from Last 1 Encounters:   08/30/20 5' 6\" (1.676 m)        Wt Readings from Last 1 Encounters:   08/30/20 250 lb (113.4 kg)         Body mass index is 40.35 kg/m². Estimated Creatinine Clearance: 101 mL/min (based on SCr of 0.74 mg/dL). Goal Trough Level: 10-20 mcg/mL    Assessment/Plan:  Will initiate vancomycin 1,500 mg IV every 12 hours based on the patient's weight and renal function. Patient received one dose of vancomycin 1,750 mg IV in the ED today at 1134.  Will schedule new dose to begin tonight at 2330.    Obtain trough 30 minutes before the 4th dose, on 08/31/20 at 2300. Thank you for the consult. Pharmacy will continue to follow.     Domonique Gan, PharmGILL   8/30/2020 1:38 PM

## 2020-08-30 NOTE — ED TRIAGE NOTES
Pt here with complaints of an infection in her left foot. She was admitted here about three weeks ago for IV antibiotics. She does have a physician who manages her foot. She was given PO antibiotics around 8/15. She was instructed to take them if her foot becomes red. She started taking antibiotics with no relief. Today her foot is red, swollen, and painful.

## 2020-08-31 ENCOUNTER — ANESTHESIA EVENT (OUTPATIENT)
Dept: OPERATING ROOM | Age: 60
DRG: 629 | End: 2020-08-31
Payer: COMMERCIAL

## 2020-08-31 LAB
BASOPHILS ABSOLUTE: 0 K/UL (ref 0–0.2)
BASOPHILS RELATIVE PERCENT: 0.2 %
EOSINOPHILS ABSOLUTE: 0.3 K/UL (ref 0–0.7)
EOSINOPHILS RELATIVE PERCENT: 2.8 %
GLUCOSE BLD-MCNC: 159 MG/DL (ref 60–115)
GLUCOSE BLD-MCNC: 178 MG/DL (ref 60–115)
GLUCOSE BLD-MCNC: 219 MG/DL (ref 60–115)
GLUCOSE BLD-MCNC: 225 MG/DL (ref 60–115)
HCT VFR BLD CALC: 32.8 % (ref 37–47)
HEMOGLOBIN: 10.6 G/DL (ref 12–16)
LYMPHOCYTES ABSOLUTE: 1.5 K/UL (ref 1–4.8)
LYMPHOCYTES RELATIVE PERCENT: 12.8 %
MCH RBC QN AUTO: 25.8 PG (ref 27–31.3)
MCHC RBC AUTO-ENTMCNC: 32.3 % (ref 33–37)
MCV RBC AUTO: 80 FL (ref 82–100)
MONOCYTES ABSOLUTE: 0.9 K/UL (ref 0.2–0.8)
MONOCYTES RELATIVE PERCENT: 8.1 %
NEUTROPHILS ABSOLUTE: 8.8 K/UL (ref 1.4–6.5)
NEUTROPHILS RELATIVE PERCENT: 76.1 %
PDW BLD-RTO: 15.5 % (ref 11.5–14.5)
PERFORMED ON: ABNORMAL
PLATELET # BLD: 363 K/UL (ref 130–400)
RBC # BLD: 4.1 M/UL (ref 4.2–5.4)
WBC # BLD: 11.5 K/UL (ref 4.8–10.8)

## 2020-08-31 PROCEDURE — 6360000002 HC RX W HCPCS: Performed by: INTERNAL MEDICINE

## 2020-08-31 PROCEDURE — 2060000000 HC ICU INTERMEDIATE R&B

## 2020-08-31 PROCEDURE — 2580000003 HC RX 258: Performed by: INTERNAL MEDICINE

## 2020-08-31 PROCEDURE — 36415 COLL VENOUS BLD VENIPUNCTURE: CPT

## 2020-08-31 PROCEDURE — 85025 COMPLETE CBC W/AUTO DIFF WBC: CPT

## 2020-08-31 PROCEDURE — 6370000000 HC RX 637 (ALT 250 FOR IP): Performed by: INTERNAL MEDICINE

## 2020-08-31 RX ORDER — DIPHENHYDRAMINE HCL 25 MG
25 TABLET ORAL EVERY 6 HOURS PRN
Status: DISCONTINUED | OUTPATIENT
Start: 2020-08-31 | End: 2020-09-04 | Stop reason: HOSPADM

## 2020-08-31 RX ADMIN — VANCOMYCIN HYDROCHLORIDE 1500 MG: 5 INJECTION, POWDER, LYOPHILIZED, FOR SOLUTION INTRAVENOUS at 13:29

## 2020-08-31 RX ADMIN — METFORMIN HYDROCHLORIDE 1000 MG: 500 TABLET ORAL at 08:54

## 2020-08-31 RX ADMIN — OXYCODONE HYDROCHLORIDE AND ACETAMINOPHEN 1 TABLET: 5; 325 TABLET ORAL at 15:13

## 2020-08-31 RX ADMIN — METFORMIN HYDROCHLORIDE 1000 MG: 500 TABLET ORAL at 16:57

## 2020-08-31 RX ADMIN — DIPHENHYDRAMINE HCL 25 MG: 25 TABLET ORAL at 13:29

## 2020-08-31 RX ADMIN — OXYCODONE HYDROCHLORIDE AND ACETAMINOPHEN 1 TABLET: 5; 325 TABLET ORAL at 09:04

## 2020-08-31 RX ADMIN — GLIPIZIDE 5 MG: 5 TABLET ORAL at 06:07

## 2020-08-31 RX ADMIN — INSULIN GLARGINE 14 UNITS: 100 INJECTION, SOLUTION SUBCUTANEOUS at 21:57

## 2020-08-31 RX ADMIN — OXYCODONE HYDROCHLORIDE AND ACETAMINOPHEN 1 TABLET: 5; 325 TABLET ORAL at 21:57

## 2020-08-31 RX ADMIN — MEROPENEM 1 G: 1 INJECTION, POWDER, FOR SOLUTION INTRAVENOUS at 21:57

## 2020-08-31 RX ADMIN — LISINOPRIL 20 MG: 20 TABLET ORAL at 08:54

## 2020-08-31 RX ADMIN — Medication 10 ML: at 21:57

## 2020-08-31 RX ADMIN — MEROPENEM 1 G: 1 INJECTION, POWDER, FOR SOLUTION INTRAVENOUS at 06:07

## 2020-08-31 RX ADMIN — Medication 10 ML: at 08:55

## 2020-08-31 RX ADMIN — MEROPENEM 1 G: 1 INJECTION, POWDER, FOR SOLUTION INTRAVENOUS at 15:13

## 2020-08-31 ASSESSMENT — PAIN SCALES - GENERAL
PAINLEVEL_OUTOF10: 0
PAINLEVEL_OUTOF10: 6
PAINLEVEL_OUTOF10: 7
PAINLEVEL_OUTOF10: 0
PAINLEVEL_OUTOF10: 6
PAINLEVEL_OUTOF10: 0
PAINLEVEL_OUTOF10: 0

## 2020-08-31 ASSESSMENT — PAIN DESCRIPTION - PAIN TYPE: TYPE: ACUTE PAIN

## 2020-08-31 ASSESSMENT — PAIN DESCRIPTION - LOCATION: LOCATION: FOOT

## 2020-08-31 ASSESSMENT — PAIN DESCRIPTION - ORIENTATION: ORIENTATION: LEFT

## 2020-08-31 ASSESSMENT — PAIN DESCRIPTION - DESCRIPTORS: DESCRIPTORS: ACHING

## 2020-08-31 NOTE — PROGRESS NOTES
PODIATRIC MEDICINE INPATIENT PROGRESS NOTE    Patient Name: Bobbi King  MRN: [id number]    FOLLOW UP REGARDING:  Left foot wound    ASSESSMENT:   56 y.o. female with PMH below for who podiatry was consulted for left foot wound. Patient is known to podiatry and was last seen a few weeks ago in the wound care center. She presented with abscess in the left foot which is a recurrent issue and we will pan for left 5th met head resection on Wednesday after a few days of IV ABX. PLAN AND RECOMMENDATIONS:  Plan for OR Wednesday 9/2 for debridement of all non-viable soft tissue and bone with 5th metatarsal head resection, left foot. Needs Cardiac and Medical clearance for OR  Hold all blood thinners starting Tuesday   NPO starting Tuesday at midnight  Please optimize patient for surgery     Wound care: Iodoform packing and DSD performed today. Heel WB to left foot  Patient should elevate BLE at or above heart level while resting  Antibiotic coverage per primary team or ID recommendations   Pain management per primary team   Podiatry to follow while in house   Patient will need follow up within within 7-10 days of discharge     Patient to be discussed with staff, Dr. Bard Santamaria, who will provide final recommendations going forward. INTERVAL HISTORY  NAEON. VSS. Afebrile  No nausea, vomiting, fever, chills, SOB, or CP  Minimal discomfort to the left foot      OBJECTIVE:  BP (!) 125/53   Pulse 62   Temp 98.4 °F (36.9 °C) (Oral)   Resp 16   Ht 5' 6\" (1.676 m)   Wt 250 lb (113.4 kg)   SpO2 97%   BMI 40.35 kg/m²      Patient is alert and oriented x 3 in NAD.      Vascular:   Palpable Dorsalis Pedis and Palpable Posterior Tibial Pulses B/L   Capillary Fill time < 3 seconds to B/L digits  Skin temperature warm to warm tibial tuberosity to the digits B/L  Hair growth sparse to digits  Mild edema  + lymphangitis   No varicosities      Neurological:   Sensation to light touch impaired B/L  Protective sensation via monofilament testing impaired B/L     Musculoskeletal/Orthopaedic:   Structural Deformities: None noted  5/5 muscle strength Dorsiflexion, Plantarflexion, Inversion, Eversion B/L  Mild tenderness on palpation of lateral forefoot ulceration      Dermatological:   Skin appears well hydrated and supple with good temperature, texture, turgor  Plantar 5th met head wound noted  Nails 1-5 B/L appear WNL  Interspaces 1-4 B/L are clear and without debris     Ulceration #1:   Location: Plantar 5th met head   Measurements: ~1.5 cm x 1 cm x 0.4 cm (to level of periosteum)   Base: fibrotic  Borders: hyperkeratotic   Exudate:  No pus this AM. Serosanginous drainage  Comments: + periwound erythema and edema extending beyond wound borders constistent with cellulitis.  Wound does not probe to bone but to level of periosteum   Erythema has spread to encompass the majority of the dorsal foot and has spread about 2 cm beyond the original tracing     See images in Media tab      LABORATORY:  Recent Labs     20  1015 20  0550   WBC 20.4* 11.5*   HGB 11.5* 10.6*   HCT 34.8* 32.8*   * 363     Recent Labs     20  1015      K 4.2   CL 98   CO2 24   BUN 16   CREATININE 0.74   CALCIUM 9.0   PROT 7.1     Lab Results   Component Value Date    LABALBU 3.7 2020     Lab Results   Component Value Date    SEDRATE 31 (H) 2020     No results found for: CRP  Lab Results   Component Value Date    LABA1C 9.3 (H) 2020     No results found for: EAG    MICROBIOLOGY:  : Pre adler    Moderate WBC's   Moderate epithelial cells   Moderate Gram positive cocci in pairs, clusters     IMAGIN/30: XR left foot    FINDINGS:         Lateral forefoot swelling appears worsened when compared to the prior studies.         There is no soft tissue emphysema, fracture, progressive bone destruction, or other significant changes identified.         Degenerative changes predominantly of the talocuneiform, tarsometatarsal, and ankle joints are again noted.             Patient's case discussed with staff and agrees with final recommendations outlined above.       Kristine Gould, PGY-2  Please first page Podiatry On Call, [phone]  August 31, 2020  10:31 AM

## 2020-08-31 NOTE — PROGRESS NOTES
Hospitalist Progress Note      Date of Admission: 8/30/2020  Chief Complaint:    Chief Complaint   Patient presents with    Foot Pain     left foot; red, swollen, painful     Subjective:  No new complaints. No nausea, vomiting, chest pain, or headache      Medications:    Infusion Medications   Scheduled Medications    sodium chloride flush  10 mL Intravenous 2 times per day    enoxaparin  40 mg Subcutaneous Daily    glipiZIDE  5 mg Oral QAM AC    insulin glargine  14 Units Subcutaneous Nightly    lisinopril  20 mg Oral Daily    metFORMIN  2,000 mg Oral BID WC    vancomycin  1,500 mg Intravenous Q12H    vancomycin (VANCOCIN) intermittent dosing (placeholder)   Other RX Placeholder    meropenem  1 g Intravenous Q8H     PRN Meds: diphenhydrAMINE, sodium chloride flush, acetaminophen **OR** acetaminophen, polyethylene glycol, promethazine **OR** ondansetron, oxyCODONE-acetaminophen    Intake/Output Summary (Last 24 hours) at 8/31/2020 1402  Last data filed at 8/31/2020 1255  Gross per 24 hour   Intake 1080 ml   Output --   Net 1080 ml     Exam:  BP (!) 125/53   Pulse 62   Temp 98.4 °F (36.9 °C) (Oral)   Resp 16   Ht 5' 6\" (1.676 m)   Wt 250 lb (113.4 kg)   SpO2 97%   BMI 40.35 kg/m²   Head: Normocephalic, atraumatic  Sclera clear  Neck supple, nontender  Lungs: clear    Labs:   Recent Labs     08/30/20  1015 08/31/20  0550   WBC 20.4* 11.5*   HGB 11.5* 10.6*   HCT 34.8* 32.8*   * 363     Recent Labs     08/30/20  1015      K 4.2   CL 98   CO2 24   BUN 16   CREATININE 0.74   CALCIUM 9.0   AST 18   ALT 23   BILITOT 0.4   ALKPHOS 82     No results for input(s): INR in the last 72 hours. No results for input(s): Francisco J Lust in the last 72 hours. Radiology:  US DUP LOWER EXTREMITY LEFT PRAMOD   Final Result      NO LEFT LOWER EXTREMITY DVT IDENTIFIED. XR FOOT LEFT (MIN 3 VIEWS)   Final Result      WORSENING LATERAL FOREFOOT SOFT TISSUE SWELLING.       NO RADIOGRAPHIC EVIDENCE

## 2020-08-31 NOTE — PROGRESS NOTES
1930: RN assumed care of patient. Pt resting in bed, watching TV, no distress noted at this time. 2033: Pt complaining of 8/10 to her left foot, medicated with PRN medication see EMAR.     2200: Assessment complete; Pt alert and oriented X4, PERRLA, neuro assessment unremarkable. Lung sounds clear, heart sounds normal, bowel sounds active in all four quadrants. Abdomen is soft and non tender. Pt has +1 non-pitting edema to bilateral lower extremities. Pulses palpable bilaterally. Capillary refill is less than 3 seconds bilaterally. Pt's left foot is wrapped in a dressing done by podiatry on 8/30/2020. Dressing in clean, dry and intact. Pt is partial weight bearing to left foot, independent in the room, gait is steady. Pt denies chest pain, SOB, headaches, visual disturbances, nausea or vomiting.     2300: pt medicated with IV benadryl prior to Vanco infusion, Vanco infusion started, pt tolerating no complaints at this time. 0000: pt resting in bed, with eyes closed and respirations >10.  Call light in reach, will continue to monitor Electronically signed by Bruno Sinha RN on 8/31/2020 at 12:51 AM

## 2020-08-31 NOTE — PLAN OF CARE
Problem: Pain:  Goal: Pain level will decrease  Description: Pain level will decrease  Outcome: Ongoing  Goal: Control of acute pain  Description: Control of acute pain  Outcome: Ongoing  Goal: Control of chronic pain  Description: Control of chronic pain  Outcome: Ongoing     Problem:  Activity:  Goal: Risk for activity intolerance will decrease  Description: Risk for activity intolerance will decrease  Outcome: Ongoing     Problem: Fluid Volume:  Goal: Ability to maintain a balanced intake and output will improve  Description: Ability to maintain a balanced intake and output will improve  Outcome: Ongoing     Problem: Metabolic:  Goal: Ability to maintain appropriate glucose levels will improve  Description: Ability to maintain appropriate glucose levels will improve  Outcome: Ongoing     Problem: Skin Integrity:  Goal: Risk for impaired skin integrity will decrease  Description: Risk for impaired skin integrity will decrease  Outcome: Ongoing  Goal: Will show no infection signs and symptoms  Description: Will show no infection signs and symptoms  Outcome: Ongoing  Goal: Absence of new skin breakdown  Description: Absence of new skin breakdown  Outcome: Ongoing     Problem: Tissue Perfusion:  Goal: Adequacy of tissue perfusion will improve  Description: Adequacy of tissue perfusion will improve  Outcome: Ongoing     Problem: Discharge Planning:  Goal: Participates in care planning  Description: Participates in care planning  Outcome: Ongoing  Goal: Discharged to appropriate level of care  Description: Discharged to appropriate level of care  Outcome: Ongoing     Problem: Serum Glucose Level - Abnormal:  Goal: Ability to maintain appropriate glucose levels will improve to within specified parameters  Description: Ability to maintain appropriate glucose levels will improve to within specified parameters  Outcome: Ongoing     Problem: Skin Integrity - Impaired:  Goal: Will show no infection signs and symptoms  Description: Will show no infection signs and symptoms  Outcome: Ongoing  Goal: Absence of new skin breakdown  Description: Absence of new skin breakdown  Outcome: Ongoing

## 2020-09-01 LAB
BASOPHILS ABSOLUTE: 0 K/UL (ref 0–0.2)
BASOPHILS RELATIVE PERCENT: 0.3 %
EOSINOPHILS ABSOLUTE: 0.5 K/UL (ref 0–0.7)
EOSINOPHILS RELATIVE PERCENT: 5 %
GLUCOSE BLD-MCNC: 152 MG/DL (ref 60–115)
GLUCOSE BLD-MCNC: 187 MG/DL (ref 60–115)
GLUCOSE BLD-MCNC: 204 MG/DL (ref 60–115)
GLUCOSE BLD-MCNC: 211 MG/DL (ref 60–115)
HCT VFR BLD CALC: 33.8 % (ref 37–47)
HEMOGLOBIN: 10.9 G/DL (ref 12–16)
LYMPHOCYTES ABSOLUTE: 1.7 K/UL (ref 1–4.8)
LYMPHOCYTES RELATIVE PERCENT: 17.2 %
MCH RBC QN AUTO: 26 PG (ref 27–31.3)
MCHC RBC AUTO-ENTMCNC: 32.4 % (ref 33–37)
MCV RBC AUTO: 80.2 FL (ref 82–100)
MONOCYTES ABSOLUTE: 1 K/UL (ref 0.2–0.8)
MONOCYTES RELATIVE PERCENT: 10.1 %
MRSA CULTURE ONLY: NORMAL
NEUTROPHILS ABSOLUTE: 6.6 K/UL (ref 1.4–6.5)
NEUTROPHILS RELATIVE PERCENT: 67.4 %
PDW BLD-RTO: 15.6 % (ref 11.5–14.5)
PERFORMED ON: ABNORMAL
PLATELET # BLD: 376 K/UL (ref 130–400)
RBC # BLD: 4.21 M/UL (ref 4.2–5.4)
URIC ACID, SERUM: 6.4 MG/DL (ref 2.4–5.7)
VANCOMYCIN TROUGH: 12.3 UG/ML (ref 10–20)
WBC # BLD: 9.8 K/UL (ref 4.8–10.8)

## 2020-09-01 PROCEDURE — 2060000000 HC ICU INTERMEDIATE R&B

## 2020-09-01 PROCEDURE — 6360000002 HC RX W HCPCS: Performed by: INTERNAL MEDICINE

## 2020-09-01 PROCEDURE — 2580000003 HC RX 258: Performed by: INTERNAL MEDICINE

## 2020-09-01 PROCEDURE — 84550 ASSAY OF BLOOD/URIC ACID: CPT

## 2020-09-01 PROCEDURE — 36415 COLL VENOUS BLD VENIPUNCTURE: CPT

## 2020-09-01 PROCEDURE — 6370000000 HC RX 637 (ALT 250 FOR IP): Performed by: INTERNAL MEDICINE

## 2020-09-01 PROCEDURE — 85025 COMPLETE CBC W/AUTO DIFF WBC: CPT

## 2020-09-01 PROCEDURE — 80202 ASSAY OF VANCOMYCIN: CPT

## 2020-09-01 PROCEDURE — 2580000003 HC RX 258: Performed by: STUDENT IN AN ORGANIZED HEALTH CARE EDUCATION/TRAINING PROGRAM

## 2020-09-01 RX ORDER — SODIUM CHLORIDE 0.9 % (FLUSH) 0.9 %
10 SYRINGE (ML) INJECTION PRN
Status: DISCONTINUED | OUTPATIENT
Start: 2020-09-01 | End: 2020-09-02

## 2020-09-01 RX ORDER — SODIUM CHLORIDE 0.9 % (FLUSH) 0.9 %
10 SYRINGE (ML) INJECTION EVERY 12 HOURS SCHEDULED
Status: DISCONTINUED | OUTPATIENT
Start: 2020-09-01 | End: 2020-09-02

## 2020-09-01 RX ADMIN — METFORMIN HYDROCHLORIDE 1000 MG: 500 TABLET ORAL at 09:17

## 2020-09-01 RX ADMIN — DIPHENHYDRAMINE HCL 25 MG: 25 TABLET ORAL at 02:28

## 2020-09-01 RX ADMIN — VANCOMYCIN HYDROCHLORIDE 1500 MG: 5 INJECTION, POWDER, LYOPHILIZED, FOR SOLUTION INTRAVENOUS at 02:28

## 2020-09-01 RX ADMIN — MEROPENEM 1 G: 1 INJECTION, POWDER, FOR SOLUTION INTRAVENOUS at 21:43

## 2020-09-01 RX ADMIN — MEROPENEM 1 G: 1 INJECTION, POWDER, FOR SOLUTION INTRAVENOUS at 16:23

## 2020-09-01 RX ADMIN — Medication 10 ML: at 21:42

## 2020-09-01 RX ADMIN — DIPHENHYDRAMINE HCL 25 MG: 25 TABLET ORAL at 13:01

## 2020-09-01 RX ADMIN — GLIPIZIDE 5 MG: 5 TABLET ORAL at 06:19

## 2020-09-01 RX ADMIN — VANCOMYCIN HYDROCHLORIDE 1500 MG: 5 INJECTION, POWDER, LYOPHILIZED, FOR SOLUTION INTRAVENOUS at 13:19

## 2020-09-01 RX ADMIN — METFORMIN HYDROCHLORIDE 1000 MG: 500 TABLET ORAL at 16:23

## 2020-09-01 RX ADMIN — OXYCODONE HYDROCHLORIDE AND ACETAMINOPHEN 1 TABLET: 5; 325 TABLET ORAL at 16:24

## 2020-09-01 RX ADMIN — MEROPENEM 1 G: 1 INJECTION, POWDER, FOR SOLUTION INTRAVENOUS at 06:20

## 2020-09-01 RX ADMIN — Medication 10 ML: at 21:43

## 2020-09-01 RX ADMIN — OXYCODONE HYDROCHLORIDE AND ACETAMINOPHEN 1 TABLET: 5; 325 TABLET ORAL at 23:26

## 2020-09-01 RX ADMIN — INSULIN GLARGINE 14 UNITS: 100 INJECTION, SOLUTION SUBCUTANEOUS at 21:43

## 2020-09-01 RX ADMIN — LISINOPRIL 20 MG: 20 TABLET ORAL at 09:17

## 2020-09-01 RX ADMIN — Medication 10 ML: at 09:17

## 2020-09-01 ASSESSMENT — PAIN SCALES - GENERAL
PAINLEVEL_OUTOF10: 5
PAINLEVEL_OUTOF10: 6
PAINLEVEL_OUTOF10: 6

## 2020-09-01 NOTE — FLOWSHEET NOTE
Assessment complete. Pt A & O x4. Lungs clear. Bowel sounds present. Pt states 5/10 pain from sciatica nerve but does not want pain medication at this time. Dressing to left foot is clean/dry/intact. Podiatry has been changing dressing. Pt states she takes Metformin 1000 mg BID not 2000 mg will inform provider. 1300 - perfect serve sent to provider about patient stated she takes Metformin 1000 mg oral BID.

## 2020-09-01 NOTE — PROGRESS NOTES
PODIATRIC MEDICINE INPATIENT PROGRESS NOTE    Patient Name: Eloisa Gan  MRN: [id number]    FOLLOW UP REGARDING:  Left foot wound    ASSESSMENT:   56 y.o. female with PMH below for who podiatry was consulted for left foot wound. Patient is known to podiatry and was last seen a few weeks ago in the wound care center. She presented with abscess in the left foot which is a recurrent issue and we will pan for left 5th met head resection on Wednesday after a few days of IV ABX. PLAN AND RECOMMENDATIONS:    Plan for OR tomorrow 9/2 for debridement of all non-viable soft tissue and bone with 5th metatarsal head resection, left foot. Needs Cardiac and Medical clearance for OR  Hold all blood thinners starting Tuesday   NPO starting Tuesday at midnight  Please optimize patient for surgery       Wound care: Iodoform packing and DSD performed today. Ordered Uric acid   Heel WB to left foot  Patient should elevate BLE at or above heart level while resting  Antibiotic coverage per primary team or ID recommendations   Pain management per primary team   Podiatry to follow while in house   Patient will need follow up within within 7-10 days of discharge     Patient to be discussed with staff, Dr. Roxanna Villareal, who will provide final recommendations going forward. INTERVAL HISTORY  NAEON. VSS. Afebrile  No nausea, vomiting, fever, chills, SOB, or CP  Minimal discomfort to the left foot        OBJECTIVE:  BP (!) 145/47   Pulse 65   Temp 98.1 °F (36.7 °C) (Oral)   Resp 16   Ht 5' 6\" (1.676 m)   Wt 250 lb (113.4 kg)   SpO2 97%   BMI 40.35 kg/m²      Patient is alert and oriented x 3 in NAD.      Vascular:   Palpable Dorsalis Pedis and Palpable Posterior Tibial Pulses B/L   Capillary Fill time < 3 seconds to B/L digits  Skin temperature warm to warm tibial tuberosity to the digits B/L  Hair growth sparse to digits  Mild edema  + lymphangitis   No varicosities      Neurological:   Sensation to light significant changes identified.         Degenerative changes predominantly of the talocuneiform, tarsometatarsal, and ankle joints are again noted.             Patient's case discussed with staff and agrees with final recommendations outlined above.       Edy Guerrero, PGY-2  Please first page Podiatry On Call, [phone]  September 1, 2020  2:26 PM

## 2020-09-01 NOTE — FLOWSHEET NOTE
Crystal Rodriguez 281 care of pt from [facility].    2150 PM assessment and medications completed. Merrem infusing. Pt denies further needs at this time.

## 2020-09-02 ENCOUNTER — APPOINTMENT (OUTPATIENT)
Dept: ULTRASOUND IMAGING | Age: 60
DRG: 629 | End: 2020-09-02
Payer: COMMERCIAL

## 2020-09-02 ENCOUNTER — ANESTHESIA (OUTPATIENT)
Dept: OPERATING ROOM | Age: 60
DRG: 629 | End: 2020-09-02
Payer: COMMERCIAL

## 2020-09-02 VITALS — SYSTOLIC BLOOD PRESSURE: 101 MMHG | DIASTOLIC BLOOD PRESSURE: 56 MMHG | OXYGEN SATURATION: 99 %

## 2020-09-02 LAB
ANAEROBIC CULTURE: ABNORMAL
ANAEROBIC CULTURE: ABNORMAL
BASOPHILS ABSOLUTE: 0.1 K/UL (ref 0–0.2)
BASOPHILS RELATIVE PERCENT: 0.6 %
EKG ATRIAL RATE: 63 BPM
EKG P AXIS: 67 DEGREES
EKG P-R INTERVAL: 150 MS
EKG Q-T INTERVAL: 402 MS
EKG QRS DURATION: 84 MS
EKG QTC CALCULATION (BAZETT): 411 MS
EKG R AXIS: 14 DEGREES
EKG T AXIS: 48 DEGREES
EKG VENTRICULAR RATE: 63 BPM
EOSINOPHILS ABSOLUTE: 0.5 K/UL (ref 0–0.7)
EOSINOPHILS RELATIVE PERCENT: 5.5 %
GLUCOSE BLD-MCNC: 179 MG/DL (ref 60–115)
GLUCOSE BLD-MCNC: 184 MG/DL (ref 60–115)
GLUCOSE BLD-MCNC: 196 MG/DL (ref 60–115)
GLUCOSE BLD-MCNC: 268 MG/DL (ref 60–115)
GRAM STAIN RESULT: ABNORMAL
HCT VFR BLD CALC: 33.3 % (ref 37–47)
HEMOGLOBIN: 10.9 G/DL (ref 12–16)
LV EF: 55 %
LVEF MODALITY: NORMAL
LYMPHOCYTES ABSOLUTE: 1.7 K/UL (ref 1–4.8)
LYMPHOCYTES RELATIVE PERCENT: 19 %
MCH RBC QN AUTO: 26 PG (ref 27–31.3)
MCHC RBC AUTO-ENTMCNC: 32.8 % (ref 33–37)
MCV RBC AUTO: 79.2 FL (ref 82–100)
MONOCYTES ABSOLUTE: 1 K/UL (ref 0.2–0.8)
MONOCYTES RELATIVE PERCENT: 10.9 %
NEUTROPHILS ABSOLUTE: 5.9 K/UL (ref 1.4–6.5)
NEUTROPHILS RELATIVE PERCENT: 64 %
ORGANISM: ABNORMAL
PDW BLD-RTO: 15.5 % (ref 11.5–14.5)
PERFORMED ON: ABNORMAL
PLATELET # BLD: 447 K/UL (ref 130–400)
RBC # BLD: 4.21 M/UL (ref 4.2–5.4)
WBC # BLD: 9.2 K/UL (ref 4.8–10.8)
WOUND/ABSCESS: ABNORMAL
WOUND/ABSCESS: ABNORMAL

## 2020-09-02 PROCEDURE — 88304 TISSUE EXAM BY PATHOLOGIST: CPT

## 2020-09-02 PROCEDURE — 93306 TTE W/DOPPLER COMPLETE: CPT

## 2020-09-02 PROCEDURE — 6360000002 HC RX W HCPCS: Performed by: STUDENT IN AN ORGANIZED HEALTH CARE EDUCATION/TRAINING PROGRAM

## 2020-09-02 PROCEDURE — 6370000000 HC RX 637 (ALT 250 FOR IP): Performed by: STUDENT IN AN ORGANIZED HEALTH CARE EDUCATION/TRAINING PROGRAM

## 2020-09-02 PROCEDURE — 85025 COMPLETE CBC W/AUTO DIFF WBC: CPT

## 2020-09-02 PROCEDURE — 36415 COLL VENOUS BLD VENIPUNCTURE: CPT

## 2020-09-02 PROCEDURE — 2060000000 HC ICU INTERMEDIATE R&B

## 2020-09-02 PROCEDURE — 64445 NJX AA&/STRD SCIATIC NRV IMG: CPT | Performed by: NURSE ANESTHETIST, CERTIFIED REGISTERED

## 2020-09-02 PROCEDURE — 99254 IP/OBS CNSLTJ NEW/EST MOD 60: CPT | Performed by: INTERNAL MEDICINE

## 2020-09-02 PROCEDURE — 3600000002 HC SURGERY LEVEL 2 BASE: Performed by: PODIATRIST

## 2020-09-02 PROCEDURE — 7100000001 HC PACU RECOVERY - ADDTL 15 MIN: Performed by: PODIATRIST

## 2020-09-02 PROCEDURE — 2709999900 HC NON-CHARGEABLE SUPPLY: Performed by: PODIATRIST

## 2020-09-02 PROCEDURE — 2720000010 HC SURG SUPPLY STERILE: Performed by: PODIATRIST

## 2020-09-02 PROCEDURE — 3700000001 HC ADD 15 MINUTES (ANESTHESIA): Performed by: PODIATRIST

## 2020-09-02 PROCEDURE — 6370000000 HC RX 637 (ALT 250 FOR IP): Performed by: INTERNAL MEDICINE

## 2020-09-02 PROCEDURE — 6360000002 HC RX W HCPCS: Performed by: ANESTHESIOLOGY

## 2020-09-02 PROCEDURE — 93925 LOWER EXTREMITY STUDY: CPT | Performed by: INTERNAL MEDICINE

## 2020-09-02 PROCEDURE — 3700000000 HC ANESTHESIA ATTENDED CARE: Performed by: PODIATRIST

## 2020-09-02 PROCEDURE — 2500000003 HC RX 250 WO HCPCS: Performed by: ANESTHESIOLOGY

## 2020-09-02 PROCEDURE — 3600000012 HC SURGERY LEVEL 2 ADDTL 15MIN: Performed by: PODIATRIST

## 2020-09-02 PROCEDURE — 87075 CULTR BACTERIA EXCEPT BLOOD: CPT

## 2020-09-02 PROCEDURE — 2580000003 HC RX 258: Performed by: PODIATRIST

## 2020-09-02 PROCEDURE — 2580000003 HC RX 258: Performed by: STUDENT IN AN ORGANIZED HEALTH CARE EDUCATION/TRAINING PROGRAM

## 2020-09-02 PROCEDURE — 93005 ELECTROCARDIOGRAM TRACING: CPT | Performed by: ANESTHESIOLOGY

## 2020-09-02 PROCEDURE — 87070 CULTURE OTHR SPECIMN AEROBIC: CPT

## 2020-09-02 PROCEDURE — 6360000002 HC RX W HCPCS: Performed by: INTERNAL MEDICINE

## 2020-09-02 PROCEDURE — 0QBP0ZZ EXCISION OF LEFT METATARSAL, OPEN APPROACH: ICD-10-PCS | Performed by: PODIATRIST

## 2020-09-02 PROCEDURE — 2580000003 HC RX 258

## 2020-09-02 PROCEDURE — 6360000002 HC RX W HCPCS: Performed by: NURSE ANESTHETIST, CERTIFIED REGISTERED

## 2020-09-02 PROCEDURE — 93925 LOWER EXTREMITY STUDY: CPT

## 2020-09-02 PROCEDURE — 87205 SMEAR GRAM STAIN: CPT

## 2020-09-02 PROCEDURE — 2580000003 HC RX 258: Performed by: INTERNAL MEDICINE

## 2020-09-02 PROCEDURE — 7100000000 HC PACU RECOVERY - FIRST 15 MIN: Performed by: PODIATRIST

## 2020-09-02 PROCEDURE — 93005 ELECTROCARDIOGRAM TRACING: CPT | Performed by: INTERNAL MEDICINE

## 2020-09-02 RX ORDER — HYDROCODONE BITARTRATE AND ACETAMINOPHEN 5; 325 MG/1; MG/1
2 TABLET ORAL PRN
Status: DISCONTINUED | OUTPATIENT
Start: 2020-09-02 | End: 2020-09-02

## 2020-09-02 RX ORDER — SODIUM CHLORIDE 0.9 % (FLUSH) 0.9 %
10 SYRINGE (ML) INJECTION EVERY 12 HOURS SCHEDULED
Status: DISCONTINUED | OUTPATIENT
Start: 2020-09-02 | End: 2020-09-04 | Stop reason: HOSPADM

## 2020-09-02 RX ORDER — HYDROCODONE BITARTRATE AND ACETAMINOPHEN 5; 325 MG/1; MG/1
1 TABLET ORAL PRN
Status: DISCONTINUED | OUTPATIENT
Start: 2020-09-02 | End: 2020-09-02

## 2020-09-02 RX ORDER — LABETALOL HYDROCHLORIDE 5 MG/ML
5 INJECTION, SOLUTION INTRAVENOUS EVERY 10 MIN PRN
Status: DISCONTINUED | OUTPATIENT
Start: 2020-09-02 | End: 2020-09-02

## 2020-09-02 RX ORDER — FENTANYL CITRATE 50 UG/ML
INJECTION, SOLUTION INTRAMUSCULAR; INTRAVENOUS PRN
Status: DISCONTINUED | OUTPATIENT
Start: 2020-09-02 | End: 2020-09-02 | Stop reason: SDUPTHER

## 2020-09-02 RX ORDER — ONDANSETRON 2 MG/ML
INJECTION INTRAMUSCULAR; INTRAVENOUS PRN
Status: DISCONTINUED | OUTPATIENT
Start: 2020-09-02 | End: 2020-09-02 | Stop reason: SDUPTHER

## 2020-09-02 RX ORDER — MIDAZOLAM HYDROCHLORIDE 1 MG/ML
INJECTION INTRAMUSCULAR; INTRAVENOUS PRN
Status: DISCONTINUED | OUTPATIENT
Start: 2020-09-02 | End: 2020-09-02 | Stop reason: SDUPTHER

## 2020-09-02 RX ORDER — SODIUM CHLORIDE, SODIUM LACTATE, POTASSIUM CHLORIDE, CALCIUM CHLORIDE 600; 310; 30; 20 MG/100ML; MG/100ML; MG/100ML; MG/100ML
INJECTION, SOLUTION INTRAVENOUS
Status: COMPLETED
Start: 2020-09-02 | End: 2020-09-02

## 2020-09-02 RX ORDER — MEPERIDINE HYDROCHLORIDE 25 MG/ML
12.5 INJECTION INTRAMUSCULAR; INTRAVENOUS; SUBCUTANEOUS EVERY 5 MIN PRN
Status: DISCONTINUED | OUTPATIENT
Start: 2020-09-02 | End: 2020-09-02

## 2020-09-02 RX ORDER — SODIUM CHLORIDE, SODIUM LACTATE, POTASSIUM CHLORIDE, CALCIUM CHLORIDE 600; 310; 30; 20 MG/100ML; MG/100ML; MG/100ML; MG/100ML
INJECTION, SOLUTION INTRAVENOUS CONTINUOUS
Status: DISCONTINUED | OUTPATIENT
Start: 2020-09-02 | End: 2020-09-02

## 2020-09-02 RX ORDER — DIPHENHYDRAMINE HYDROCHLORIDE 50 MG/ML
12.5 INJECTION INTRAMUSCULAR; INTRAVENOUS
Status: DISCONTINUED | OUTPATIENT
Start: 2020-09-02 | End: 2020-09-02

## 2020-09-02 RX ORDER — LIDOCAINE HYDROCHLORIDE 20 MG/ML
INJECTION, SOLUTION INFILTRATION; PERINEURAL
Status: COMPLETED | OUTPATIENT
Start: 2020-09-02 | End: 2020-09-02

## 2020-09-02 RX ORDER — ONDANSETRON 2 MG/ML
4 INJECTION INTRAMUSCULAR; INTRAVENOUS
Status: DISCONTINUED | OUTPATIENT
Start: 2020-09-02 | End: 2020-09-02

## 2020-09-02 RX ORDER — MORPHINE SULFATE 2 MG/ML
1 INJECTION, SOLUTION INTRAMUSCULAR; INTRAVENOUS EVERY 5 MIN PRN
Status: DISCONTINUED | OUTPATIENT
Start: 2020-09-02 | End: 2020-09-02

## 2020-09-02 RX ORDER — MAGNESIUM HYDROXIDE 1200 MG/15ML
LIQUID ORAL CONTINUOUS PRN
Status: COMPLETED | OUTPATIENT
Start: 2020-09-02 | End: 2020-09-02

## 2020-09-02 RX ORDER — FENTANYL CITRATE 50 UG/ML
25 INJECTION, SOLUTION INTRAMUSCULAR; INTRAVENOUS EVERY 5 MIN PRN
Status: DISCONTINUED | OUTPATIENT
Start: 2020-09-02 | End: 2020-09-02

## 2020-09-02 RX ORDER — PROPOFOL 10 MG/ML
INJECTION, EMULSION INTRAVENOUS CONTINUOUS PRN
Status: DISCONTINUED | OUTPATIENT
Start: 2020-09-02 | End: 2020-09-02 | Stop reason: SDUPTHER

## 2020-09-02 RX ORDER — SODIUM CHLORIDE 0.9 % (FLUSH) 0.9 %
10 SYRINGE (ML) INJECTION PRN
Status: DISCONTINUED | OUTPATIENT
Start: 2020-09-02 | End: 2020-09-04 | Stop reason: HOSPADM

## 2020-09-02 RX ORDER — 0.9 % SODIUM CHLORIDE 0.9 %
500 INTRAVENOUS SOLUTION INTRAVENOUS
Status: DISCONTINUED | OUTPATIENT
Start: 2020-09-02 | End: 2020-09-02

## 2020-09-02 RX ORDER — LORAZEPAM 2 MG/ML
1 INJECTION INTRAMUSCULAR ONCE
Status: COMPLETED | OUTPATIENT
Start: 2020-09-02 | End: 2020-09-02

## 2020-09-02 RX ORDER — PROPOFOL 10 MG/ML
INJECTION, EMULSION INTRAVENOUS PRN
Status: DISCONTINUED | OUTPATIENT
Start: 2020-09-02 | End: 2020-09-02 | Stop reason: SDUPTHER

## 2020-09-02 RX ORDER — DIPHENHYDRAMINE HYDROCHLORIDE 50 MG/ML
INJECTION INTRAMUSCULAR; INTRAVENOUS PRN
Status: DISCONTINUED | OUTPATIENT
Start: 2020-09-02 | End: 2020-09-02 | Stop reason: SDUPTHER

## 2020-09-02 RX ORDER — METOCLOPRAMIDE HYDROCHLORIDE 5 MG/ML
10 INJECTION INTRAMUSCULAR; INTRAVENOUS
Status: DISCONTINUED | OUTPATIENT
Start: 2020-09-02 | End: 2020-09-02

## 2020-09-02 RX ORDER — ROPIVACAINE HYDROCHLORIDE 5 MG/ML
INJECTION, SOLUTION EPIDURAL; INFILTRATION; PERINEURAL
Status: COMPLETED | OUTPATIENT
Start: 2020-09-02 | End: 2020-09-02

## 2020-09-02 RX ADMIN — PHENYLEPHRINE HYDROCHLORIDE 50 MCG: 10 INJECTION INTRAVENOUS at 13:56

## 2020-09-02 RX ADMIN — OXYCODONE HYDROCHLORIDE AND ACETAMINOPHEN 1 TABLET: 5; 325 TABLET ORAL at 20:54

## 2020-09-02 RX ADMIN — LORAZEPAM 1 MG: 2 INJECTION INTRAMUSCULAR; INTRAVENOUS at 09:16

## 2020-09-02 RX ADMIN — SODIUM CHLORIDE, SODIUM LACTATE, POTASSIUM CHLORIDE, CALCIUM CHLORIDE 1000 ML: 600; 310; 30; 20 INJECTION, SOLUTION INTRAVENOUS at 11:58

## 2020-09-02 RX ADMIN — ONDANSETRON 4 MG: 2 INJECTION INTRAMUSCULAR; INTRAVENOUS at 13:51

## 2020-09-02 RX ADMIN — METFORMIN HYDROCHLORIDE 1000 MG: 500 TABLET ORAL at 17:42

## 2020-09-02 RX ADMIN — MEROPENEM 1 G: 1 INJECTION, POWDER, FOR SOLUTION INTRAVENOUS at 15:42

## 2020-09-02 RX ADMIN — LIDOCAINE HYDROCHLORIDE 10 ML: 20 INJECTION, SOLUTION INFILTRATION; PERINEURAL at 12:36

## 2020-09-02 RX ADMIN — MEROPENEM 1 G: 1 INJECTION, POWDER, FOR SOLUTION INTRAVENOUS at 05:55

## 2020-09-02 RX ADMIN — SODIUM CHLORIDE, POTASSIUM CHLORIDE, SODIUM LACTATE AND CALCIUM CHLORIDE 1000 ML: 600; 310; 30; 20 INJECTION, SOLUTION INTRAVENOUS at 11:58

## 2020-09-02 RX ADMIN — PROPOFOL 20 MG: 10 INJECTION, EMULSION INTRAVENOUS at 13:09

## 2020-09-02 RX ADMIN — LIDOCAINE HYDROCHLORIDE 40 ML: 20 INJECTION, SOLUTION INFILTRATION; PERINEURAL at 13:09

## 2020-09-02 RX ADMIN — MIDAZOLAM HYDROCHLORIDE 2 MG: 2 INJECTION, SOLUTION INTRAMUSCULAR; INTRAVENOUS at 12:34

## 2020-09-02 RX ADMIN — INSULIN GLARGINE 14 UNITS: 100 INJECTION, SOLUTION SUBCUTANEOUS at 21:51

## 2020-09-02 RX ADMIN — MIDAZOLAM HYDROCHLORIDE 2 MG: 2 INJECTION, SOLUTION INTRAMUSCULAR; INTRAVENOUS at 13:03

## 2020-09-02 RX ADMIN — VANCOMYCIN HYDROCHLORIDE 1500 MG: 5 INJECTION, POWDER, LYOPHILIZED, FOR SOLUTION INTRAVENOUS at 13:18

## 2020-09-02 RX ADMIN — Medication 10 ML: at 20:54

## 2020-09-02 RX ADMIN — DIPHENHYDRAMINE HYDROCHLORIDE 25 MG: 50 INJECTION INTRAMUSCULAR; INTRAVENOUS at 13:15

## 2020-09-02 RX ADMIN — PROPOFOL 120 MCG/KG/MIN: 10 INJECTION, EMULSION INTRAVENOUS at 13:09

## 2020-09-02 RX ADMIN — MEROPENEM 1 G: 1 INJECTION, POWDER, FOR SOLUTION INTRAVENOUS at 23:38

## 2020-09-02 RX ADMIN — DIPHENHYDRAMINE HCL 25 MG: 25 TABLET ORAL at 01:34

## 2020-09-02 RX ADMIN — FENTANYL CITRATE 50 MCG: 50 INJECTION, SOLUTION INTRAMUSCULAR; INTRAVENOUS at 13:09

## 2020-09-02 RX ADMIN — ROPIVACAINE HYDROCHLORIDE 20 ML: 5 INJECTION, SOLUTION EPIDURAL; INFILTRATION; PERINEURAL at 12:36

## 2020-09-02 RX ADMIN — VANCOMYCIN HYDROCHLORIDE 1500 MG: 5 INJECTION, POWDER, LYOPHILIZED, FOR SOLUTION INTRAVENOUS at 01:33

## 2020-09-02 RX ADMIN — Medication 10 ML: at 20:55

## 2020-09-02 ASSESSMENT — PULMONARY FUNCTION TESTS
PIF_VALUE: 1
PIF_VALUE: 0
PIF_VALUE: 1

## 2020-09-02 ASSESSMENT — ENCOUNTER SYMPTOMS
WHEEZING: 0
VOMITING: 0
EYES NEGATIVE: 1
SHORTNESS OF BREATH: 0
ALLERGIC/IMMUNOLOGIC NEGATIVE: 1
NAUSEA: 0
GASTROINTESTINAL NEGATIVE: 1
RESPIRATORY NEGATIVE: 1
ABDOMINAL PAIN: 0

## 2020-09-02 ASSESSMENT — PAIN SCALES - GENERAL
PAINLEVEL_OUTOF10: 4
PAINLEVEL_OUTOF10: 2
PAINLEVEL_OUTOF10: 0

## 2020-09-02 NOTE — CONSULTS
Infectious Disease     Patient Name: Oneyda Bell  Date: 9/2/2020  YOB: 1960  Medical Record Number: [id number]      Osteomyelitis left foot        History of Present Illness:  Depression diabetes hypertension SVT      Patient presented 8/30/2020 with red swollen painful L foot  [place] with open foot [place]  Patient had been receiving oral antibiotics  White blood cell count was 20,000    Patient had been hospitalized in early August with diabetic foot infection initially treated with vancomycin meropenem  MRI not show evidence of osteomyelitis at that time  Patient was discharged on oral doxycycline  Patient never followed up with me as outpatient    Cultures from 8/30/2020 of the LEFT foot show streptococci coagulase-negative staphylococci anaerobic bacteria      Patient went to surgery  9/2/2020 incision and drainage left fifth metatarsal head with resection              Review of Systems   Constitutional: Negative. HENT: Negative. Eyes: Negative. Respiratory: Negative. Cardiovascular: Negative. Gastrointestinal: Negative. Endocrine: Negative. Musculoskeletal: Negative. Skin: Negative. Neurological: Negative. Hematological: Negative. Review of Systems: All 14 review of systems negative other than as stated above    Social History     Tobacco Use    Smoking status: Never Smoker    Smokeless tobacco: Never Used   Substance Use Topics    Alcohol use: Yes     Comment: socailly     Drug use: Never         Past Medical History:   Diagnosis Date    Depression     DM type 2 (diabetes mellitus, type 2) ([facility] [address]     Hyperlipidemia     Hypertension     SVT (supraventricular tachycardia) ([facility])            Past Surgical History:   Procedure Laterality Date    ABLATION OF DYSRHYTHMIC FOCUS      EYE SURGERY      FOOT SURGERY Right          No current facility-administered medications on file prior to encounter.       Current Outpatient Medications on File Prior to Encounter   Medication Sig Dispense Refill    insulin glargine (LANTUS) 100 UNIT/ML injection vial Inject 14 Units into the skin nightly      glipiZIDE (GLUCOTROL) 5 MG tablet TAKE 1 TABLET BY MOUTH ONCE DAILY BEFORE A MEAL      lisinopril (PRINIVIL;ZESTRIL) 20 MG tablet TAKE 1 TABLET BY MOUTH ONCE DAILY 30 tablet 3    metFORMIN (GLUCOPHAGE) 1000 MG tablet Take 2 tablets by mouth 2 times daily (with meals) (Patient taking differently: Take 1,000 mg by mouth 2 times daily (with meals) ) 60 tablet 3    mupirocin (BACTROBAN) 2 % ointment Apply 22 g topically 3 times daily Apply topically 3 times daily.  albuterol (PROVENTIL) (2.5 MG/3ML) 0.083% nebulizer solution Take 3 mLs by nebulization every 6 hours as needed for Wheezing or Shortness of Breath 120 each 3       Allergies   Allergen Reactions    Cat Hair Extract      itching, ankle and hands    Penicillins     Statins Other (See Comments)     Skin sloughing    Sulfa Antibiotics     Trimethoprim Itching and Other (See Comments)     Other reaction(s): Other: See Comments  Burning and redness in eye(eye drop)  Burning and redness in eye(eye drop)           Family History   Family history unknown: Yes         Physical Exam:      Physical Exam   Constitutional: She appears well-developed. HENT:   Head: Normocephalic. Eyes: Pupils are equal, round, and reactive to light. Neck: Normal range of motion. Neck supple. No JVD present. No tracheal deviation present. No thyromegaly present. Cardiovascular:   No murmur heard. Pulmonary/Chest: Breath sounds normal. No respiratory distress. She has no wheezes. She has no rales. She exhibits no tenderness. Abdominal: Soft. Bowel sounds are normal. She exhibits no distension and no mass. There is no abdominal tenderness. There is no rebound and no guarding. Musculoskeletal:         General: Tenderness and edema present.       Comments: Left foot wrapped Lymphadenopathy:     She has no cervical adenopathy. Neurological: She is alert. Skin: Skin is warm. She is not diaphoretic. There is erythema. Blood pressure (!) 149/64, pulse 63, temperature 99.1 °F (37.3 °C), temperature source Oral, resp. rate 18, height 5' 6\" (1.676 m), weight 250 lb (113.4 kg), SpO2 97 %. .   Lab Results   Component Value Date    WBC 9.2 09/02/2020    HGB 10.9 (L) 09/02/2020    HCT 33.3 (L) 09/02/2020    MCV 79.2 (L) 09/02/2020     (H) 09/02/2020     Lab Results   Component Value Date     08/30/2020    K 4.2 08/30/2020    K 4.1 08/04/2020    CL 98 08/30/2020    CO2 24 08/30/2020    BUN 16 08/30/2020    CREATININE 0.74 08/30/2020    GLUCOSE 268 08/30/2020    GLUCOSE 179 09/27/2019    CALCIUM 9.0 08/30/2020          Culture, Anaerobic and Aerobic [[id number]]  (Abnormal)  Collected: 08/30/20 1200    Order Status: Completed  Specimen: Foot  Updated: 09/02/20 0926     Gram Stain Result  Moderate WBC's   Moderate epithelial cells   Moderate Gram positive cocci in pairs, clusters   Abnormal       Organism  Streptococcus viridans groupAbnormal       WOUND/ABSCESS  --     Heavy growth   No further workup     Organism  Staphylococcus coagulase-negativeAbnormal       WOUND/ABSCESS  --     Light growth   No further workup     Organism  Bacteroides fragilis groupAbnormal       Anaerobic Culture  --     Light growth   Beta Lactamase POSITIVE. Sensitivities not routinely done. Drugs of choice are: Metronidazole,   Cefoxitin, or Piperacillin/Tazobactam.     Organism  Anaerobic gram positive cocciAbnormal       Anaerobic Culture  --     Heavy growth   Sensitivities not routinely done.  Drugs of choice are:   Penicillin G, Metronidazole, Clindamycin or   Piperacillin/Tazobactam.            ASSESSMENT:  Patient Active Problem List   Diagnosis    Cellulitis of foot    Open wound of left foot    DM type 2 (diabetes mellitus, type 2) ([facility] [address]    Hypertension    [facility]    Non-pressure chronic ulcer of other part of left foot with fat layer exposed [facility]    Cellulitis         PLAN:    Osteomyelitis left foot      Will need 6 weeks IV antibiotic therapy

## 2020-09-02 NOTE — ANESTHESIA PRE PROCEDURE
Department of Anesthesiology  Preprocedure Note       Name:  Katie Ling   Age:  56 y.o.  :  1960                                          MRN:  [id number]         Date:  2020      Surgeon: Marie Dutton):  Wes Fleming DPM    Procedure: Procedure(s):  INCISION AND DRAINAGE LEFT FIFTH METATARSAL HEAD RESECTION SAG SAW WITH 111 BLADE, MISONIX  ROOM 175    Medications prior to admission:   Prior to Admission medications    Medication Sig Start Date End Date Taking? Authorizing Provider   insulin glargine (LANTUS) 100 UNIT/ML injection vial Inject 14 Units into the skin nightly   Yes Historical Provider, MD   glipiZIDE (GLUCOTROL) 5 MG tablet TAKE 1 TABLET BY MOUTH ONCE DAILY BEFORE A MEAL 20  Yes Historical Provider, MD   lisinopril (PRINIVIL;ZESTRIL) 20 MG tablet TAKE 1 TABLET BY MOUTH ONCE DAILY 20  Yes Rukhsana Raman MD   metFORMIN (GLUCOPHAGE) 1000 MG tablet Take 2 tablets by mouth 2 times daily (with meals)  Patient taking differently: Take 1,000 mg by mouth 2 times daily (with meals)  20  Yes Rukhsana Raman MD   mupirocin (BACTROBAN) 2 % ointment Apply 22 g topically 3 times daily Apply topically 3 times daily.    Yes Historical Provider, MD   albuterol (PROVENTIL) (2.5 MG/3ML) 0.083% nebulizer solution Take 3 mLs by nebulization every 6 hours as needed for Wheezing or Shortness of Breath 8/11/20 9/10/20  Rukhsana Raman MD       Current medications:    Current Facility-Administered Medications   Medication Dose Route Frequency Provider Last Rate Last Dose    metFORMIN (GLUCOPHAGE) tablet 1,000 mg  1,000 mg Oral BID  Sydney Moctezuma MD        lactated ringers infusion   Intravenous Continuous Cole Jeffers  mL/hr at 20 1158 1,000 mL at 20 1158    sodium chloride flush 0.9 % injection 10 mL  10 mL Intravenous 2 times per day Lashanda Guerrero DPM   10 mL at 20 2143    sodium chloride flush 0.9 % injection 10 mL  10 mL Readings from Last 3 Encounters:   08/30/20 250 lb (113.4 kg)   08/11/20 250 lb (113.4 kg)   08/11/20 250 lb (113.4 kg)     Body mass index is 40.35 kg/m².     CBC:   Lab Results   Component Value Date    WBC 9.2 09/02/2020    RBC 4.21 09/02/2020    HGB 10.9 09/02/2020    HCT 33.3 09/02/2020    MCV 79.2 09/02/2020    RDW 15.5 09/02/2020     09/02/2020       CMP:   Lab Results   Component Value Date     08/30/2020    K 4.2 08/30/2020    K 4.1 08/04/2020    CL 98 08/30/2020    CO2 24 08/30/2020    BUN 16 08/30/2020    CREATININE 0.74 08/30/2020    GFRAA >60.0 08/30/2020    LABGLOM >60.0 08/30/2020    GLUCOSE 268 08/30/2020    GLUCOSE 179 09/27/2019    PROT 7.1 08/30/2020    CALCIUM 9.0 08/30/2020    BILITOT 0.4 08/30/2020    ALKPHOS 82 08/30/2020    AST 18 08/30/2020    ALT 23 08/30/2020       POC Tests:   Recent Labs     09/02/20  1042   POCGLU 184*       Coags: No results found for: PROTIME, INR, APTT    HCG (If Applicable): No results found for: PREGTESTUR, PREGSERUM, HCG, HCGQUANT     ABGs: No results found for: PHART, PO2ART, HLI1RDL, HTA1SLO, BEART, W4PURJSR     Type & Screen (If Applicable):  No results found for: LABABO, LABRH    Drug/Infectious Status (If Applicable):  No results found for: HIV, HEPCAB    COVID-19 Screening (If Applicable): No results found for: COVID19      Anesthesia Evaluation  Patient summary reviewed and Nursing notes reviewed no history of anesthetic complications:   Airway: Mallampati: II  TM distance: >3 FB   Neck ROM: full  Mouth opening: > = 3 FB Dental: normal exam         Pulmonary:Negative Pulmonary ROS and normal exam                               Cardiovascular:Negative CV ROS  Exercise tolerance: good (>4 METS),   (+) hypertension:,       ECG reviewed      Echocardiogram reviewed         Beta Blocker:  Not on Beta Blocker         Neuro/Psych:   Negative Neuro/Psych ROS              GI/Hepatic/Renal: Neg GI/Hepatic/Renal ROS            Endo/Other: Negative Endo/Other ROS   (+) DiabetesType II DM, , .          Pt had PAT visit. Abdominal:           Vascular: negative vascular ROS. Anesthesia Plan      MAC and regional     ASA 3       Induction: intravenous. MIPS: Prophylactic antiemetics administered. Anesthetic plan and risks discussed with patient. Plan discussed with CRNA.     Attending anesthesiologist reviewed and agrees with Pre Eval content              Tiesha Contreras MD   9/2/2020

## 2020-09-02 NOTE — PROGRESS NOTES
PODIATRIC PRE-OPERATIVE NOTE                                 SERVICE DATE: 9/2/2020    SERVICE TIME:  9:39 AM    DIAGNOSIS: Infected diabetic foot wound to the left 5th metatarsal with suspected osteomyelitis    PROCEDURE(S): Debridement of all non-viable soft tissue and bone with 5th metatarsal head resection, left foot    Consent on chart: Yes    LABS:  CBC:  Lab Results   Component Value Date    WBC 9.2 09/02/2020    HGB 10.9 (L) 09/02/2020    HCT 33.3 (L) 09/02/2020    MCV 79.2 (L) 09/02/2020     (H) 09/02/2020       CMP:  Lab Results   Component Value Date     08/30/2020    K 4.2 08/30/2020    K 4.1 08/04/2020    CL 98 08/30/2020    CO2 24 08/30/2020    BUN 16 08/30/2020    CREATININE 0.74 08/30/2020    GLUCOSE 268 08/30/2020    GLUCOSE 179 09/27/2019    CALCIUM 9.0 08/30/2020       COAGS:  Lab Results   Component Value Date    LABALBU 3.7 08/30/2020       URINALYSIS:  No components found for: UKET, NITRITES, SPGR, UPROT, LEUKEST, UWBC, UBACTERIA     Type & screen:  No    Medical Clearance:  Yes    CXR/EKG:  Yes    Medications/Preop Antibiotics: Antibiotics on the floor     Allergies   Allergen Reactions    Cat Hair Extract      itching, ankle and hands    Penicillins     Statins Other (See Comments)     Skin sloughing    Sulfa Antibiotics     Trimethoprim Itching and Other (See Comments)     Other reaction(s): Other: See Comments  Burning and redness in eye(eye drop)  Burning and redness in eye(eye drop)         Surgical site identified:  Yes    NPO:  Yes    IV Fluids:  Yes     Risks and benefits, complications, treatment options, expected outcome and rehabilitation explained,  patient understands. All questions were entertained and answered. Patient wishes to proceed with above procedure(s).     SIGNATURE: Lashanda Guerrero PATIENT NAME: Naga Gamboa   DATE: September 2, 2020 MRN: [id number]   TIME: 9:39 AM PAGER: [phone]

## 2020-09-02 NOTE — BRIEF OP NOTE
Brief Postoperative Note      Patient: Arias Doherty  YOB: 1960  MRN: [id number]    Date of Procedure: 9/2/2020    Pre-Op Diagnosis: Infected diabetic foot wound to the left 5th metatarsal with suspected osteomyelitis    Post-Op Diagnosis: Same       Procedure(s):  INCISION AND DRAINAGE LEFT FIFTH METATARSAL HEAD RESECTION    Surgeon(s):  Wes Fleming DPM    Assistant:  Resident: Jonn Bolton DPM    Anesthesia: Choice    Estimated Blood Loss (mL): less than 50     Complications: None    Specimens:   ID Type Source Tests Collected by Time Destination   1 : left foot (pre) Swab Foot CULTURE, SURGICAL Wes Fleming DPM 9/2/2020 1325    2 : left foot (post) Swab Foot CULTURE, SURGICAL Wes Fleming DPM 9/2/2020 1334    A : DEBRIDEMENT TISSUE FIFTH METATARSAL HEAD Tissue Foot SURGICAL PATHOLOGY Wes Fleming DPM 9/2/2020 1405        Implants:  * No implants in log *      Drains: * No LDAs found *    Findings: See operative report    Electronically signed by Jonn Bolton DPM on 9/2/2020 at 2:16 PM

## 2020-09-02 NOTE — ANESTHESIA PROCEDURE NOTES
Peripheral Block    Patient location during procedure: pre-op  Start time: 9/2/2020 12:35 PM  End time: 9/2/2020 12:45 PM  Staffing  Anesthesiologist: Duke Pierre MD  Performed: anesthesiologist   Preanesthetic Checklist  Completed: patient identified, site marked, surgical consent, pre-op evaluation, timeout performed, IV checked, risks and benefits discussed, monitors and equipment checked, anesthesia consent given, oxygen available and patient being monitored  Peripheral Block  Patient position: supine  Prep: ChloraPrep  Patient monitoring: cardiac monitor, continuous pulse ox, frequent blood pressure checks and IV access  Block type: Sciatic  Laterality: left  Injection technique: single-shot  Procedures: ultrasound guided and nerve stimulator  Local infiltration: ropivacaine  Infiltration strength: 0.5 %  Dose: 30 mL  Popliteal  Provider prep: mask and sterile gloves (Sterile probe cover)  Local infiltration: ropivacaine  Needle  Needle type: combined needle/nerve stimulator   Needle gauge: 22 G  Needle length: 10 cm  Needle localization: anatomical landmarks and ultrasound guidance  Assessment  Injection assessment: negative aspiration for heme, no paresthesia on injection and local visualized surrounding nerve on ultrasound  Paresthesia pain: immediately resolved  Slow fractionated injection: yes  Hemodynamics: stable  Additional Notes  Ultrasound image printed and saved in patient chart.     Sterile probe cover used    Medications Administered  Ropivacaine (NAROPIN) injection 0.5%, 20 mL  lidocaine injection 2%, 10 mL  Reason for block: post-op pain management and at surgeon's request

## 2020-09-02 NOTE — ACP (ADVANCE CARE PLANNING)
Advance Care Planning     Advance Care Planning Activator (Inpatient)  Conversation Note      Date of ACP Conversation: 8/30/2020    Conversation Conducted with: Patient with Decision Making Capacity    ACP Activator: Juan David GARLAND Matheus Pineda makes decisions on behalf of the incapacitated patient: Decision Maker is asked to consider and make decisions based on patient values, known preferences, or best interests. Health Care Decision Maker:     Current Designated Health Care Decision Maker:   Primary Decision Maker: Bere Laurent - [phone]  (If there is a valid Health Care Decision Maker named in the [facility] Makers\" box in the ACP activity, but it is not visible above, be sure to open that field and then select the health care decision maker relationship (ie \"primary\") in the blank space to the right of the name.) Validate  this information as still accurate & up-to-date; edit [place] field as needed.)    Note: Assess and validate information in current ACP documents, as indicated. If no Decision Maker listed above or available through scanned documents, then:    If no Authorized Decision Maker has previously been identified, then patient chooses [place]:  \"Who would you like to name as your primary health care decision-maker? \"               Name:         Relationship:           Phone number:   \"Can this person be reached easily? \"   \"Who would you like to name as your back-up decision maker? \"   Name: Jonelle Mcclain        Relationship: Daughter          Phone number: not sure  Peggi Sleight this person be reached easily? \" Yes    Note: If the relationship of these Decision-Makers to the patient does NOT follow your state's Next of Kin hierarchy, recommend that patient complete ACP document that meets state-specific requirements to allow them to act on the patient's behalf when appropriate. Care Preferences    Ventilation:   \"If you were in your present state of health and suddenly became very ill and were unable to breathe on your own, what would your preference be about the use of a ventilator (breathing machine) if it were available to you? \"      Would the patient desire the use of ventilator (breathing machine)?: yes    \"If your health worsens and it becomes clear that your chance of recovery is unlikely, what would your preference be about the use of a ventilator (breathing machine) if it were available to you? \"     Would the patient desire the use of ventilator (breathing machine)?: Yes      Resuscitation  \"CPR works best to restart the heart when there is a sudden event, like a heart attack, in someone who is otherwise healthy. Unfortunately, CPR does not typically restart the heart for people who have serious health conditions or who are very sick. \"    \"In the event your heart stopped as a result of an underlying serious health condition, would you want attempts to be made to restart your heart (answer \"yes\" for attempt to resuscitate) or would you prefer a natural death (answer \"no\" for do not attempt to resuscitate)? \" yes      NOTE: If the patient has a valid advance directive AND now provides care preference(s) that are inconsistent with that prior directive, advise the patient to consider either: creating a new advance directive that complies with state-specific requirements; or, if that is not possible, orally revoking that prior directive in accordance with state-specific requirements, which must be documented in the EHR. [x] Yes   [] No   Educated Patient / Marvene Deal regarding differences between Advance Directives and portable DNR orders.     Length of ACP Conversation in minutes:      Conversation Outcomes:  [x] ACP discussion completed  [x] Existing advance directive reviewed with patient; no changes to patient's previously recorded wishes  [] New Advance Directive completed  [] Portable Do Not Rescitate prepared for Provider review and signature  [] POLST/POST/MOLST/MOST prepared for Provider review and signature      Follow-up plan:    [] Schedule follow-up conversation to continue planning  [] Referred individual to Provider for additional questions/concerns   [] Advised patient/agent/surrogate to review completed ACP document and update if needed with changes in condition, patient preferences or care setting    [] This note routed to one or more involved healthcare providers

## 2020-09-02 NOTE — PROGRESS NOTES
Hospitalist Progress Note      Date of Admission: 8/30/2020  Chief Complaint:    Chief Complaint   Patient presents with    Foot Pain     left foot; red, swollen, painful     Subjective:  No new complaints. No nausea, vomiting, chest pain, or headache      Medications:    Infusion Medications   Scheduled Medications    sodium chloride flush  10 mL Intravenous 2 times per day    sodium chloride flush  10 mL Intravenous 2 times per day    enoxaparin  40 mg Subcutaneous Daily    glipiZIDE  5 mg Oral QAM AC    insulin glargine  14 Units Subcutaneous Nightly    lisinopril  20 mg Oral Daily    metFORMIN  2,000 mg Oral BID WC    vancomycin  1,500 mg Intravenous Q12H    vancomycin (VANCOCIN) intermittent dosing (placeholder)   Other RX Placeholder    meropenem  1 g Intravenous Q8H     PRN Meds: sodium chloride flush, diphenhydrAMINE, sodium chloride flush, acetaminophen **OR** acetaminophen, polyethylene glycol, promethazine **OR** ondansetron, oxyCODONE-acetaminophen    Intake/Output Summary (Last 24 hours) at 9/1/2020 2045  Last data filed at 9/1/2020 1705  Gross per 24 hour   Intake 1080 ml   Output --   Net 1080 ml     Exam:  /69   Pulse 68   Temp 97.6 °F (36.4 °C) (Oral)   Resp 16   Ht 5' 6\" (1.676 m)   Wt 250 lb (113.4 kg)   SpO2 95%   BMI 40.35 kg/m²   Head: Normocephalic, atraumatic  Sclera clear  Neck supple, nontender  Lungs: clear    Labs:   Recent Labs     08/30/20  1015 08/31/20  0550 09/01/20  0600   WBC 20.4* 11.5* 9.8   HGB 11.5* 10.6* 10.9*   HCT 34.8* 32.8* 33.8*   * 363 376     Recent Labs     08/30/20  1015      K 4.2   CL 98   CO2 24   BUN 16   CREATININE 0.74   CALCIUM 9.0   AST 18   ALT 23   BILITOT 0.4   ALKPHOS 82     No results for input(s): INR in the last 72 hours. No results for input(s): Annamarie Sox in the last 72 hours. Radiology:  US DUP LOWER EXTREMITY LEFT PRAMOD   Final Result      NO LEFT LOWER EXTREMITY DVT IDENTIFIED.             XR FOOT LEFT (MIN 3 VIEWS)   Final Result      WORSENING LATERAL FOREFOOT SOFT TISSUE SWELLING. NO RADIOGRAPHIC EVIDENCE OF OSTEOMYELITIS, FRACTURE, OR OTHER SIGNIFICANT CHANGES FROM RECENT PRIOR STUDIES IDENTIFIED. Assessment/Plan:    1. Foot wound with cellulitis and drainage. IV antibiotics, for surgery on Wednesday. 2. Cellulitis likely with underlying osteomyelitis. Consult ID  3. Diabetes: Questionable compliance. Requested diabetic diet be removed while in the hospital.  4. Hypertension: Continue medications, monitor blood pressure  5.  DVT ppx    Electronically signed by Johny Lombard, MD on 9/1/2020 at 8:45 PM

## 2020-09-02 NOTE — PLAN OF CARE
Post OP Instructions    POD #0 S/p debridement of all non-viable soft tissue and bone with 5th metatarsal head resection left foot.     Do not change left foot post op dressing. Reinforce if needed. Podiatry will change first post operative dressing.     Will be non weight bearing to LLE today but may start heel weight bearing tomorrow to LLE in post operative shoe only     Antibiotics per ID    Pain management per primary team     Will follow intraoperative cultures    Patient has elevated uric acid levels with early signs of gout in the left ankle.  Will defer to primary team to treat at this time.     Will follow     Jeffry Barron DPM  PGY-2  Podiatric Surgery Resident  Podiatry On Call Pager: [phone]  09/02/20  2:21 PM

## 2020-09-02 NOTE — CONSULTS
Chief Complaint   Patient presents with    Foot Pain     left foot; red, swollen, painful        Patient is a 56 y.o. female who presents with a chief complaint of CP. Patient is followed on a regular basis by Dr. Nubia Arguelles MD.  Patient presents with left fifth toe cellulitis/wound. Patient is to undergo surgery/amputation with podiatry. We are asked for preop clearance. She denies any history of coronary disease, congestive heart failure. Patient with history of SVT status post ablation at [facility] in 2014. She denies any history of stress test or cardiac catheterization. She denies any chest pain, chest pressure heaviness. She denies any lower extremity edema or syncope. She admits to history of diabetes, hypertension, hyperlipidemia and remote tobacco abuse. She is independent of her ADLs. Patient weighs 250 pounds. No EKG present on the chart. That is post negative lower extremity venous duplex ultrasound. She denies any history of PAD work-up.       Past Medical History:   Diagnosis Date    Depression     DM type 2 (diabetes mellitus[facility] type 2) ([facility])     Hyperlipidemia     Hypertension     SVT (supraventricular tachycardia) ([facility])       Patient Active Problem List   Diagnosis    Cellulitis of foot    Open wound of left foot    DM type 2 (diabetes mellitus, type 2) ([address]    Hypertension    YESENIA (acute kidney injury) ([address]    Non-pressure chronic ulcer of other part of left foot with fat layer exposed ([address]    Cellulitis       Past Surgical History:   Procedure Laterality Date    ABLATION OF DYSRHYTHMIC FOCUS      EYE SURGERY      FOOT SURGERY Right        Social History     Socioeconomic History    Marital status:      Spouse name: None    Number of children: None    Years of education: None    Highest education level: None   Occupational History    None   Social Needs    Financial resource strain: None    Food insecurity     Worry: None     Inability: None    Transportation needs     Medical: None     Non-medical: None   Tobacco Use    Smoking status: Never Smoker    Smokeless tobacco: Never Used   Substance and Sexual Activity    Alcohol use: Yes     Comment: socailly     Drug use: Never    Sexual activity: None   Lifestyle    Physical activity     Days per week: None     Minutes per session: None    Stress: None   Relationships    Social connections     Talks on phone: None     Gets together: None     Attends Quaker service: None     Active member of club or organization: None     Attends meetings of clubs or organizations: None     Relationship status: None    Intimate partner violence     Fear of current or ex partner: None     Emotionally abused: None     Physically abused: None     Forced sexual activity: None   Other Topics Concern    None   Social History Narrative    None       Family History   Family history unknown: Yes       Current Facility-Administered Medications   Medication Dose Route Frequency Provider Last Rate Last Dose    metFORMIN (GLUCOPHAGE) tablet 1,000 mg  1,000 mg Oral BID  Marcelo Griffin MD        sodium chloride flush 0.9 % injection 10 mL  10 mL Intravenous 2 times per day Lashanda Guerrero DPM   10 mL at 09/01/20 2143    sodium chloride flush 0.9 % injection 10 mL  10 mL Intravenous PRN Lashanda Guerrero DPM        diphenhydrAMINE (BENADRYL) tablet 25 mg  25 mg Oral Q6H PRN Marcelo Griffin MD   25 mg at 09/02/20 0134    sodium chloride flush 0.9 % injection 10 mL  10 mL Intravenous 2 times per day Marcelo Griffin MD   10 mL at 09/01/20 2142    sodium chloride flush 0.9 % injection 10 mL  10 mL Intravenous PRN Marcelo Griffin MD        acetaminophen (TYLENOL) tablet 650 mg  650 mg Oral Q6H PRN Marcelo Griffin MD   650 mg at 08/30/20 1505    Or    acetaminophen (TYLENOL) suppository 650 mg  650 mg Rectal Q6H PRN Marcelo Griffin MD        polyethylene glycol (GLYCOLAX) packet 17 g  17 g Oral Daily PRN Marcelo Griffin MD  promethazine (PHENERGAN) tablet 12.5 mg  12.5 mg Oral Q6H PRN Daisy Lofton MD        Or    ondansetron [facility]) injection 4 mg  4 mg Intravenous Q6H PRN Daisy Lofton MD        enoxaparin (LOVENOX) injection 40 mg  40 mg Subcutaneous Daily Daisy Lofton MD   Stopped at 08/30/20 1506    glipiZIDE (GLUCOTROL) tablet 5 mg  5 mg Oral QAM AC Daisy Lofton MD   5 mg at 09/01/20 1544    insulin glargine (LANTUS) injection vial 14 Units  14 Units Subcutaneous Nightly Daisy Lofton MD   14 Units at 09/01/20 2143    lisinopril (PRINIVIL;ZESTRIL) tablet 20 mg  20 mg Oral Daily Daisy Lofton MD   Stopped at 09/02/20 0908    vancomycin (VANCOCIN) 1,500 mg in dextrose 5 % 500 mL IVPB  1,500 mg Intravenous Q12H Daisy Lofton MD   Stopped at 09/02/20 0309    vancomycin (VANCOCIN) intermittent dosing (placeholder)   Other RX Debbe Cushing, MD        oxyCODONE-acetaminophen (PERCOCET) 5-325 MG per tablet 1 tablet  1 tablet Oral Q6H PRN Daisy Lofton MD   1 tablet at 09/01/20 2326    meropenem (MERREM) 1 g in sodium chloride 0.9 % 100 mL IVPB (mini-bag)  1 g Intravenous Q8H Daisy Lofton MD   Stopped at 09/02/20 0352       ALLERGIES: Cat hair extract; Penicillins; Statins; Sulfa antibiotics; and Trimethoprim    Review of Systems   Constitutional: Negative. Negative for chills and fever. HENT: Negative. Eyes: Negative. Respiratory: Negative for shortness of breath and wheezing. Cardiovascular: Negative for chest pain, palpitations and leg swelling. Gastrointestinal: Negative. Negative for abdominal pain, nausea and vomiting. Endocrine: Negative. Genitourinary: Negative. Musculoskeletal: Negative. Skin: Negative. Negative for rash. Allergic/Immunologic: Negative. Neurological: Negative for dizziness, weakness and headaches. Hematological: Negative. Psychiatric/Behavioral: Negative.           VITALS:  Blood pressure (!) 156/54, pulse 63, temperature 98.2 °F (36.8 °C), temperature source Oral, resp. rate 18, height 5' 6\" (1.676 m), weight 250 lb (113.4 kg), SpO2 95 %. Body mass index is 40.35 kg/m². Physical Exam    LABS:  Recent Results (from the past 24 hour(s))   POCT Glucose    Collection Time: 09/01/20 10:59 AM   Result Value Ref Range    POC Glucose 187 (H) 60 - 115 mg/dl    Performed on ACCU-CHEK    Uric Acid    Collection Time: 09/01/20  1:29 PM   Result Value Ref Range    Uric Acid, Serum 6.4 (H) 2.4 - 5.7 mg/dL   POCT Glucose    Collection Time: 09/01/20  4:19 PM   Result Value Ref Range    POC Glucose 211 (H) 60 - 115 mg/dl    Performed on ACCU-CHEK    POCT Glucose    Collection Time: 09/01/20  7:26 PM   Result Value Ref Range    POC Glucose 152 (H) 60 - 115 mg/dl    Performed on ACCU-CHEK    CBC auto differential    Collection Time: 09/02/20  6:06 AM   Result Value Ref Range    WBC 9.2 4.8 - 10.8 K/uL    RBC 4.21 4.20 - 5.40 M/uL    Hemoglobin 10.9 (L) 12.0 - 16.0 g/dL    Hematocrit 33.3 (L) 37.0 - 47.0 %    MCV 79.2 (L) 82.0 - 100.0 fL    MCH 26.0 (L) 27.0 - 31.3 pg    MCHC 32.8 (L) 33.0 - 37.0 %    RDW 15.5 (H) 11.5 - 14.5 %    Platelets 758 (H) 672 - 400 K/uL    Neutrophils % 64.0 %    Lymphocytes % 19.0 %    Monocytes % 10.9 %    Eosinophils % 5.5 %    Basophils % 0.6 %    Neutrophils Absolute 5.9 1.4 - 6.5 K/uL    Lymphocytes Absolute 1.7 1.0 - 4.8 K/uL    Monocytes Absolute 1.0 (H) 0.2 - 0.8 K/uL    Eosinophils Absolute 0.5 0.0 - 0.7 K/uL    Basophils Absolute 0.1 0.0 - 0.2 K/uL   POCT Glucose    Collection Time: 09/02/20  6:13 AM   Result Value Ref Range    POC Glucose 179 (H) 60 - 115 mg/dl    Performed on ACCU-CHEK      Troponin: No results found for: TROPONINI    EKG:      ASSESSMENT:    Preoperative clearance  Left foot wound/cellulitis  History of uncontrolled diabetes  Essential hypertension  Hyperlipidemia  History of tobacco abuse  Morbid obesity  History of SVT status post ablation in 2014 at [facility]   1.  As always, aggressive risk factor modification is strongly recommended. We should adhere to the JNC VIII guidelines for HTN management and the NCEPATP III guidelines for LDL-C management. 2. Check 2D Echo for LV function, PA pressures, wall motion abnormalities and any significant valvular disease. 3. Patient is an intermediate risk patient for the proposed procedure/surgery if EF is normal. . No active cardiac conditions such as ischemia, CHF or arrhythmias  4. Max cardiac meds  5. Check b/l LE art duplex US  6. GI/DVT proph  7. Monitor on tele  8. Consider ischemic workup as outpatient in future given risk factors. She is asymptomatic now  9.  Check EKG    Electronically signed by Teressa Arndt DO on 9/2/2020 at 9:54 AM

## 2020-09-02 NOTE — PROGRESS NOTES
Pt returned to floor, post op. Pt was assisted to bed by transport while a beside commode was procured for pt. Pt was assisted to commode via stand and pivot. Upon finishing evacuation of bladder, pt was assisted to bed via stand pivot. Pt assessed as stated, call light placed with in reach and bed side table placed with in reach. Pt is normally ambulatory to restroom, but advised to utilize call light for assistance due to weakness in leg operated on.

## 2020-09-02 NOTE — PROGRESS NOTES
Hospitalist Progress Note      Date of Admission: 8/30/2020  Chief Complaint:    Chief Complaint   Patient presents with    Foot Pain     left foot; red, swollen, painful     Subjective:  No new complaints. No nausea, vomiting, chest pain, or headache      Medications:    Infusion Medications   Scheduled Medications    LORazepam  1 mg Intravenous Once    metFORMIN  1,000 mg Oral BID WC    sodium chloride flush  10 mL Intravenous 2 times per day    sodium chloride flush  10 mL Intravenous 2 times per day    enoxaparin  40 mg Subcutaneous Daily    glipiZIDE  5 mg Oral QAM AC    insulin glargine  14 Units Subcutaneous Nightly    lisinopril  20 mg Oral Daily    vancomycin  1,500 mg Intravenous Q12H    vancomycin (VANCOCIN) intermittent dosing (placeholder)   Other RX Placeholder    meropenem  1 g Intravenous Q8H     PRN Meds: sodium chloride flush, diphenhydrAMINE, sodium chloride flush, acetaminophen **OR** acetaminophen, polyethylene glycol, promethazine **OR** ondansetron, oxyCODONE-acetaminophen    Intake/Output Summary (Last 24 hours) at 9/2/2020 0917  Last data filed at 9/1/2020 1705  Gross per 24 hour   Intake 1080 ml   Output --   Net 1080 ml     Exam:  BP (!) 156/54   Pulse 63   Temp 98.2 °F (36.8 °C) (Oral)   Resp 18   Ht 5' 6\" (1.676 m)   Wt 250 lb (113.4 kg)   SpO2 95%   BMI 40.35 kg/m²   Head: Normocephalic, atraumatic  Sclera clear  Neck supple, nontender  Lungs: clear    Labs:   Recent Labs     08/31/20  0550 09/01/20  0600 09/02/20  0606   WBC 11.5* 9.8 9.2   HGB 10.6* 10.9* 10.9*   HCT 32.8* 33.8* 33.3*    376 447*     Recent Labs     08/30/20  1015      K 4.2   CL 98   CO2 24   BUN 16   CREATININE 0.74   CALCIUM 9.0   AST 18   ALT 23   BILITOT 0.4   ALKPHOS 82     No results for input(s): INR in the last 72 hours. No results for input(s): Laverda Dancer in the last 72 hours.   Radiology:  RADIOLOGY REPORT   Final Result      US DUP LOWER EXTREMITY LEFT PRAMOD   Final Result      NO LEFT LOWER EXTREMITY DVT IDENTIFIED. XR FOOT LEFT (MIN 3 VIEWS)   Final Result      WORSENING LATERAL FOREFOOT SOFT TISSUE SWELLING. NO RADIOGRAPHIC EVIDENCE OF OSTEOMYELITIS, FRACTURE, OR OTHER SIGNIFICANT CHANGES FROM RECENT PRIOR STUDIES IDENTIFIED. Assessment/Plan:    1. Foot wound with cellulitis and drainage. IV antibiotics, for surgery today  2. Cellulitis likely with underlying osteomyelitis. Consult ID  3. Diabetes: Questionable compliance. Requested diabetic diet be removed while in the hospital.  4. Hypertension: Continue medications, monitor blood pressure  5.  DVT ppx    35 minutes total care time, >1/2 in unit/floor time and care coordination     Electronically signed by Lilly Patino MD on 9/2/2020 at 9:17 AM

## 2020-09-02 NOTE — FLOWSHEET NOTE
Crystal Rodriguez 281 care of pt from [facility].    2143 PM assessment and medications completed. Pt denies further needs at this time. 200 Pt complained of burning at her IV site. No evidence of infiltrate or phlebitis. New IV placed in right wrist.    2326 Pt complaining of 6/10 pain in her left foot. Administered 1 tablet of percocet. Pt denies further needs at this time.

## 2020-09-02 NOTE — ANESTHESIA POSTPROCEDURE EVALUATION
Department of Anesthesiology  Postprocedure Note    Patient: Roxi Fenton  MRN: [id number]  YOB: 1960  Date of evaluation: 9/2/2020  Time:  2:28 PM     Procedure Summary     Date:  09/02/20 Room / Location:  [address] [place] [address]    Anesthesia Start:  1303 Anesthesia Stop:      Procedure:  INCISION AND DRAINAGE LEFT FIFTH METATARSAL HEAD RESECTION (Left ) Diagnosis:  (INPATIENT)    Surgeon:  Swapna Pettit DPM Responsible Provider:  DIMA Hilliard CRNA    Anesthesia Type:  MAC, regional ASA Status:  3          Anesthesia Type: MAC, regional    Nomi Phase I: Nomi Score: 7    Nomi Phase II:      Last vitals: Reviewed and per EMR flowsheets.        Anesthesia Post Evaluation    Patient location during evaluation: PACU  Patient participation: complete - patient participated  Level of consciousness: awake and awake and alert  Pain score: 0  Airway patency: patent  Nausea & Vomiting: no nausea and no vomiting  Complications: no  Cardiovascular status: blood pressure returned to baseline and hemodynamically stable  Respiratory status: acceptable, spontaneous ventilation and face mask  Hydration status: euvolemic

## 2020-09-03 ENCOUNTER — APPOINTMENT (OUTPATIENT)
Dept: INTERVENTIONAL RADIOLOGY/VASCULAR | Age: 60
DRG: 629 | End: 2020-09-03
Payer: COMMERCIAL

## 2020-09-03 LAB
BASOPHILS ABSOLUTE: 0 K/UL (ref 0–0.2)
BASOPHILS RELATIVE PERCENT: 0.5 %
EOSINOPHILS ABSOLUTE: 0.4 K/UL (ref 0–0.7)
EOSINOPHILS RELATIVE PERCENT: 4.4 %
GLUCOSE BLD-MCNC: 127 MG/DL (ref 60–115)
GLUCOSE BLD-MCNC: 149 MG/DL (ref 60–115)
GLUCOSE BLD-MCNC: 169 MG/DL (ref 60–115)
GLUCOSE BLD-MCNC: 231 MG/DL (ref 60–115)
HCT VFR BLD CALC: 34.9 % (ref 37–47)
HEMOGLOBIN: 11.3 G/DL (ref 12–16)
LYMPHOCYTES ABSOLUTE: 1.4 K/UL (ref 1–4.8)
LYMPHOCYTES RELATIVE PERCENT: 15.1 %
MCH RBC QN AUTO: 26 PG (ref 27–31.3)
MCHC RBC AUTO-ENTMCNC: 32.5 % (ref 33–37)
MCV RBC AUTO: 79.9 FL (ref 82–100)
MONOCYTES ABSOLUTE: 1.3 K/UL (ref 0.2–0.8)
MONOCYTES RELATIVE PERCENT: 14 %
NEUTROPHILS ABSOLUTE: 6.1 K/UL (ref 1.4–6.5)
NEUTROPHILS RELATIVE PERCENT: 66 %
PDW BLD-RTO: 15.9 % (ref 11.5–14.5)
PERFORMED ON: ABNORMAL
PLATELET # BLD: 444 K/UL (ref 130–400)
RBC # BLD: 4.37 M/UL (ref 4.2–5.4)
WBC # BLD: 9.3 K/UL (ref 4.8–10.8)

## 2020-09-03 PROCEDURE — 36573 INSJ PICC RS&I 5 YR+: CPT | Performed by: RADIOLOGY

## 2020-09-03 PROCEDURE — 6370000000 HC RX 637 (ALT 250 FOR IP): Performed by: STUDENT IN AN ORGANIZED HEALTH CARE EDUCATION/TRAINING PROGRAM

## 2020-09-03 PROCEDURE — APPNB30 APP NON BILLABLE TIME 0-30 MINS: Performed by: PHYSICIAN ASSISTANT

## 2020-09-03 PROCEDURE — 85025 COMPLETE CBC W/AUTO DIFF WBC: CPT

## 2020-09-03 PROCEDURE — 2709999900 IR PICC WO SQ PORT/PUMP > 5 YEARS

## 2020-09-03 PROCEDURE — 2580000003 HC RX 258: Performed by: STUDENT IN AN ORGANIZED HEALTH CARE EDUCATION/TRAINING PROGRAM

## 2020-09-03 PROCEDURE — 2500000003 HC RX 250 WO HCPCS: Performed by: INTERNAL MEDICINE

## 2020-09-03 PROCEDURE — 6360000002 HC RX W HCPCS: Performed by: STUDENT IN AN ORGANIZED HEALTH CARE EDUCATION/TRAINING PROGRAM

## 2020-09-03 PROCEDURE — 02HV33Z INSERTION OF INFUSION DEVICE INTO SUPERIOR VENA CAVA, PERCUTANEOUS APPROACH: ICD-10-PCS | Performed by: INTERNAL MEDICINE

## 2020-09-03 PROCEDURE — 2060000000 HC ICU INTERMEDIATE R&B

## 2020-09-03 PROCEDURE — 99232 SBSQ HOSP IP/OBS MODERATE 35: CPT | Performed by: INTERNAL MEDICINE

## 2020-09-03 PROCEDURE — 2580000003 HC RX 258: Performed by: INTERNAL MEDICINE

## 2020-09-03 PROCEDURE — 36415 COLL VENOUS BLD VENIPUNCTURE: CPT

## 2020-09-03 RX ORDER — DEXTROSE MONOHYDRATE 50 MG/ML
100 INJECTION, SOLUTION INTRAVENOUS PRN
Status: DISCONTINUED | OUTPATIENT
Start: 2020-09-03 | End: 2020-09-04 | Stop reason: HOSPADM

## 2020-09-03 RX ORDER — LIDOCAINE HYDROCHLORIDE 20 MG/ML
5 INJECTION, SOLUTION INFILTRATION; PERINEURAL ONCE
Status: COMPLETED | OUTPATIENT
Start: 2020-09-03 | End: 2020-09-03

## 2020-09-03 RX ORDER — SODIUM CHLORIDE 0.9 % (FLUSH) 0.9 %
10 SYRINGE (ML) INJECTION EVERY 12 HOURS SCHEDULED
Status: DISCONTINUED | OUTPATIENT
Start: 2020-09-03 | End: 2020-09-04 | Stop reason: HOSPADM

## 2020-09-03 RX ORDER — SODIUM CHLORIDE 0.9 % (FLUSH) 0.9 %
10 SYRINGE (ML) INJECTION PRN
Status: DISCONTINUED | OUTPATIENT
Start: 2020-09-03 | End: 2020-09-04 | Stop reason: HOSPADM

## 2020-09-03 RX ORDER — SODIUM CHLORIDE 9 MG/ML
250 INJECTION, SOLUTION INTRAVENOUS ONCE
Status: COMPLETED | OUTPATIENT
Start: 2020-09-03 | End: 2020-09-03

## 2020-09-03 RX ORDER — DEXTROSE MONOHYDRATE 25 G/50ML
12.5 INJECTION, SOLUTION INTRAVENOUS PRN
Status: DISCONTINUED | OUTPATIENT
Start: 2020-09-03 | End: 2020-09-04 | Stop reason: HOSPADM

## 2020-09-03 RX ORDER — NICOTINE POLACRILEX 4 MG
15 LOZENGE BUCCAL PRN
Status: DISCONTINUED | OUTPATIENT
Start: 2020-09-03 | End: 2020-09-04 | Stop reason: HOSPADM

## 2020-09-03 RX ADMIN — SODIUM CHLORIDE 250 ML: 9 INJECTION, SOLUTION INTRAVENOUS at 13:49

## 2020-09-03 RX ADMIN — GLIPIZIDE 5 MG: 5 TABLET ORAL at 06:31

## 2020-09-03 RX ADMIN — LIDOCAINE HYDROCHLORIDE 5 ML: 20 INJECTION, SOLUTION INFILTRATION; PERINEURAL at 13:50

## 2020-09-03 RX ADMIN — METFORMIN HYDROCHLORIDE 1000 MG: 500 TABLET ORAL at 17:44

## 2020-09-03 RX ADMIN — INSULIN GLARGINE 14 UNITS: 100 INJECTION, SOLUTION SUBCUTANEOUS at 21:17

## 2020-09-03 RX ADMIN — OXYCODONE HYDROCHLORIDE AND ACETAMINOPHEN 1 TABLET: 5; 325 TABLET ORAL at 08:58

## 2020-09-03 RX ADMIN — METFORMIN HYDROCHLORIDE 1000 MG: 500 TABLET ORAL at 08:58

## 2020-09-03 RX ADMIN — MEROPENEM 1 G: 1 INJECTION, POWDER, FOR SOLUTION INTRAVENOUS at 17:44

## 2020-09-03 RX ADMIN — DIPHENHYDRAMINE HCL 25 MG: 25 TABLET ORAL at 00:34

## 2020-09-03 RX ADMIN — MEROPENEM 1 G: 1 INJECTION, POWDER, FOR SOLUTION INTRAVENOUS at 08:56

## 2020-09-03 RX ADMIN — DIPHENHYDRAMINE HCL 25 MG: 25 TABLET ORAL at 15:21

## 2020-09-03 RX ADMIN — LISINOPRIL 20 MG: 20 TABLET ORAL at 08:58

## 2020-09-03 RX ADMIN — Medication 10 ML: at 21:19

## 2020-09-03 RX ADMIN — VANCOMYCIN HYDROCHLORIDE 1500 MG: 5 INJECTION, POWDER, LYOPHILIZED, FOR SOLUTION INTRAVENOUS at 01:01

## 2020-09-03 RX ADMIN — VANCOMYCIN HYDROCHLORIDE 1500 MG: 5 INJECTION, POWDER, LYOPHILIZED, FOR SOLUTION INTRAVENOUS at 15:21

## 2020-09-03 RX ADMIN — SODIUM CHLORIDE, PRESERVATIVE FREE 10 ML: 5 INJECTION INTRAVENOUS at 21:18

## 2020-09-03 RX ADMIN — OXYCODONE HYDROCHLORIDE AND ACETAMINOPHEN 1 TABLET: 5; 325 TABLET ORAL at 15:21

## 2020-09-03 ASSESSMENT — ENCOUNTER SYMPTOMS
NAUSEA: 0
RESPIRATORY NEGATIVE: 1
COLOR CHANGE: 0
ABDOMINAL PAIN: 0
SHORTNESS OF BREATH: 0
VOMITING: 0
GASTROINTESTINAL NEGATIVE: 1
CHEST TIGHTNESS: 0

## 2020-09-03 ASSESSMENT — PAIN SCALES - GENERAL
PAINLEVEL_OUTOF10: 5
PAINLEVEL_OUTOF10: 5
PAINLEVEL_OUTOF10: 0

## 2020-09-03 NOTE — PROGRESS NOTES
Hospitalist Progress Note      Date of Admission: 8/30/2020  Chief Complaint:    Chief Complaint   Patient presents with    Foot Pain     left foot; red, swollen, painful     Subjective:  No new complaints. No nausea, vomiting, chest pain, or headache      Medications:    Infusion Medications   Scheduled Medications    metFORMIN  1,000 mg Oral BID WC    sodium chloride flush  10 mL Intravenous 2 times per day    sodium chloride flush  10 mL Intravenous 2 times per day    enoxaparin  40 mg Subcutaneous Daily    glipiZIDE  5 mg Oral QAM AC    insulin glargine  14 Units Subcutaneous Nightly    lisinopril  20 mg Oral Daily    vancomycin  1,500 mg Intravenous Q12H    vancomycin (VANCOCIN) intermittent dosing (placeholder)   Other RX Placeholder    meropenem  1 g Intravenous Q8H     PRN Meds: sodium chloride flush, diphenhydrAMINE, sodium chloride flush, acetaminophen **OR** acetaminophen, polyethylene glycol, promethazine **OR** ondansetron, oxyCODONE-acetaminophen    Intake/Output Summary (Last 24 hours) at 9/3/2020 1135  Last data filed at 9/3/2020 0354  Gross per 24 hour   Intake 1500 ml   Output 350 ml   Net 1150 ml     Exam:  BP (!) 160/46   Pulse 66   Temp 98.5 °F (36.9 °C) (Oral)   Resp 18   Ht 5' 6\" (1.676 m)   Wt 250 lb 8 oz (113.6 kg)   SpO2 93%   BMI 40.43 kg/m²   Head: Normocephalic, atraumatic  Sclera clear  Neck supple, nontender  Lungs: clear    Labs:   Recent Labs     09/01/20  0600 09/02/20  0606 09/03/20  0616   WBC 9.8 9.2 9.3   HGB 10.9* 10.9* 11.3*   HCT 33.8* 33.3* 34.9*    447* 444*     No results for input(s): NA, K, CL, CO2, BUN, CREATININE, CALCIUM, PHOS, AST, ALT, BILIDIR, BILITOT, ALKPHOS in the last 72 hours. Invalid input(s): MAGNES  No results for input(s): INR in the last 72 hours. No results for input(s): Laverda Dancer in the last 72 hours.   Radiology:  RADIOLOGY REPORT   Final Result      US DUP LOWER EXTREMITY LEFT PRAMOD   Final Result      NO LEFT LOWER

## 2020-09-03 NOTE — PROGRESS NOTES
PODIATRIC MEDICINE INPATIENT PROGRESS NOTE    Patient Name: Sid April  MRN: [id number]    FOLLOW UP REGARDING:  Left foot wound    ASSESSMENT:   56 y.o. female with PMH below for who podiatry was consulted for left foot wound. Patient is known to podiatry and was last seen a few weeks ago in the wound care center. She presented with abscess in the left foot which is a recurrent issue and we will pan for left 5th met head resection on Wednesday after a few days of IV ABX. PLAN AND RECOMMENDATIONS:  Surgical site dressing is clean, dry, and intact at this time. No evidence of strike-through. Dressing changed today via podiatry consisting of betadine directly to the wound site, 4x4 guaze, Kerlix wrap followed by ACE bandage to secure. Will keep this dressing clean, dry, and intact until follow up appointment. Non- WB to left foot in post operative shoe. Please use forefoot offloading shoe. WB as tolerated to the right. Patient should elevate BLE at or above heart level while resting  Antibiotic coverage per primary team or ID recommendations   Pain management per primary team   Podiatry to follow, will likely be okay for discharge tomorrow AM.  Patient will need follow up within within 7-10 days of discharge     Patient to be discussed with staff, Dr. Shaan Chanel, who will provide final recommendations going forward. INTERVAL HISTORY  NAEON. VSS. Afebrile  No nausea, vomiting, fever, chills, SOB, or CP  Minimal discomfort to the left foot but otherwise feeling well. Pain controlled. PICC line placed    OBJECTIVE:  BP (!) 160/46   Pulse 66   Temp 98.5 °F (36.9 °C) (Oral)   Resp 18   Ht 5' 6\" (1.676 m)   Wt 250 lb 8 oz (113.6 kg)   SpO2 93%   BMI 40.43 kg/m²      Patient is alert and oriented x 3 in NAD. Neurovascular status unchanged. Dressing clean, dry, and intact. No evidence of strike-through.    Upon removal of the surgical site dressing, the incision site appears well coapted with all sutures intact. No evidence of dehiscence. There is periwound erythema which remains from previous inspection however, this appears to be improving substantially. There is mild bleeding to the site. No pus or foul odor. Satisfactory post operative evaluation. See images in Media tab      LABORATORY:  Recent Labs     20  0600 20  0606 20  0616   WBC 9.8 9.2 9.3   HGB 10.9* 10.9* 11.3*   HCT 33.8* 33.3* 34.9*    447* 444*     No results for input(s): NA, K, CL, CO2, BUN, CREATININE, GLUF, CALCIUM, MG, PHOS, PROT in the last 72 hours. Invalid input(s): TBILI, DBILI, GLU  Lab Results   Component Value Date    LABALBU 3.7 2020     Lab Results   Component Value Date    SEDRATE 31 (H) 2020     No results found for: CRP  Lab Results   Component Value Date    LABA1C 9.3 (H) 2020     No results found for: EAG    MICROBIOLOGY:  Post lavage culture: NGTD    IMAGIN/30: XR left foot    FINDINGS:         Lateral forefoot swelling appears worsened when compared to the prior studies.         There is no soft tissue emphysema, fracture, progressive bone destruction, or other significant changes identified.         Degenerative changes predominantly of the talocuneiform, tarsometatarsal, and ankle joints are again noted.             Patient's case discussed with staff and agrees with final recommendations outlined above.       [place], PGY-2  Please first page Podiatry On Call, [phone]  September 3, 2020  12:05 PM

## 2020-09-03 NOTE — FLOWSHEET NOTE
Pt medicated per orders with antibiotics. Pt was non weight baring on her left foot though out shift. Pt's dressing clean, dry and intact. Pt's call light within reach.

## 2020-09-03 NOTE — PROGRESS NOTES
Spiritual Care Services     Summary of Visit:  \"This too shall pass,\" PT said amidst tears. She has a procedure done for her diabetic ulcer. She had one done last three years. She is hoping that this will also heal. I encouraged and comforted her. We prayed for her healing. I anointed her. Spiritual Assessment/Intervention/Outcomes:    Encounter Summary  Services provided to[de-identified] Patient  Referral/Consult From[de-identified] Rounding  Support System: Spouse, Children, Parent  Place of [facility]: [facility]: No  Continue Visiting: Yes  Complexity of Encounter: Moderate  Length of Encounter: 30 minutes  Spiritual Assessment Completed: Yes  Advance Care Planning: Yes(Patient says she has documents at [facility])  Routine  Type:  Follow up  Assessment: Calm, Approachable, Coping  Intervention: Sustaining presence/ Ministry of presence  Outcome: Receptive     Spiritual/Christian  Type: Spiritual support  Assessment: Approachable, Coping, Hopeful, Concerns with suffering  Intervention: Prayer, Nurtured hope, Active listening, Explored feelings, thoughts, concerns, Explored coping resources, Anointing  Outcome: Less anxious, less agitated, Tearful, Expressed gratitude, Expressed feelings of perry, peace, and/or awe, Comfort  Sacraments  Sacrament of Sick-Anointing: Anointed     Advance Directives (For Healthcare)  Pre-existing DNR Comfort Care/DNR Arrest/DNI Order: No  Healthcare Directive: Yes, patient has an advance directive for healthcare treatment  Type of Healthcare Directive: Durable power of  for health care, Living will  Copy in Chart: No, copy requested from clinic  Information on Healthcare Directives Requested: Yes  Patient Requests Assistance: Yes, referral made to   Advance Directives: Documents given  Healthcare Agent Appointed: [address] Agent's Name: [address] [facility] Agent's Phone Number: [phone]  If you are unable to speak for yourself, does your Healthcare

## 2020-09-03 NOTE — OP NOTE
preoperative holding area to the operative suite and placed on the table in a supine position. All monitors were applied. Time out was performed. Induction of monitored anesthesia was performed. Patient received a popliteal block prior to surgery start, no local was administered. No pre-operative antibiotic prophylaxis was given, as patient received IV antibiotics on the floor. The left foot and ankle were then prepped and draped in the normal sterile fashion. Next,  Attention was then directed to the left 5th metatarsal.  Using a #15 blade, a full thickness elliptical incision was made on the lateral aspect of the 5th metatarsal and encompassing the left plantar wound. The incision was then deepened through the subcutaneous tissue to the level of bone. All bleeders were cauterized along the way. Next, the deep fascial and capsular layer of the 5th metatarsal was incised and the medial collateral ligaments were resected giving full access to the 5th metatarsal head. Next, using a sagittal saw with a #111 blade, the head of the 5th metatarsal was resected and passed from the operating field and placed on the back table. Upon grasping the resected metatarsal head to remove it from the field, it was easily punctured and crumbled in the operative site. This was clearly necrotic bone where the integrity of the cortex and medullary canal was compromised. The remaining necrotic tissue was then removed using sharp dissection until good bleeding healthy tissue was noted. The wound and wound edges were copiously irrigated with misonix. After irrigation, there was no additional purulent drainage noted, and the remaining 5th  metatarsal shaft appeared free of infection. A post lavage culture of the bone was then resected with a rongeur and sent for micro. All bleeders were cauterized. The incision was then closed with 3-0 vicryl for deep closure as well as 3-0 prolene for skin closure using simple interrupted technique.

## 2020-09-03 NOTE — PROGRESS NOTES
4.2 08/30/2020    K 4.1 08/04/2020    CL 98 08/30/2020    CO2 24 08/30/2020    BUN 16 08/30/2020    CREATININE 0.74 08/30/2020    GLUCOSE 268 08/30/2020    GLUCOSE 179 09/27/2019    CALCIUM 9.0 08/30/2020          Culture, Anaerobic and Aerobic [[id number]]  (Abnormal)  Collected: 08/30/20 1200    Order Status: Completed  Specimen: Foot  Updated: 09/02/20 0926     Gram Stain Result  Moderate WBC's   Moderate epithelial cells   Moderate Gram positive cocci in pairs, clusters   Abnormal       Organism  Streptococcus viridans groupAbnormal       WOUND/ABSCESS  --     Heavy growth   No further workup     Organism  Staphylococcus coagulase-negativeAbnormal       WOUND/ABSCESS  --     Light growth   No further workup     Organism  Bacteroides fragilis groupAbnormal       Anaerobic Culture  --     Light growth   Beta Lactamase POSITIVE. Sensitivities not routinely done. Drugs of choice are: Metronidazole,   Cefoxitin, or Piperacillin/Tazobactam.     Organism  Anaerobic gram positive cocciAbnormal       Anaerobic Culture  --     Heavy growth   Sensitivities not routinely done. Drugs of choice are:   Penicillin G, Metronidazole, Clindamycin or   Piperacillin/Tazobactam.        Culture, Surgical [[id number]]   Collected: 09/02/20 1334    Order Status: Completed  Specimen: Swab from Foot  Updated: 09/03/20 1054     Gram Stain Result  No WBC's, No organisms seen     Anaerobic Culture  Culture in progress     Culture Surgical  No growth 24 hours    Narrative:      ORDER#: [id number]                          ORDERED BY: Jodee Bedoya   SOURCE: Foot Left Foot (Post)              COLLECTED:  09/02/20 13:34   ANTIBIOTICS AT [facility]. :                      RECEIVED :  09/02/20 13:52    Culture, Surgical [[id number]]   Collected: 09/02/20 1325    Order Status: Completed  Specimen: Swab from Foot  Updated: 09/03/20 1053     Gram Stain Result  No WBC's, No organisms seen     Anaerobic Culture  Culture in progress     Culture Surgical

## 2020-09-03 NOTE — PROGRESS NOTES
Progress Note  Patient: Naga Gamboa  Unit/Bed: M060/P021-51  YOB: 1960  MRN: [id number]  Acct: [de-identified]   Admitting Diagnosis: Cellulitis [L03.90]  Date:  8/30/2020  Hospital Day: 4    Chief Complaint:  Foot pain    Subjective      9/3/20: Sitting up in bed in no acute distress. Denies any chest pain or shortness of breath complaints. Underwent I&D of left fifth metatarsal yesterday. Remains on IV antibiotics per infectious disease. Hemodynamically stable. Bilateral lower extremity arterial duplex ultrasound completed yesterday and result is pending. ID, podiatry and internal medicine following. Tentative plan for discharge home tomorrow. Patient status post SVT ablation in 2014 at [facility] but has not followed with cardiology recently. She is not on any rate control medications. 9/2/20:  Patient is a 56 y.o. female who presents with a chief complaint of CP. Patient is followed on a regular basis by Dr. Monica Lynch MD.  Patient presents with left fifth toe cellulitis/wound. Patient is to undergo surgery/amputation with podiatry. We are asked for preop clearance. She denies any history of coronary disease, congestive heart failure. Patient with history of SVT status post ablation at [facility] in 2014. She denies any history of stress test or cardiac catheterization. She denies any chest pain, chest pressure heaviness. She denies any lower extremity edema or syncope. She admits to history of diabetes, hypertension, hyperlipidemia and remote tobacco abuse. She is independent of her ADLs. Patient weighs 250 pounds. No EKG present on the chart. That is post negative lower extremity venous duplex ultrasound. She denies any history of PAD work-up. Review of Systems:   Review of Systems   Constitutional: Negative for activity change and fever. HENT: Negative for congestion. Respiratory: Negative for chest tightness and shortness of breath. Cardiovascular: Negative for chest pain, palpitations and leg swelling. Gastrointestinal: Negative for abdominal pain, nausea and vomiting. Genitourinary: Negative for difficulty urinating. Musculoskeletal: Negative for arthralgias. Skin: Negative for color change, pallor and rash. Neurological: Negative for dizziness, syncope and light-headedness. Psychiatric/Behavioral: Negative for agitation. Physical Examination:    BP (!) 155/81   Pulse 75   Temp 97.8 °F (36.6 °C) (Oral)   Resp 16   Ht 5' 6\" (1.676 m)   Wt 250 lb 8 oz (113.6 kg)   SpO2 96%   BMI 40.43 kg/m²    Physical Exam  Constitutional:       General: She is not in acute distress. Appearance: Normal appearance. She is obese. HENT:      Head: Normocephalic and atraumatic. Cardiovascular:      Rate and Rhythm: Normal rate and regular rhythm. Pulmonary:      Effort: Pulmonary effort is normal. No respiratory distress. Breath sounds: No wheezing, rhonchi or rales. Abdominal:      Palpations: Abdomen is soft. Comments: Obese abdomen   Musculoskeletal: Normal range of motion. Right lower leg: No edema. Left lower leg: Edema (trace) present. Comments: Left foot dressing intact   Skin:     General: Skin is warm and dry. Neurological:      General: No focal deficit present. Mental Status: She is alert and oriented to person, place, and time. Cranial Nerves: No cranial nerve deficit.    Psychiatric:         Mood and Affect: Mood normal.         Behavior: Behavior normal.         LABS:  CBC:   Lab Results   Component Value Date    WBC 9.3 09/03/2020    RBC 4.37 09/03/2020    HGB 11.3 09/03/2020    HCT 34.9 09/03/2020    MCV 79.9 09/03/2020    MCH 26.0 09/03/2020    MCHC 32.5 09/03/2020    RDW 15.9 09/03/2020     09/03/2020     CBC with Differential:   Lab Results   Component Value Date    WBC 9.3 09/03/2020    RBC 4.37 09/03/2020    HGB 11.3 09/03/2020    HCT 34.9 09/03/2020     09/03/2020    MCV 79.9 09/03/2020    MCH 26.0 09/03/2020    MCHC 32.5 09/03/2020    RDW 15.9 09/03/2020    LYMPHOPCT 15.1 09/03/2020    MONOPCT 14.0 09/03/2020    BASOPCT 0.5 09/03/2020    MONOSABS 1.3 09/03/2020    LYMPHSABS 1.4 09/03/2020    EOSABS 0.4 09/03/2020    BASOSABS 0.0 09/03/2020     CMP:    Lab Results   Component Value Date     08/30/2020    K 4.2 08/30/2020    K 4.1 08/04/2020    CL 98 08/30/2020    CO2 24 08/30/2020    BUN 16 08/30/2020    CREATININE 0.74 08/30/2020    GFRAA >60.0 08/30/2020    LABGLOM >60.0 08/30/2020    GLUCOSE 268 08/30/2020    GLUCOSE 179 09/27/2019    PROT 7.1 08/30/2020    LABALBU 3.7 08/30/2020    CALCIUM 9.0 08/30/2020    BILITOT 0.4 08/30/2020    ALKPHOS 82 08/30/2020    AST 18 08/30/2020    ALT 23 08/30/2020     BMP:    Lab Results   Component Value Date     08/30/2020    K 4.2 08/30/2020    K 4.1 08/04/2020    CL 98 08/30/2020    CO2 24 08/30/2020    BUN 16 08/30/2020    LABALBU 3.7 08/30/2020    CREATININE 0.74 08/30/2020    CALCIUM 9.0 08/30/2020    GFRAA >60.0 08/30/2020    LABGLOM >60.0 08/30/2020    GLUCOSE 268 08/30/2020    GLUCOSE 179 09/27/2019     Magnesium:  No results found for: MG  Troponin:  No results found for: [address]    Radiology:  No results found. Echocardiogram 9/2/20:  Conclusions   Summary   Normal left ventricular systolic function, no regional wall motion   abnormalities, estimated ejection fraction of 55%. Normal left ventricular size and function. Normal left ventricular wall thickness. Normal diastolic filling pattern for age. Trace (+) mitral regurgitation is present. No evidence of mitral valve stenosis. No evidence of aortic valve regurgitation . No evidence of aortic valve stenosis.    Signature   ----------------------------------------------------------------   Electronically signed by Barron Vázquez DO(Interpreting   physician) on 09/02/2020 05:53 PM      EKG 9/2/20: SR 63, Q wave lead III, no acute bruits  Abdomen: soft, non-tender, non-distended, normal bowel sounds, no masses or organomegaly    Active Hospital Problems    Diagnosis Date Noted    Pyogenic inflammation of bone ([facility] [address] [M86.9]      Priority: Low    Cellulitis [L03.90] 08/30/2020     Priority: Low        I reviewed and agree with the findings and plan documented in her note . EKG normal sinus rhythm. No ischemia. ASSESSMENT:     Preoperative clearance  Left foot wound/cellulitis  History of uncontrolled diabetes  Essential hypertension  Hyperlipidemia  History of tobacco abuse  Morbid obesity  History of SVT status post ablation in 2014 at [facility]  Normal LVF        PLAN:   1. As always, aggressive risk factor modification is strongly recommended. We should adhere to the JNC VIII guidelines for HTN management and the NCEPATP III guidelines for LDL-C management. 2. Await b/l LE art duplex US  3. GI/DVT proph  4. Monitor on tele  5. Consider ischemic workup as outpatient in future given risk factors.  She is asymptomatic now         Electronically signed by Mandie Rachel DO on 9/3/20 at 4:38 PM EDT

## 2020-09-04 VITALS
BODY MASS INDEX: 40.32 KG/M2 | HEIGHT: 66 IN | DIASTOLIC BLOOD PRESSURE: 56 MMHG | WEIGHT: 250.9 LBS | TEMPERATURE: 98.4 F | RESPIRATION RATE: 18 BRPM | SYSTOLIC BLOOD PRESSURE: 147 MMHG | OXYGEN SATURATION: 95 % | HEART RATE: 71 BPM

## 2020-09-04 LAB
BLOOD CULTURE, ROUTINE: NORMAL
CULTURE, BLOOD 2: NORMAL
GLUCOSE BLD-MCNC: 162 MG/DL (ref 60–115)
GLUCOSE BLD-MCNC: 86 MG/DL (ref 60–115)
PERFORMED ON: ABNORMAL
PERFORMED ON: NORMAL
VANCOMYCIN TROUGH: 23.4 UG/ML (ref 10–20)
VANCOMYCIN TROUGH: 39.5 UG/ML (ref 10–20)

## 2020-09-04 PROCEDURE — 6360000002 HC RX W HCPCS: Performed by: INTERNAL MEDICINE

## 2020-09-04 PROCEDURE — 6360000002 HC RX W HCPCS: Performed by: STUDENT IN AN ORGANIZED HEALTH CARE EDUCATION/TRAINING PROGRAM

## 2020-09-04 PROCEDURE — 6370000000 HC RX 637 (ALT 250 FOR IP): Performed by: STUDENT IN AN ORGANIZED HEALTH CARE EDUCATION/TRAINING PROGRAM

## 2020-09-04 PROCEDURE — 93010 ELECTROCARDIOGRAM REPORT: CPT | Performed by: INTERNAL MEDICINE

## 2020-09-04 PROCEDURE — 80202 ASSAY OF VANCOMYCIN: CPT

## 2020-09-04 PROCEDURE — 2580000003 HC RX 258: Performed by: INTERNAL MEDICINE

## 2020-09-04 PROCEDURE — 36415 COLL VENOUS BLD VENIPUNCTURE: CPT

## 2020-09-04 PROCEDURE — 99232 SBSQ HOSP IP/OBS MODERATE 35: CPT | Performed by: INTERNAL MEDICINE

## 2020-09-04 PROCEDURE — 2580000003 HC RX 258: Performed by: STUDENT IN AN ORGANIZED HEALTH CARE EDUCATION/TRAINING PROGRAM

## 2020-09-04 PROCEDURE — APPNB30 APP NON BILLABLE TIME 0-30 MINS: Performed by: PHYSICIAN ASSISTANT

## 2020-09-04 RX ADMIN — ERTAPENEM SODIUM 1000 MG: 1 INJECTION, POWDER, LYOPHILIZED, FOR SOLUTION INTRAMUSCULAR; INTRAVENOUS at 16:53

## 2020-09-04 RX ADMIN — ACETAMINOPHEN 650 MG: 325 TABLET, FILM COATED ORAL at 08:13

## 2020-09-04 RX ADMIN — MEROPENEM 1 G: 1 INJECTION, POWDER, FOR SOLUTION INTRAVENOUS at 08:13

## 2020-09-04 RX ADMIN — OXYCODONE HYDROCHLORIDE AND ACETAMINOPHEN 1 TABLET: 5; 325 TABLET ORAL at 04:10

## 2020-09-04 RX ADMIN — METFORMIN HYDROCHLORIDE 1000 MG: 500 TABLET ORAL at 08:11

## 2020-09-04 RX ADMIN — MEROPENEM 1 G: 1 INJECTION, POWDER, FOR SOLUTION INTRAVENOUS at 16:00

## 2020-09-04 RX ADMIN — GLIPIZIDE 5 MG: 5 TABLET ORAL at 06:32

## 2020-09-04 RX ADMIN — Medication 10 ML: at 08:12

## 2020-09-04 RX ADMIN — LISINOPRIL 20 MG: 20 TABLET ORAL at 08:11

## 2020-09-04 RX ADMIN — METFORMIN HYDROCHLORIDE 1000 MG: 500 TABLET ORAL at 16:30

## 2020-09-04 RX ADMIN — SODIUM CHLORIDE, PRESERVATIVE FREE 10 ML: 5 INJECTION INTRAVENOUS at 08:11

## 2020-09-04 RX ADMIN — VANCOMYCIN HYDROCHLORIDE 1500 MG: 5 INJECTION, POWDER, LYOPHILIZED, FOR SOLUTION INTRAVENOUS at 14:55

## 2020-09-04 RX ADMIN — MEROPENEM 1 G: 1 INJECTION, POWDER, FOR SOLUTION INTRAVENOUS at 02:18

## 2020-09-04 RX ADMIN — ACETAMINOPHEN 650 MG: 325 TABLET, FILM COATED ORAL at 16:35

## 2020-09-04 RX ADMIN — VANCOMYCIN HYDROCHLORIDE 1500 MG: 5 INJECTION, POWDER, LYOPHILIZED, FOR SOLUTION INTRAVENOUS at 03:02

## 2020-09-04 ASSESSMENT — PAIN SCALES - GENERAL
PAINLEVEL_OUTOF10: 4
PAINLEVEL_OUTOF10: 7
PAINLEVEL_OUTOF10: 4
PAINLEVEL_OUTOF10: 5

## 2020-09-04 ASSESSMENT — PAIN DESCRIPTION - PAIN TYPE
TYPE: ACUTE PAIN;CHRONIC PAIN
TYPE: ACUTE PAIN;CHRONIC PAIN
TYPE: CHRONIC PAIN;ACUTE PAIN

## 2020-09-04 ASSESSMENT — ENCOUNTER SYMPTOMS
COLOR CHANGE: 0
CHEST TIGHTNESS: 0
SHORTNESS OF BREATH: 0
ABDOMINAL PAIN: 0
NAUSEA: 0
RESPIRATORY NEGATIVE: 1
VOMITING: 0
GASTROINTESTINAL NEGATIVE: 1

## 2020-09-04 ASSESSMENT — PAIN DESCRIPTION - DESCRIPTORS
DESCRIPTORS: DISCOMFORT;DULL
DESCRIPTORS: DULL;DISCOMFORT
DESCRIPTORS: DULL;DISCOMFORT

## 2020-09-04 ASSESSMENT — PAIN DESCRIPTION - ORIENTATION: ORIENTATION: LEFT

## 2020-09-04 ASSESSMENT — PAIN DESCRIPTION - FREQUENCY
FREQUENCY: INTERMITTENT
FREQUENCY: INTERMITTENT

## 2020-09-04 ASSESSMENT — PAIN DESCRIPTION - LOCATION
LOCATION: GENERALIZED
LOCATION: GENERALIZED;FOOT
LOCATION: GENERALIZED

## 2020-09-04 NOTE — PROGRESS NOTES
Progress Note  Patient: Elver Caldwell  Unit/Bed: D782/D934-77  YOB: 1960  MRN: [id number]  Acct: [de-identified]   Admitting Diagnosis: Cellulitis [L03.90]  Date:  8/30/2020  Hospital Day: 5    Chief Complaint:  Foot pain/left foot osteomyelitis    Subjective    9/4/20: Resting comfortably in bed in no acute distress. Denies chest pain, shortness of breath or palpitations. Hemodynamically stable. Blood pressure mildly elevated. On lisinopril 20 mg p.o. daily. She remains on IV antibiotics with Merrem and vancomycin per infectious disease. Await result of bilateral lower extremity arterial duplex ultrasound. Stable from cardiac perspective. 9/3/20: Sitting up in bed in no acute distress. Denies any chest pain or shortness of breath complaints. Underwent I&D of left fifth metatarsal yesterday. Remains on IV antibiotics per infectious disease. Hemodynamically stable. Bilateral lower extremity arterial duplex ultrasound completed yesterday and result is pending. ID, podiatry and internal medicine following. Tentative plan for discharge home tomorrow. Patient status post SVT ablation in 2014 at [facility] but has not followed with cardiology recently. She is not on any rate control medications. 9/2/20:  Patient is a 56 y.o. female who presents with a chief complaint of CP. Patient is followed on a regular basis by Dr. Cindi Foss MD.  Patient presents with left fifth toe cellulitis/wound. Patient is to undergo surgery/amputation with podiatry. We are asked for preop clearance. She denies any history of coronary disease, congestive heart failure. Patient with history of SVT status post ablation at [facility] in 2014. She denies any history of stress test or cardiac catheterization. She denies any chest pain, chest pressure heaviness. She denies any lower extremity edema or syncope.   She admits to history of diabetes, hypertension, hyperlipidemia and remote tobacco abuse. She is independent of her ADLs. Patient weighs 250 pounds. No EKG present on the chart. That is post negative lower extremity venous duplex ultrasound. She denies any history of PAD work-up. Review of Systems:   Review of Systems   Constitutional: Negative for activity change and fever. HENT: Negative for congestion. Respiratory: Negative for chest tightness and shortness of breath. Cardiovascular: Negative for chest pain, palpitations and leg swelling. Gastrointestinal: Negative for abdominal pain, nausea and vomiting. Genitourinary: Negative for difficulty urinating. Musculoskeletal: Negative for arthralgias. Skin: Negative for color change, pallor and rash. Neurological: Negative for dizziness, syncope and light-headedness. Psychiatric/Behavioral: Negative for agitation. Physical Examination:    BP (!) 158/65   Pulse 63   Temp 97.9 °F (36.6 °C) (Oral)   Resp 18   Ht 5' 6\" (1.676 m)   Wt 250 lb 14.4 oz (113.8 kg)   SpO2 95%   BMI 40.50 kg/m²    Physical Exam  Constitutional:       General: She is not in acute distress. Appearance: Normal appearance. She is obese. HENT:      Head: Normocephalic and atraumatic. Cardiovascular:      Rate and Rhythm: Normal rate and regular rhythm. Pulmonary:      Effort: Pulmonary effort is normal. No respiratory distress. Breath sounds: No wheezing, rhonchi or rales. Abdominal:      Palpations: Abdomen is soft. Comments: Obese abdomen   Musculoskeletal: Normal range of motion. Right lower leg: Edema (trace) present. Left lower leg: Edema (trace) present. Comments: Left foot dressing intact   Skin:     General: Skin is warm and dry. Neurological:      General: No focal deficit present. Mental Status: She is alert and oriented to person, place, and time. Cranial Nerves: No cranial nerve deficit.    Psychiatric:         Mood and Affect: Mood normal.         Behavior: Behavior normal.         LABS:  CBC:   Lab Results   Component Value Date    WBC 9.3 09/03/2020    RBC 4.37 09/03/2020    HGB 11.3 09/03/2020    HCT 34.9 09/03/2020    MCV 79.9 09/03/2020    MCH 26.0 09/03/2020    MCHC 32.5 09/03/2020    RDW 15.9 09/03/2020     09/03/2020     CBC with Differential:   Lab Results   Component Value Date    WBC 9.3 09/03/2020    RBC 4.37 09/03/2020    HGB 11.3 09/03/2020    HCT 34.9 09/03/2020     09/03/2020    MCV 79.9 09/03/2020    MCH 26.0 09/03/2020    MCHC 32.5 09/03/2020    RDW 15.9 09/03/2020    LYMPHOPCT 15.1 09/03/2020    MONOPCT 14.0 09/03/2020    BASOPCT 0.5 09/03/2020    MONOSABS 1.3 09/03/2020    LYMPHSABS 1.4 09/03/2020    EOSABS 0.4 09/03/2020    BASOSABS 0.0 09/03/2020     CMP:    Lab Results   Component Value Date     08/30/2020    K 4.2 08/30/2020    K 4.1 08/04/2020    CL 98 08/30/2020    CO2 24 08/30/2020    BUN 16 08/30/2020    CREATININE 0.74 08/30/2020    GFRAA >60.0 08/30/2020    LABGLOM >60.0 08/30/2020    GLUCOSE 268 08/30/2020    GLUCOSE 179 09/27/2019    PROT 7.1 08/30/2020    LABALBU 3.7 08/30/2020    CALCIUM 9.0 08/30/2020    BILITOT 0.4 08/30/2020    ALKPHOS 82 08/30/2020    AST 18 08/30/2020    ALT 23 08/30/2020     BMP:    Lab Results   Component Value Date     08/30/2020    K 4.2 08/30/2020    K 4.1 08/04/2020    CL 98 08/30/2020    CO2 24 08/30/2020    BUN 16 08/30/2020    LABALBU 3.7 08/30/2020    CREATININE 0.74 08/30/2020    CALCIUM 9.0 08/30/2020    GFRAA >60.0 08/30/2020    LABGLOM >60.0 08/30/2020    GLUCOSE 268 08/30/2020    GLUCOSE 179 09/27/2019     Magnesium:  No results found for: MG  Troponin:  No results found for: [address]    Radiology:  No results found. Echocardiogram 9/2/20:  Conclusions   Summary   Normal left ventricular systolic function, no regional wall motion   abnormalities, estimated ejection fraction of 55%. Normal left ventricular size and function. Normal left ventricular wall thickness.    Normal diastolic filling pattern for age. Trace (+) mitral regurgitation is present. No evidence of mitral valve stenosis. No evidence of aortic valve regurgitation . No evidence of aortic valve stenosis. Signature   ----------------------------------------------------------------   Electronically signed by Bharath Camacho DO(Interpreting   physician) on 09/02/2020 05:53 PM      EKG 9/2/20: SR 63, Q wave lead III, no acute ischemic changes, QTc 411ms      Assessment:    Active Hospital Problems    Diagnosis Date Noted    Pre-operative clearance [Z01.818]      Priority: High    Pyogenic inflammation of bone ([address] [M86.9]     Cellulitis [L03.90] 08/30/2020     1. Pre-op eval/clearance  2. Left foot wound/cellulitis  3. Hx SVT s/p ablation in 2014 at [facility]  4. Uncontrolled DM  5. Dyslipidemia  6. Hx tobacco abuse  7. Morbid obesity  8. Normal LVF EF 55% per echo 9/2/20  9. HTN     Plan:  1. Continue current medications--lisinopril 20mg PO daily, IV abx, insulin, glipizide 5mg daily, DVT prophylaxis with lovenox 40mg SQ daily   2. IV antibiotics per ID--will need 6 weeks IV antibiotic therapy  3. Cardiac/diabetic diet recommended  4. Weight loss recommended  5. Maintain potassium greater than 4, magnesium greater than 2  6. GI/DVT prophylaxis  7. Podiatry recommendations-patient status post IND of left fifth metatarsal on 9/2/2020  8. Internal medicine recommendations  9. Consider coronary evaluation in future as outpatient given multiple risk factors for CAD. No present anginal complaints. Recommend outpatient follow-up with Dr. Skyler Caldwell in 3 weeks upon discharge  10. Await result of bilateral lower extremity arterial duplex ultrasound completed on 9/2/2020  11. Stable from cardiac standpoint      Electronically signed by KENJI Dobbs on 9/4/2020 at 10:20 AM    Attending Supervising [de-identified] Attestation Statement  The patient is a 56 y.o. female.  I have performed a history and physical examination of the patient. I discussed the case with the physician assistant. I reviewed the patient's Past Medical History, Past Surgical History, Medications, and Allergies. Physical Exam:  Vitals:    09/03/20 1834 09/04/20 0726 09/04/20 0729 09/04/20 1000   BP: (!) 178/57 (!) 158/65     Pulse: 69 63  65   Resp: 18 18     Temp: 98.8 °F (37.1 °C) 97.9 °F (36.6 °C)     TempSrc: Oral Oral     SpO2: 94% 95% 96%    Weight:   250 lb 14.4 oz (113.8 kg)    Height:           Review of Systems - Respiratory ROS: no cough, shortness of breath, or wheezing  Cardiovascular ROS: no chest pain or dyspnea on exertion  Gastrointestinal ROS: no abdominal pain, change in bowel habits, or black or bloody stools    Pulmonary/Chest: clear to auscultation bilaterally- no wheezes, rales or rhonchi, normal air movement, no respiratory distress  Cardiovascular: normal rate, normal S1 and S2, no gallops, intact distal pulses and no carotid bruits  Abdomen: soft, non-tender, non-distended, normal bowel sounds, no masses or organomegaly    Active Hospital Problems    Diagnosis Date Noted    Pre-operative clearance [Z01.818]      Priority: High    Pyogenic inflammation of bone (HCC) [M86.9]      Priority: Low    Cellulitis [L03.90] 08/30/2020     Priority: Low        I reviewed and agree with the findings and plan documented in her note . ASSESSMENT:     Preoperative clearance  Left foot wound/cellulitis  History of uncontrolled diabetes  Essential hypertension  Hyperlipidemia  History of tobacco abuse  Morbid obesity  History of SVT status post ablation in 2014 at [facility]  Normal LVF  Negative b/l LE art duplex US. No sig. Stenosis.       PLAN:   1. As always, aggressive risk factor modification is strongly recommended. We should adhere to the JNC VIII guidelines for HTN management and the NCEPATP III guidelines for LDL-C management. 2. GI/DVT proph  3. Monitor on tele  4.  Consider ischemic workup as outpatient in future given risk factors.  She is asymptomatic now.           Electronically signed by Steve Manrique DO on 9/4/20 at 2:08 PM EDT

## 2020-09-04 NOTE — PROGRESS NOTES
Patient given discharge instructions. Aware of Keith Monzon coming to help with ABX , RX given to patient to give to Keith Monzon when they bring the ABX. Patient aware of follow up and dressing changes every other day. Orders on d/c. Aware of only new RX which is ABX and purpose. Advised to call the DrXavierwith any questions or concerns. Deleted NCP. Stable. Will d/c as ordered. Patient left viz . Stable at d/c.

## 2020-09-04 NOTE — PLAN OF CARE
Patient seen at bedside. No new compliants. Surgical incision well coapted minimal serosanguinous drainage. Incision painted with betadine and DSD applied. Patient instructed on how to do the dressing. She is to change it every other day until she sees me in the wound center on 9/11/20 @ 2pm  She is to keep the foot dry. Partial WB with Surgical shoe.   Abx according to ID  OK to discharge from a surgical standpoint

## 2020-09-04 NOTE — PROGRESS NOTES
Spiritual Care Services     Summary of Visit:  Patient was resting on her bed anticipating her discharge. Patient expressed that she was feeling much better and appeared much brighter. We discussed her Advanced Directives and she will take the documents home with her and complete them on her home. Spiritual Assessment/Intervention/Outcomes:    Encounter Summary  Services provided to[de-identified] Patient  [facility]  Support System: Spouse, Children, Orthodoxy/brit community  Place of Jainism: [facility]  Contact Orthodoxy: No  Continue Visiting: No  Complexity of Encounter: Low  Length of Encounter: 15 minutes  Spiritual Assessment Completed: Yes  Advance Care Planning: Yes(Patient says she has documents at [facility])  Routine  Type:  Follow up  Assessment: Calm, Approachable, Hopeful, Peaceful  Intervention: Sustaining presence/ Ministry of presence, Discussed illness/injury and it's impact  Outcome: Expressed gratitude, Engaged in conversation, Receptive     Spiritual/Quaker  Type: Spiritual support  Assessment: Approachable, Coping, Hopeful, Concerns with suffering  Intervention: Prayer, Nurtured hope, Active listening, Explored feelings, thoughts, concerns, Explored coping resources, Anointing  Outcome: Less anxious, less agitated, Tearful, Expressed gratitude, Expressed feelings of perry, peace, and/or awe, Comfort  Sacraments  Sacrament of Sick-Anointing: Anointed     Advance Directives (For Healthcare)  Pre-existing DNR Comfort Care/DNR Arrest/DNI Order: No  Healthcare Directive: Yes, patient has an advance directive for healthcare treatment  Type of Healthcare Directive: Durable power of  for health care, Living will  Copy in Chart: No, copy requested from clinic  Information on Healthcare Directives Requested: Yes  Patient Requests Assistance: Yes, referral made to   Advance Directives: Documents given  Healthcare Agent Appointed: [facility] Agent's Name: Marcio Fischer

## 2020-09-04 NOTE — PROGRESS NOTES
Infectious Disease     Patient Name: Eloisa Gan  Date: 9/4/2020  YOB: 1960  Medical Record Number: [id number]      Osteomyelitis left foot        History of Present Illness:  Depression diabetes hypertension SVT      Patient presented 8/30/2020 with red swollen painful L foot  [place] with open foot wound  Patient had been receiving oral antibiotics  White blood cell count was 20,000    Patient had been hospitalized in early August with diabetic foot infection initially treated with vancomycin meropenem  MRI not show evidence of osteomyelitis at that time  Patient was discharged on oral doxycycline  Patient never followed up with me as outpatient    Cultures from 8/30/2020 of the LEFT foot show streptococci coagulase-negative staphylococci anaerobic bacteria      Patient went to surgery  9/2/2020 incision and drainage left fifth metatarsal head with resection        Review of Systems   Constitutional: Negative for diaphoresis, fatigue and fever. Respiratory: Negative. Cardiovascular: Negative. Gastrointestinal: Negative. Physical Exam   Constitutional: No distress. Cardiovascular:   No murmur heard. Pulmonary/Chest: Breath sounds normal. No respiratory distress. She has no wheezes. She exhibits no tenderness. Abdominal: Soft. Bowel sounds are normal. She exhibits no distension. There is no abdominal tenderness. There is no rebound. Musculoskeletal:      Comments: Left foot wound close incision intact minimal drainage no significant redness   Skin: She is not diaphoretic. Blood pressure (!) 147/56, pulse 71, temperature 98.4 °F (36.9 °C), temperature source Oral, resp. rate 18, height 5' 6\" (1.676 m), weight 250 lb 14.4 oz (113.8 kg), SpO2 95 %.       .   Lab Results   Component Value Date    WBC 9.3 09/03/2020    HGB 11.3 (L) 09/03/2020    HCT 34.9 (L) 09/03/2020    MCV 79.9 (L) 09/03/2020     (H) 09/03/2020     Lab Anaerobic Culture  Culture in progress     Culture Surgical  No growth 24 hours          ASSESSMENT:    PLAN:    Osteomyelitis left foot  Change to [facility] for 6 weeks

## 2020-09-04 NOTE — CARE COORDINATION
FAXED IV INVANZ ORDER TO [facility]
PT FOR SX ON 9/2, WILL NEED PT/OT EVALS AFTER FOR DC PLANNING. PT CURRENTLY HAS A BOOT AND FOLLOWS WITH THE WOUND CENTER.
SPOKE WITH LENA AT 1ST CHOICE. INFO NEEDED RESENT AS THEY DID NOT GET IT. RESENT INFO INDIVIDUALLY. SHE RECIEVED H&P AND WILL LET US KNOW IF SHE DOES NOT GET THE REST.
STAT         IV BENEFIT REQUEST FORM     [facility]                        [address] [place]     REQUESTED BY: Electronically signed by Ady Arnold RN on 9/3/2020 at 8:32 AM                                               RN/C3: PHONE: [phone]      DATE:/TIME OF REQUEST: 20  TIME: 8:32 AM        TO: [address] [place] [address]        FAX TO: [phone]     PHONE: [phone]      THIS PATIENT HAS BEEN IDENTIFIED TO POSSIBLY NEED LONG TERM IV'S.     PLEASE CHECK INSURANCE COVERAGE FOR THE FOLLOWING PT/DRUGS.     PATIENT'S NAME: Keturah Macdonald                              [facility]   PATIENT'S : 1960  PATIENT ADDRESS: [phone] [id number]  SSN:    ([id number])      PAYOR NAME:  Payor: [employer] / Plan: [employer] [address] / Product Type: *No Product type* /      DRUG: (INVANZ)                         DOSE: (1G)            FREQUENCY: Q (24) HR     DRUG: ()                         DOSE: ()            FREQUENCY: Q () HR     DRUG: ()                         DOSE: ()            FREQUENCY: Q () HR     __________ CHECK HERE IF PT HAS NO INSURANCE AND REQUESTING SELF PAY COST.     *IF [address] [place] [address] IS NOT A PROVIDER FOR THIS PATIENT, PLEASE FORWARD INFO VIA FAX TO [facility] @ [phone],(PHONE NUMBER: [phone]) TO RUN BENEFIT VERIFICATION AND NOTIFY THE ABOVE C3 OF THIS PLAN.     (FAX FACE SHEET WITH DEMOGRAPHICS AND INSURANCE INFO WITH THIS FORM.)  PLEASE FAX BENEFIT INFO TO: THE [phone]     This message is intended only for the use of the individual or entity to which it is addressed and may contain information that is privileged, confidential, and exempt from disclosure under applicable law.  If the reader of the notice is not intended recipient of the employee/agent responsible for delivering the message to the intended
[facility] AT [facility] Initial Discharge Assessment    Met with Patient to discuss discharge plan. PCP: Nubia Arguelles MD                                Date of Last Visit: 8/11/20    If no PCP, list provided? N/A    Discharge Planning    Living Arrangements: independently at home    Who do you live with?     Who helps you with your care:  self    If lives at home:     Do you have any barriers navigating in your home? no    Patient can perform ADL? Yes may need help with dressing changes. Current Services (outpatient and in home) : Outpatient wound care    Dialysis: No    Is transportation available to get to your appointments? Yes    DME Equipment:  no    Respiratory equipment: no.    Respiratory provider:  no     Pharmacy:  yes - [facility]    Consult with Medication Assistance Program?  No                Does Patient Have a High-Risk for Readmission Diagnosis (CHF, PN, MI, COPD)? No      Initial Discharge Plan? (Note: please see concurrent daily documentation for any updates after initial note). Pt may need iv antibiotics depending on course of treatment. c for wound monitoring or continue with outpatient tx. CM to assess for further d/c needs or referrals.     Electronically signed by Bailey Beasley on 8/30/2020 at 12:08 PM
notified than any dissemination, distribution or copying of this communication is strictly prohibited. Please contact the sender for further instructions on handling the information.

## 2020-09-05 LAB
ANAEROBIC CULTURE: NORMAL
ANAEROBIC CULTURE: NORMAL
CULTURE SURGICAL: NORMAL
CULTURE SURGICAL: NORMAL
GRAM STAIN RESULT: NORMAL
GRAM STAIN RESULT: NORMAL

## 2020-09-08 ENCOUNTER — TELEPHONE (OUTPATIENT)
Dept: INFECTIOUS DISEASES | Age: 60
End: 2020-09-08

## 2020-09-08 NOTE — TELEPHONE ENCOUNTER
Recieved call from Marycruz Monzon( [address] [place] [address] AT [facility]) Asked if patient need blood Work?

## 2020-09-11 ENCOUNTER — HOSPITAL ENCOUNTER (OUTPATIENT)
Dept: WOUND CARE | Age: 60
Discharge: HOME | End: 2020-09-11
Payer: COMMERCIAL

## 2020-09-11 VITALS — TEMPERATURE: 97.6 F | HEART RATE: 66 BPM | SYSTOLIC BLOOD PRESSURE: 212 MMHG | DIASTOLIC BLOOD PRESSURE: 93 MMHG

## 2020-09-11 PROCEDURE — 99213 OFFICE O/P EST LOW 20 MIN: CPT

## 2020-09-11 NOTE — PROGRESS NOTES
Norma Schwartz [address]   Progress Note and Procedure Note      [address]  MEDICAL RECORD NUMBER:  [id number]  AGE: 56 y.o. GENDER: female  : 1960  EPISODE DATE:  2020    Subjective:     Chief Complaint   Patient presents with    Wound Check         HISTORY of PRESENT ILLNESS HPI     Osvaldo St is a 56 y.o. female who presents today for wound/ulcer evaluation. History of Wound Context: Patient presents s/p excision of DM ulcer with Metatarsal head resection on . She is currently on IV abx. She has an area that is draining and [facility] put alginate on it.   Wound/Ulcer Pain Timing/Severity: none  Quality of pain: N/A  Severity:  0 / 10   Modifying Factors: None  Associated Signs/Symptoms: edema    Ulcer Identification:  Ulcer Type: diabetic, pressure and neuropathic  Contributing Factors: edema, diabetes, poor glucose control, chronic pressure and obesity    Wound: N/A        PAST MEDICAL HISTORY        Diagnosis Date    Depression     DM type 2 (diabetes mellitus, type 2) (HCC)     Hyperlipidemia     Hypertension     SVT (supraventricular tachycardia) ([address]        PAST SURGICAL HISTORY    Past Surgical History:   Procedure Laterality Date    ABLATION OF DYSRHYTHMIC FOCUS      EYE SURGERY      FOOT DEBRIDEMENT Left 2020    INCISION AND DRAINAGE LEFT FIFTH METATARSAL HEAD RESECTION performed by Wes Fleming DPM at [address] [facility] Right        FAMILY HISTORY    Family History   Family history unknown: Yes       SOCIAL HISTORY    Social History     Tobacco Use    Smoking status: Never Smoker    Smokeless tobacco: Never Used   Substance Use Topics    Alcohol use: Yes     Comment: socailly     Drug use: Never       ALLERGIES    Allergies   Allergen Reactions    Cat Hair Extract      itching, ankle and hands    Penicillins     Statins Other (See Comments)     Skin sloughing    Sulfa Antibiotics     Trimethoprim Itching and Other (See Comments)     Other reaction(s): Other: See Comments  Burning and redness in eye(eye drop)  Burning and redness in eye(eye drop)         MEDICATIONS    Current Outpatient Medications on File Prior to Encounter   Medication Sig Dispense Refill    ertapenem (INVANZ) infusion Infuse 1,000 mg intravenously every 24 hours Compound per protocol. 31808 mg 0    insulin glargine (LANTUS) 100 UNIT/ML injection vial Inject 14 Units into the skin nightly      glipiZIDE (GLUCOTROL) 5 MG tablet TAKE 1 TABLET BY MOUTH ONCE DAILY BEFORE A MEAL      lisinopril (PRINIVIL;ZESTRIL) 20 MG tablet TAKE 1 TABLET BY MOUTH ONCE DAILY 30 tablet 3    metFORMIN (GLUCOPHAGE) 1000 MG tablet Take 2 tablets by mouth 2 times daily (with meals) (Patient taking differently: Take 1,000 mg by mouth 2 times daily (with meals) ) 60 tablet 3    albuterol (PROVENTIL) (2.5 MG/3ML) 0.083% nebulizer solution Take 3 mLs by nebulization every 6 hours as needed for Wheezing or Shortness of Breath 120 each 3    mupirocin (BACTROBAN) 2 % ointment Apply 22 g topically 3 times daily Apply topically 3 times daily. No current facility-administered medications on file prior to encounter. REVIEW OF SYSTEMS    Pertinent items are noted in HPI. Objective:      BP (!) 212/93   Pulse 66   Temp 97.6 °F (36.4 °C) (Oral)     Wt Readings from Last 3 Encounters:   09/04/20 250 lb 14.4 oz (113.8 kg)   08/11/20 250 lb (113.4 kg)   08/11/20 250 lb (113.4 kg)       PHYSICAL EXAM    Constitutional:   Well nourished and well developed. Appears neat and clean. Patient is alert, oriented x3, and in no apparent distress. Respiratory:  Respiratory effort is easy and symmetric bilaterally. Rate is normal at rest and on room air. Vascular:  Pedal Pulses is palpable and audible signal noted with doppler. Capillary refill is <5 sec to digits bilateral.  Extremities negative for pitting edema. Neurological:  Gross and Light touch intact.  Protective sensation absent    Dermatological:  Wound description noted in wound assessment. The surtures are intact and well coapted on the bottom however there is an area on the side that is open and draining serosanguinous drainage. Depth is 1.5cm  Surgical cultures show no growth. Psychiatric:  Judgement and insight intact. Short and long term memory intact. No evidence of depression, anxiety, or agitation. Patient is calm, cooperative, and communicative. Appropriate interactions and affect. Assessment:      Problem List Items Addressed This Visit     None           Procedure Note  Indications:  Based on my examination of this patient's wound(s)/ulcer(s) today, debridement is not required to promote healing and evaluate the wound base. Wound 08/02/20 Foot Left;Lateral (Active)   Wound Image   09/11/20 1018   Wound Diabetic 09/11/20 1018   Offloading for Diabetic Foot Ulcers Forefoot offloading shoe 08/11/20 1534   Dressing Status Clean;Dry; Intact 09/02/20 2135   Dressing Changed Dressing reinforced 08/31/20 0857   Dressing/Treatment Collagen with Ag;Dry dressing 08/11/20 1534   Wound Cleansed Rinsed/Irrigated with saline 09/11/20 1018   Wound Length (cm) 4 cm 09/11/20 1037   Wound Width (cm) 0.2 cm 09/11/20 1037   Wound Depth (cm) 1.5 cm 09/11/20 1037   Wound Surface Area (cm^2) 0.8 cm^2 09/11/20 1037   Change in Wound Size % (l*w) 80.95 09/11/20 1037   Wound Volume (cm^3) 1.2 cm^3 09/11/20 1037   Wound Healing % -2900 09/11/20 1037   Post-Procedure Length (cm) 0.6 cm 08/11/20 1512   Post-Procedure Width (cm) 1.5 cm 08/11/20 1512   Post-Procedure Depth (cm) 0.2 cm 08/11/20 1512   Post-Procedure Surface Area (cm^2) 0.9 cm^2 08/11/20 1512   Post-Procedure Volume (cm^3) 0.18 cm^3 08/11/20 1512   Wound Assessment Edges well approximated;Oil Trough 09/11/20 1018   Drainage Amount Small 09/11/20 1018   Drainage Description Brown;Serous 09/11/20 1018   Odor None 09/11/20 1018   Margins Defined edges 09/11/20 1018 Pema-wound Assessment Calloused 09/11/20 1018   Number of days: 40       Incision 09/02/20 Foot Left (Active)   Drainage Amount None 09/02/20 1433   Dressing/Treatment Other (comment) 09/02/20 1351   Dressing Changed Changed/New 09/02/20 1351   Dressing Status Clean;Dry; Intact 09/02/20 2135   Number of days: 8             Plan:   Patient examine  Mesalt packing daily and alginate over the top. Follow up in one week. Shoe off loaded. Plastizote was noted to be bottomed out. Treatment Note please see attached Discharge Instructions    Written patient dismissal instructions given to patient and signed by patient or POA. Discharge [address] [facility]   Physician Orders and Discharge [address]  Telephone: [phone]        NAME:  Katie Ling  YOB: 1960  MEDICAL RECORD NUMBER:  [id number]     Home Care/Facility:   [facility] - NEW REFERRAL FOR LEFT FOOT WOUND CARE.     Wound Location:   LEFT FOOT  Dressing orders: 1. Cleanse wound(s) with normal saline. 2. GENTLY PACK WOUND WITH MESALT ROPE. 3. COVER WITH CALCIUM ALGINATE AND DRY DRESSING. 4. CHANGE DRESSING EVERY DAY.     Compression:  NONE     Offloading Device:  PATIENT TO WEAR A FOREFOOT OFFLOADING SHOE WITH FELTED FOAM INSERTS, PLACE A PILLOW UNDER LEFT LEG WHILE SLEEPING.     Other Instructions:  Keep all dressings clean, dry and intact. Keep pressure off the wound(s) at all times.      Follow up visit   1 Week September 18, 2020 AT 10:30AM     Please give 24 hour notice if unable to keep appointment. [phone]     If you experience any of the following, please call the Wound Care Service at  [phone] or go to the nearest emergency room.         *Increase in pain         *Temperature over 101           *Increase in drainage from your wound or a foul odor  *Uncontrolled swelling            *Need for compression bandage changes due to slippage, breakthrough drainage       PLEASE NOTE: IF YOU ARE UNABLE TO OBTAIN WOUND SUPPLIES, CONTINUE TO USE THE SUPPLIES YOU HAVE AVAILABLE UNTIL YOU ARE ABLE TO REACH US.  IT IS MOST IMPORTANT TO KEEP THE WOUND COVERED AT ALL TIMES  Electronically signed by Levar Shepherd DPM on 9/11/2020 at 10:50 AM          Electronically signed by Levar Shepherd DPM on 9/11/2020 at 10:51 AM

## 2020-09-11 NOTE — CODE DOCUMENTATION
[id number] Physician Billing Sheet. Melinda Burnham  AGE: 56 y.o.    GENDER: female  : 1960  TODAY'S DATE:  2020    ICD-10  [facility] Problems    Diagnosis Date Noted    Cellulitis [L03.90] 2020    Non-pressure chronic ulcer of other part of left foot [facility] [L97.522] 2020    DM type 2 (diabetes mellitus, type 2) ([facility] [E11.9]        PHYSICIAN PROCEDURES    CPT CODE  24725      Electronically signed by Cristo Magallanes DPM on 2020 at 12:27 PM

## 2020-09-13 NOTE — DISCHARGE SUMMARY
Hospital Medicine Discharge Summary    Manoj Bo  :  1960  MRN:  [id number]    Admit date:  2020  Discharge date: 2020    Admitting Physician:  Teressa Carrizales MD  Primary Care Physician:  Becka Trammell MD    Manoj Bo is a 56 y.o. female that was admitted and treated at [facility] for the following medical issues: Active Problems:    Pre-operative clearance    Cellulitis    Pyogenic inflammation of bone (HCC)  Resolved Problems:    * No resolved hospital problems. *      Discharge Diagnoses: Active Problems:    Pre-operative clearance    Cellulitis    Pyogenic inflammation of bone (HCC)  Resolved Problems:    * No resolved hospital problems. *    Chief Complaint   Patient presents with    Foot Pain     left foot; red, swollen, painful       Hospital Course:   Manoj Bo is a 56 y.o. female who was admitted to the hospital with open foot wound further complicated by cellulitis. Seen by podiatry, debridement in operating room. IV antibiotics as per infectious disease. Subsequently discharged with outpatient follow-up. Pt was discharge in a stable condition. BP (!) 147/56   Pulse 71   Temp 98.4 °F (36.9 °C) (Oral)   Resp 18   Ht 5' 6\" (1.676 m)   Wt 250 lb 14.4 oz (113.8 kg)   SpO2 95%   BMI 40.50 kg/m²     Patient was seen by the following consultants while admitted to [facility]:   Consults:  IP CONSULT TO PODIATRY  IP CONSULT TO SPIRITUAL SERVICES  IP CONSULT TO CARDIOLOGY  IP CONSULT TO INFECTIOUS DISEASES  IP CONSULT TO HOME CARE NEEDS    Significant Diagnostic Studies:    Refer to chart if  No results found.     Discharge Medications:       Kenji Graves   Home Medication Instructions RXN:[id number]    Printed on:20 1304   Medication Information                      albuterol (PROVENTIL) (2.5 MG/3ML) 0.083% nebulizer solution  Take 3 mLs by nebulization every 6 hours as needed for Wheezing or Shortness of Breath             ertapenem (INVANZ) infusion  Infuse 1,000 mg intravenously every 24 hours Compound per protocol. glipiZIDE (GLUCOTROL) 5 MG tablet  TAKE 1 TABLET BY MOUTH ONCE DAILY BEFORE A MEAL             insulin glargine (LANTUS) 100 UNIT/ML injection vial  Inject 14 Units into the skin nightly             lisinopril (PRINIVIL;ZESTRIL) 20 MG tablet  TAKE 1 TABLET BY MOUTH ONCE DAILY             metFORMIN (GLUCOPHAGE) 1000 MG tablet  Take 2 tablets by mouth 2 times daily (with meals)             mupirocin (BACTROBAN) 2 % ointment  Apply 22 g topically 3 times daily Apply topically 3 times daily. Disposition:   If discharged to Home, Any [place] [address] needs that were indicated and/or required as been addressed and set up by Social Work. Condition at discharge: Pt was medically stable at the time of discharge. Activity: activity as tolerated    Total time taken for discharging this patient: 40 minutes. Greater than 70% of time was spent focused exclusively on this patient. Time was taken to review chart, discuss plans with consultants, reconciling medications, discussing plan answering questions with patient.      Signed:  Nina Anderson

## 2020-09-15 ENCOUNTER — OFFICE VISIT (OUTPATIENT)
Dept: FAMILY MEDICINE CLINIC | Age: 60
End: 2020-09-15
Payer: COMMERCIAL

## 2020-09-15 VITALS
SYSTOLIC BLOOD PRESSURE: 138 MMHG | DIASTOLIC BLOOD PRESSURE: 80 MMHG | HEIGHT: 66 IN | TEMPERATURE: 98.1 F | BODY MASS INDEX: 39.7 KG/M2 | HEART RATE: 75 BPM | OXYGEN SATURATION: 95 % | WEIGHT: 247 LBS

## 2020-09-15 LAB
CREATININE URINE: 39.1 MG/DL
MICROALBUMIN UR-MCNC: 4.6 MG/DL
MICROALBUMIN/CREAT UR-RTO: 117.6 MG/G (ref 0–30)

## 2020-09-15 PROCEDURE — G8417 CALC BMI ABV UP PARAM F/U: HCPCS | Performed by: FAMILY MEDICINE

## 2020-09-15 PROCEDURE — 3017F COLORECTAL CA SCREEN DOC REV: CPT | Performed by: FAMILY MEDICINE

## 2020-09-15 PROCEDURE — 1036F TOBACCO NON-USER: CPT | Performed by: FAMILY MEDICINE

## 2020-09-15 PROCEDURE — 2022F DILAT RTA XM EVC RTNOPTHY: CPT | Performed by: FAMILY MEDICINE

## 2020-09-15 PROCEDURE — 3046F HEMOGLOBIN A1C LEVEL >9.0%: CPT | Performed by: FAMILY MEDICINE

## 2020-09-15 PROCEDURE — G8427 DOCREV CUR MEDS BY ELIG CLIN: HCPCS | Performed by: FAMILY MEDICINE

## 2020-09-15 PROCEDURE — 1111F DSCHRG MED/CURRENT MED MERGE: CPT | Performed by: FAMILY MEDICINE

## 2020-09-15 PROCEDURE — 99214 OFFICE O/P EST MOD 30 MIN: CPT | Performed by: FAMILY MEDICINE

## 2020-09-15 RX ORDER — ATORVASTATIN CALCIUM 20 MG/1
20 TABLET, FILM COATED ORAL DAILY
Qty: 30 TABLET | Refills: 3 | Status: SHIPPED | OUTPATIENT
Start: 2020-09-15

## 2020-09-15 RX ORDER — FLUCONAZOLE 150 MG/1
TABLET ORAL
Qty: 2 TABLET | Refills: 0 | Status: SHIPPED | OUTPATIENT
Start: 2020-09-15 | End: 2020-11-10

## 2020-09-15 NOTE — PROGRESS NOTES
vial Inject 14 Units into the skin nightly      glipiZIDE (GLUCOTROL) 5 MG tablet TAKE 1 TABLET BY MOUTH ONCE DAILY BEFORE A MEAL      lisinopril (PRINIVIL;ZESTRIL) 20 MG tablet TAKE 1 TABLET BY MOUTH ONCE DAILY 30 tablet 3    metFORMIN (GLUCOPHAGE) 1000 MG tablet Take 2 tablets by mouth 2 times daily (with meals) (Patient taking differently: Take 1,000 mg by mouth 2 times daily (with meals) ) 60 tablet 3    albuterol (PROVENTIL) (2.5 MG/3ML) 0.083% nebulizer solution Take 3 mLs by nebulization every 6 hours as needed for Wheezing or Shortness of Breath 120 each 3    mupirocin (BACTROBAN) 2 % ointment Apply 22 g topically 3 times daily Apply topically 3 times daily. No current facility-administered medications for this visit. ROS  CONSTITUTIONAL: The patient denies fevers, chills, sweats and body ache. HEENT: Denies headache, blurry vision, eye pain, tinnitus, vertigo,  sore throat, neck or thyroid masses. RESPIRATORY: Denies cough, sputum, hemoptysis. CARDIAC: Denies chest pain, pressure, palpitations, Denies lower extremity edema. GASTROINTESTINAL: Denies abdominal pain, constipation, diarrhea, bleeding in the stools,   GENITOURINARY: Denies dysuria, hematuria, nocturia or frequency, urinary incontinence. NEUROLOGIC: Denies headaches, dizziness, syncope, weakness  MUSCULOSKELETAL: denies changes in range of motion, joint pain, stiffness. ENDOCRINOLOGY: Denies heat or cold intolerance. HEMATOLOGY: Denies easy bleeding or blood transfusion,anemia  DERMATOLOGY: Denies changes in moles or pigmentation changes. PSYCHIATRY: Denies depression, agitation or anxiety.     Past Medical History:   Diagnosis Date    Depression     DM type 2 (diabetes mellitus, type 2) ([facility])     Hyperlipidemia     Hypertension     SVT (supraventricular tachycardia) ([facility])         Past Surgical History:   Procedure Laterality Date    ABLATION OF DYSRHYTHMIC FOCUS      EYE SURGERY      FOOT DEBRIDEMENT Left 9/2/2020    INCISION AND DRAINAGE LEFT FIFTH METATARSAL HEAD RESECTION performed by Wes Fleming DPM at [facility] FOOT SURGERY Right         Family History   Family history unknown: Yes        Social History     Socioeconomic History    Marital status:      Spouse name: Not on file    Number of children: Not on file    Years of education: Not on file    Highest education level: Not on file   Occupational History    Not on file   Social Needs    Financial resource strain: Not on file    Food insecurity     Worry: Not on file     Inability: Not on file    Transportation needs     Medical: Not on file     Non-medical: Not on file   Tobacco Use    Smoking status: Never Smoker    Smokeless tobacco: Never Used   Substance and Sexual Activity    Alcohol use: Yes     Comment: socailly     Drug use: Never    Sexual activity: Not on file   Lifestyle    Physical activity     Days per week: Not on file     Minutes per session: Not on file    Stress: Not on file   Relationships    Social connections     Talks on phone: Not on file     Gets together: Not on file     Attends Church service: Not on file     Active member of club or organization: Not on file     Attends meetings of clubs or organizations: Not on file     Relationship status: Not on file    Intimate partner violence     Fear of current or ex partner: Not on file     Emotionally abused: Not on file     Physically abused: Not on file     Forced sexual activity: Not on file   Other Topics Concern    Not on file   Social History Narrative    Not on file        /80   Pulse 75   Temp 98.1 °F (36.7 °C) (Oral)   Ht 5' 6\" (1.676 m)   Wt 247 lb (112 kg)   SpO2 95%   BMI 39.87 kg/m²        Physical Exam:    General appearance - alert, well appearing, and in no distress  Mental Status - alert, oriented to person, place, and time  Eyes - pupils equal and reactive, extraocular eye movements intact   Ears - bilateral TM's and external ear canals normal   Nose - normal and patent, no erythema, discharge or polyps   Sinuses - Normal paranasal sinuses without tenderness   Throat - mucous membranes moist, pharynx normal without lesions   Neck - supple, no significant adenopathy   Thyroid - thyroid is normal in size without nodules or tenderness    Chest - clear to auscultation, no wheezes, rales or rhonchi, symmetric air entry   Heart - normal rate, regular rhythm, normal S1, S2, no murmurs, rubs, clicks or gallops  Abdomen - soft, nontender, nondistended, no masses or organomegaly   Back exam - full range of motion, no tenderness, palpable spasm or pain on motion   Neurological - alert, oriented, normal speech, no focal findings or movement disorder noted   Musculoskeletal - no joint tenderness, deformity or swelling   Extremities - wound wrapped. Skin - normal coloration and turgor, no rashes, no suspicious skin lesions noted    Labs   No results found for: TSHREFLEX  No results found for: TSH       FINDINGS:         There is no deep venous thrombosis, abnormal masses, focal fluid collections or other findings of concern identified within the left lower extremity.                WORSENING LATERAL FOREFOOT SOFT TISSUE SWELLING.         NO RADIOGRAPHIC EVIDENCE OF OSTEOMYELITIS, FRACTURE, OR OTHER SIGNIFICANT CHANGES FROM RECENT PRIOR STUDIES IDENTIFIED         A/P: Naldo Christel A Chacha 56 y.o. female presenting for     1. Yeast infection    - fluconazole (DIFLUCAN) 150 MG tablet; Take one dose now. Repeat in 3 days if symptoms persists. Dispense: 2 tablet; Refill: 0    2. Encounter for screening mammogram for breast cancer    - EMA DIGITAL SCREEN W OR WO CAD BILATERAL; Future    3. Screening for colon cancer    - Cologuard (For External Results Only); Future    4. Type 2 diabetes mellitus with foot ulcer, with long-term current use of insulin (HCC)  - Microalbumin / Creatinine Urine Ratio; Future    5.  Screening for lipid disorders    - Lipid, Fasting; Future    6. Cellulitis/diabetic ulcer  Continue with IV abx. Follow up with ID and wound clinic. Please note, this report has been partially produced using speech recognition software  and may cause  and /or contain errors related to that system including grammar, punctuation and spelling as well as words and phrases that may seem inappropriate. If there are questions or concerns please feel free to contact me to clarify.

## 2020-09-18 ENCOUNTER — HOSPITAL ENCOUNTER (OUTPATIENT)
Dept: WOUND CARE | Age: 60
Discharge: HOME | End: 2020-09-18
Payer: COMMERCIAL

## 2020-09-18 VITALS
RESPIRATION RATE: 20 BRPM | DIASTOLIC BLOOD PRESSURE: 88 MMHG | TEMPERATURE: 97.3 F | SYSTOLIC BLOOD PRESSURE: 173 MMHG | HEART RATE: 68 BPM

## 2020-09-18 PROBLEM — Z98.890 S/P FOOT SURGERY, LEFT: Chronic | Status: ACTIVE | Noted: 2020-09-18

## 2020-09-18 PROCEDURE — 87070 CULTURE OTHR SPECIMN AEROBIC: CPT

## 2020-09-18 PROCEDURE — 87075 CULTR BACTERIA EXCEPT BLOOD: CPT

## 2020-09-18 PROCEDURE — 87205 SMEAR GRAM STAIN: CPT

## 2020-09-18 PROCEDURE — 99213 OFFICE O/P EST LOW 20 MIN: CPT

## 2020-09-18 NOTE — CODE DOCUMENTATION
[address] Physician Billing Sheet. Melinda Burnham  AGE: 56 y.o.    GENDER: female  : 1960  TODAY'S DATE:  2020    ICD-10 CODES  Active Hospital Problems    Diagnosis Date Noted    S/P foot surgery, left [Z98.890] 2020    Non-pressure chronic ulcer of other part of left foot with fat layer exposed ([facility] [L97.522] 2020    Open wound of left foot [S91.302A] 2020    DM type 2 (diabetes mellitus, type 2) ([facility] [E11.9]        PHYSICIAN PROCEDURES    CPT CODE  48797      Electronically signed by Ava Hutchinson DPM on 2020 at 11:54 AM

## 2020-09-18 NOTE — PROGRESS NOTES
Norma [address]   Progress Note and Procedure Note      Marybeth Curry  MEDICAL RECORD NUMBER:  [id number]  AGE: 56 y.o. GENDER: female  : 1960  EPISODE DATE:  2020    Subjective:     Chief Complaint   Patient presents with    Wound Check     left foot wound recheck         HISTORY of PRESENT ILLNESS HPI     Marybeth Curry is a 56 y.o. female who presents today for wound/ulcer evaluation. History of Wound Context: Patient presents s/p excision of DM ulcer with Metatarsal head resection on . She is currently on IV abx. She has an area that is draining and [facility] put alginate on it.   Wound/Ulcer Pain Timing/Severity: none  Quality of pain: N/A  Severity:  0 / 10   Modifying Factors: None  Associated Signs/Symptoms: edema    Ulcer Identification:  Ulcer Type: diabetic, pressure and neuropathic  Contributing Factors: edema, diabetes, poor glucose control, chronic pressure and obesity    Wound: N/A        PAST MEDICAL HISTORY        Diagnosis Date    Depression     DM type 2 [facility]di[facility] mellitus[facility] type 2) [facility])     Hyperlipidemia     Hypertension     SVT (supraventricular tachycardia) ([address]        PAST SURGICAL HISTORY    Past Surgical History:   Procedure Laterality Date    ABLATION OF DYSRHYTHMIC FOCUS      EYE SURGERY      FOOT DEBRIDEMENT Left 2020    INCISION AND DRAINAGE LEFT FIFTH METATARSAL HEAD RESECTION performed by Wes Fleming DPM at [address] [place] [address] Right        FAMILY HISTORY    Family History   Family history unknown: Yes       SOCIAL HISTORY    Social History     Tobacco Use    Smoking status: Never Smoker    Smokeless tobacco: Never Used   Substance Use Topics    Alcohol use: Yes     Comment: socailly     Drug use: Never       ALLERGIES    Allergies   Allergen Reactions    Cat Hair Extract      itching, ankle and hands    Penicillins     Statins Other (See Comments)     Skin sloughing    Sulfa Antibiotics     Trimethoprim Itching and Other (See Comments)     Other reaction(s): Other: See Comments  Burning and redness in eye(eye drop)  Burning and redness in eye(eye drop)         MEDICATIONS    Current Outpatient Medications on File Prior to Encounter   Medication Sig Dispense Refill    fluconazole (DIFLUCAN) 150 MG tablet Take one dose now. Repeat in 3 days if symptoms persists. 2 tablet 0    atorvastatin (LIPITOR) 20 MG tablet Take 1 tablet by mouth daily 30 tablet 3    ertapenem (INVANZ) infusion Infuse 1,000 mg intravenously every 24 hours Compound per protocol. 91578 mg 0    insulin glargine (LANTUS) 100 UNIT/ML injection vial Inject 14 Units into the skin nightly      glipiZIDE (GLUCOTROL) 5 MG tablet TAKE 1 TABLET BY MOUTH ONCE DAILY BEFORE A MEAL      lisinopril (PRINIVIL;ZESTRIL) 20 MG tablet TAKE 1 TABLET BY MOUTH ONCE DAILY 30 tablet 3    metFORMIN (GLUCOPHAGE) 1000 MG tablet Take 2 tablets by mouth 2 times daily (with meals) (Patient taking differently: Take 1,000 mg by mouth 2 times daily (with meals) ) 60 tablet 3    mupirocin (BACTROBAN) 2 % ointment Apply 22 g topically 3 times daily Apply topically 3 times daily.  albuterol (PROVENTIL) (2.5 MG/3ML) 0.083% nebulizer solution Take 3 mLs by nebulization every 6 hours as needed for Wheezing or Shortness of Breath 120 each 3     No current facility-administered medications on file prior to encounter. REVIEW OF SYSTEMS    Pertinent items are noted in HPI. Objective:      BP (!) 173/88   Pulse 68   Temp 97.3 °F (36.3 °C) (Temporal)   Resp 20     Wt Readings from Last 3 Encounters:   09/15/20 247 lb (112 kg)   09/04/20 250 lb 14.4 oz (113.8 kg)   08/11/20 250 lb (113.4 kg)       PHYSICAL EXAM    Constitutional:   Well nourished and well developed. Appears neat and clean. Patient is alert, oriented x3, and in no apparent distress. Respiratory:  Respiratory effort is easy and symmetric bilaterally.   Rate is normal at rest and on room air.    Vascular:  Pedal Pulses is palpable and audible signal noted with doppler. Capillary refill is <5 sec to digits bilateral.  Extremities negative for pitting edema. Neurological:  Gross and Light touch intact. Protective sensation absent    Dermatological:  Wound description noted in wound assessment. The surtures are intact and well coapted on the bottom however there is an area on the side that is open and draining serosanguinous drainage. Still with Depth of  1.5cm  Recultured today. Psychiatric:  Judgement and insight intact. Short and long term memory intact. No evidence of depression, anxiety, or agitation. Patient is calm, cooperative, and communicative. Appropriate interactions and affect. Assessment:      Problem List Items Addressed This Visit     None           Procedure Note  Indications:  Based on my examination of this patient's wound(s)/ulcer(s) today, debridement is not required to promote healing and evaluate the wound base. Wound 08/02/20 Foot Left;Lateral (Active)   Wound Image   09/11/20 1018   Wound Diabetic 09/18/20 1053   Offloading for Diabetic Foot Ulcers Forefoot offloading shoe 09/11/20 1103   Dressing Status Clean;Dry; Intact 09/02/20 2135   Dressing Changed Dressing reinforced 08/31/20 0857   Dressing/Treatment Alginate;Dry dressing 09/11/20 1103   Wound Cleansed Rinsed/Irrigated with saline 09/18/20 1053   Wound Length (cm) 1 cm 09/18/20 1053   Wound Width (cm) 0.2 cm 09/18/20 1053   Wound Depth (cm) 1.8 cm 09/18/20 1053   Wound Surface Area (cm^2) 0.2 cm^2 09/18/20 1053   Change in Wound Size % (l*w) 95.24 09/18/20 1053   Wound Volume (cm^3) 0.36 cm^3 09/18/20 1053   Wound Healing % -800 09/18/20 1053   Post-Procedure Length (cm) 1 cm 09/18/20 1139   Post-Procedure Width (cm) 0.2 cm 09/18/20 1139   Post-Procedure Depth (cm) 1.5 cm 09/18/20 1139   Post-Procedure Surface Area (cm^2) 0.2 cm^2 09/18/20 1139   Post-Procedure Volume (cm^3) 0.3 cm^3 09/18/20 1139 Wound Assessment Edges well approximated;Beatty 09/11/20 1018   Drainage Amount Small 09/18/20 1053   Drainage Description Yellow 09/18/20 1053   Odor None 09/18/20 1053   Margins Defined edges 09/18/20 1053   Pema-wound Assessment Dry 09/18/20 1053   Beatty%Wound Bed 100 09/18/20 1053   Number of days: 47         Plan:   Patient examined  Follow up with Dr. Lulu Mohr  Culture taken  fede packing daily and alginate over the top. Follow up in one 10 days  Shoe off loaded. Plastizote was noted to be bottomed out. Treatment Note please see attached Discharge Instructions    Written patient dismissal instructions given to patient and signed by patient or POA. Discharge [address] and Hyperbaric Medicine   Physician Orders and Discharge [address]  Telephone: [phone]      [phone]        NAME: Shelly Burnham  DATE OF BIRTH:  1960  MEDICAL RECORD NUMBER:  [id number]     Home Care/Facility: [facility] [address]      Wound Location:   LEFT FOOT  Dressing orders: 1. Cleanse wound(s) with normal saline. 2. GENTLY PACK WOUND WITH FEDE   3. COVER WITH CALCIUM ALGINATE AND DRY DRESSING. 4. CHANGE DRESSING EVERY OTHER DAY.     Compression:  NONE     Offloading Device:  PATIENT TO WEAR A FOREFOOT OFFLOADING SHOE WITH FELTED FOAM INSERTS, PLACE A PILLOW UNDER LEFT LEG WHILE SLEEPING.     Other Instructions: BRING INSOLES TO NEXT APPOINTMENT ,  MOISTURIZE RIGHT FOOT TWO TIMES A DAY, Keep all dressings clean, dry and intact.  Keep pressure off the wound(s) at all times.      Follow up visit   September 29, 2020 AT      Please give 24 hour notice if unable to keep appointment.  [phone]     If you experience any of the following, please call the Wound Care Service at  [phone] or go to the nearest emergency room.        *Increase in pain         *Temperature over 101           *Increase in drainage from your wound or a foul odor  *Uncontrolled swelling            *Need for compression bandage changes due to slippage, breakthrough drainage       PLEASE NOTE: IF YOU ARE UNABLE TO OBTAIN WOUND SUPPLIES, CONTINUE TO USE THE SUPPLIES YOU HAVE AVAILABLE UNTIL YOU ARE ABLE TO REACH US.  IT IS MOST IMPORTANT TO KEEP THE WOUND COVERED AT ALL TIMES  Electronically signed by Swapna Pettit DPM on 9/18/2020 at 11:48 AM          Electronically signed by Swapna Pettit DPM on 9/18/2020 at 11:49 AM

## 2020-09-20 LAB
ANAEROBIC CULTURE: NORMAL
GRAM STAIN RESULT: NORMAL
WOUND/ABSCESS: NORMAL

## 2020-09-29 ENCOUNTER — HOSPITAL ENCOUNTER (OUTPATIENT)
Dept: WOUND CARE | Age: 60
Discharge: HOME | End: 2020-09-29
Payer: COMMERCIAL

## 2020-09-29 ENCOUNTER — VIRTUAL VISIT (OUTPATIENT)
Dept: INFECTIOUS DISEASES | Age: 60
End: 2020-09-29
Payer: COMMERCIAL

## 2020-09-29 VITALS
HEART RATE: 69 BPM | DIASTOLIC BLOOD PRESSURE: 79 MMHG | TEMPERATURE: 97.6 F | SYSTOLIC BLOOD PRESSURE: 181 MMHG | RESPIRATION RATE: 20 BRPM

## 2020-09-29 DIAGNOSIS — M86.179 OTHER ACUTE OSTEOMYELITIS OF FOOT, UNSPECIFIED LATERALITY (HCC): ICD-10-CM

## 2020-09-29 LAB
ANION GAP SERPL CALCULATED.3IONS-SCNC: 13 MEQ/L (ref 9–15)
BUN BLDV-MCNC: 19 MG/DL (ref 8–23)
CALCIUM SERPL-MCNC: 9.5 MG/DL (ref 8.5–9.9)
CHLORIDE BLD-SCNC: 102 MEQ/L (ref 95–107)
CO2: 24 MEQ/L (ref 20–31)
CREAT SERPL-MCNC: 0.76 MG/DL (ref 0.5–0.9)
GFR AFRICAN AMERICAN: >60
GFR NON-AFRICAN AMERICAN: >60
GLUCOSE BLD-MCNC: 156 MG/DL (ref 70–99)
HCT VFR BLD CALC: 36.3 % (ref 37–47)
HEMOGLOBIN: 11.5 G/DL (ref 12–16)
MCH RBC QN AUTO: 25.1 PG (ref 27–31.3)
MCHC RBC AUTO-ENTMCNC: 31.7 % (ref 33–37)
MCV RBC AUTO: 79.1 FL (ref 82–100)
PDW BLD-RTO: 15.3 % (ref 11.5–14.5)
PLATELET # BLD: 447 K/UL (ref 130–400)
POTASSIUM SERPL-SCNC: 4.6 MEQ/L (ref 3.4–4.9)
RBC # BLD: 4.59 M/UL (ref 4.2–5.4)
SODIUM BLD-SCNC: 139 MEQ/L (ref 135–144)
WBC # BLD: 6.9 K/UL (ref 4.8–10.8)

## 2020-09-29 PROCEDURE — G8417 CALC BMI ABV UP PARAM F/U: HCPCS | Performed by: INTERNAL MEDICINE

## 2020-09-29 PROCEDURE — 99213 OFFICE O/P EST LOW 20 MIN: CPT | Performed by: INTERNAL MEDICINE

## 2020-09-29 PROCEDURE — 11042 DBRDMT SUBQ TIS 1ST 20SQCM/<: CPT

## 2020-09-29 PROCEDURE — G8428 CUR MEDS NOT DOCUMENT: HCPCS | Performed by: INTERNAL MEDICINE

## 2020-09-29 PROCEDURE — 1036F TOBACCO NON-USER: CPT | Performed by: INTERNAL MEDICINE

## 2020-09-29 PROCEDURE — 1111F DSCHRG MED/CURRENT MED MERGE: CPT | Performed by: INTERNAL MEDICINE

## 2020-09-29 PROCEDURE — 3017F COLORECTAL CA SCREEN DOC REV: CPT | Performed by: INTERNAL MEDICINE

## 2020-09-29 PROCEDURE — 3046F HEMOGLOBIN A1C LEVEL >9.0%: CPT | Performed by: INTERNAL MEDICINE

## 2020-09-29 PROCEDURE — 2022F DILAT RTA XM EVC RTNOPTHY: CPT | Performed by: INTERNAL MEDICINE

## 2020-09-29 ASSESSMENT — ENCOUNTER SYMPTOMS
RESPIRATORY NEGATIVE: 1
GASTROINTESTINAL NEGATIVE: 1

## 2020-09-29 NOTE — PROGRESS NOTES
sloughing    Sulfa Antibiotics     Trimethoprim Itching and Other (See Comments)     Other reaction(s): Other: See Comments  Burning and redness in eye(eye drop)  Burning and redness in eye(eye drop)         MEDICATIONS    Current Outpatient Medications on File Prior to Encounter   Medication Sig Dispense Refill    fluconazole (DIFLUCAN) 150 MG tablet Take one dose now. Repeat in 3 days if symptoms persists. 2 tablet 0    atorvastatin (LIPITOR) 20 MG tablet Take 1 tablet by mouth daily 30 tablet 3    ertapenem (INVANZ) infusion Infuse 1,000 mg intravenously every 24 hours Compound per protocol. 45720 mg 0    insulin glargine (LANTUS) 100 UNIT/ML injection vial Inject 14 Units into the skin nightly      glipiZIDE (GLUCOTROL) 5 MG tablet TAKE 1 TABLET BY MOUTH ONCE DAILY BEFORE A MEAL      lisinopril (PRINIVIL;ZESTRIL) 20 MG tablet TAKE 1 TABLET BY MOUTH ONCE DAILY 30 tablet 3    metFORMIN (GLUCOPHAGE) 1000 MG tablet Take 2 tablets by mouth 2 times daily (with meals) (Patient taking differently: Take 1,000 mg by mouth 2 times daily (with meals) ) 60 tablet 3    mupirocin (BACTROBAN) 2 % ointment Apply 22 g topically 3 times daily Apply topically 3 times daily.  albuterol (PROVENTIL) (2.5 MG/3ML) 0.083% nebulizer solution Take 3 mLs by nebulization every 6 hours as needed for Wheezing or Shortness of Breath 120 each 3     No current facility-administered medications on file prior to encounter. REVIEW OF SYSTEMS    Pertinent items are noted in HPI. Objective:      BP (!) 181/79   Pulse 69   Temp 97.6 °F (36.4 °C) (Temporal)   Resp 20     Wt Readings from Last 3 Encounters:   09/15/20 247 lb (112 kg)   09/04/20 250 lb 14.4 oz (113.8 kg)   08/11/20 250 lb (113.4 kg)       PHYSICAL EXAM    Constitutional:   Well nourished and well developed. Appears neat and clean. Patient is alert, oriented x3, and in no apparent distress.      Respiratory:  Respiratory effort is easy and symmetric bilaterally. Rate is normal at rest and on room air. Vascular:  Pedal Pulses is palpable and audible signal noted with doppler. Capillary refill is <5 sec to digits bilateral.  Extremities negative for pitting edema. Neurological:  Gross and Light touch intact. Protective sensation absent    Dermatological:  Wound description noted in wound assessment. The surtures are very loose with extensive hyperkeratotic tissue around them. Upon debridement the incision is open but with a healthy granular base and no tunneling noted. Surtures were removed. Psychiatric:  Judgement and insight intact. Short and long term memory intact. No evidence of depression, anxiety, or agitation. Patient is calm, cooperative, and communicative. Appropriate interactions and affect. Assessment:      Problem List Items Addressed This Visit     None           Procedure Note  Indications:  Based on my examination of this patient's wound(s)/ulcer(s) today, debridement is  required to promote healing and evaluate the wound base. Patient Active Problem List   Diagnosis Code    Cellulitis of foot L03.119    Open wound of left foot S80.80A    DM type 2 (diabetes mellitus, type 2) ([address] E11.9    Hypertension I10    YESENIA (acute kidney injury) ([address] N17.9    Non-pressure chronic ulcer of other part of left foot with fat layer exposed ([address] L97.522    Cellulitis L03.90    Pyogenic inflammation of bone (HCC) M86.9    Pre-operative clearance Z01.818    S/P foot surgery, left Z98.890        Procedure Note  Indications:  Based on my examination of this patient's wound(s)/ulcer(s) today, debridement is required to promote healing and evaluate the wound base.     Performed by: Lucie Manzano DPM    Consent obtained:  Yes    Time out taken:  Yes    Pain Control:         Debridement:Excisional Debridement    Using curette and tissue nippers the wound(s)/ulcer(s) was/were sharply debrided down through and including the removal of epidermis, dermis and subcutaneous tissue. Devitalized Tissue Debrided:  callus    Pre Debridement Measurements:  Are located in the Wound/Ulcer Documentation Flow Sheet    Wound/Ulcer #: 1    Post Debridement Measurements:  Wound/Ulcer Descriptions are Pre Debridement except measurements:    Percent of Wound/Ulcer Debrided: 100%    Total Surface Area Debrided:  1.25 sq cm     Diabetic/Pressure/Non Pressure Ulcers:  Ulcer: Non-Pressure ulcer, fat layer exposed      Bleeding:  Minimal    Hemostasis Achieved:  by pressure    Procedural Pain:  0  / 10     Post Procedural Pain:  0 / 10     Response to treatment:  Well tolerated by patient. Wound 08/02/20 Foot Left;Lateral (Active)   Wound Image   09/29/20 1519   Wound Diabetic 09/29/20 1442   Offloading for Diabetic Foot Ulcers Felt or foam;Forefoot offloading shoe 09/18/20 1202   Dressing Status Clean;Dry; Intact 09/02/20 2135   Dressing Changed Dressing reinforced 08/31/20 0857   Dressing/Treatment Collagen with Ag;Alginate;Dry dressing 09/29/20 1519   Wound Cleansed Rinsed/Irrigated with saline 09/29/20 1442   Wound Length (cm) 1 cm 09/18/20 1053   Wound Width (cm) 0.2 cm 09/18/20 1053   Wound Depth (cm) 1.8 cm 09/18/20 1053   Wound Surface Area (cm^2) 0.2 cm^2 09/18/20 1053   Change in Wound Size % (l*w) 95.24 09/18/20 1053   Wound Volume (cm^3) 0.36 cm^3 09/18/20 1053   Wound Healing % -800 09/18/20 1053   Post-Procedure Length (cm) 0.5 cm 09/29/20 1506   Post-Procedure Width (cm) 2.5 cm 09/29/20 1506   Post-Procedure Depth (cm) 0.4 cm 09/29/20 1506   Post-Procedure Surface Area (cm^2) 1.25 cm^2 09/29/20 1506   Post-Procedure Volume (cm^3) 0.5 cm^3 09/29/20 1506   Wound Assessment Edges well approximated;Chinchilla 09/11/20 1018   Drainage Amount Scant 09/29/20 1442   Drainage Description Serosanguinous 09/29/20 1442   Odor None 09/29/20 1442   Margins Defined edges 09/29/20 1442   Pema-wound Assessment Dry 09/29/20 1442   Chinchilla%Wound Bed 100 09/18/20 1053   Number of days: 58         Plan:   Patient examined and debrided. Follow up with Dr. Heri Prakash  Culture reviewed  jm packing every other day. Follow up in  2 weeks  Shoe off loaded. Plastizote was noted to be bottomed outagain          Treatment Note please see attached Discharge Instructions    Written patient dismissal instructions given to patient and signed by patient or POA. Discharge [address] and Hyperbaric Medicine   Physician Orders and Discharge [address], [address] [place] [address]  Telephone: [phone]      [phone]        NAME: Rex Burnham  DATE OF BIRTH:  1960  MEDICAL RECORD NUMBER:  [id number]     Home Care/Facility: [facility] [address]      Wound Location:   LEFT FOOT  Dressing orders: 1. Cleanse wound(s) with normal saline. 2. GENTLY PACK WOUND WITH MJ   3. COVER WITH CALCIUM ALGINATE AND DRY DRESSING. 4. CHANGE DRESSING EVERY OTHER DAY.     Compression:  NONE     Offloading Device:  PATIENT TO WEAR A FOREFOOT OFFLOADING SHOE WITH FELTED FOAM INSERTS, PLACE A PILLOW UNDER LEFT LEG WHILE SLEEPING.     Other Instructions: BRING INSOLES TO NEXT APPOINTMENT ,  MOISTURIZE RIGHT FOOT TWO TIMES A DAY, Keep all dressings clean, dry and intact.  Keep pressure off the wound(s) at all times.      Follow up visit   2 WEEKS  October 13, 2020 AT    1:45PM     Please give 24 hour notice if unable to keep appointment.  [phone]     If you experience any of the following, please call the Wound Care Service at  [phone] or go to the nearest emergency room.        *Increase in pain         *Temperature over 101           *Increase in drainage from your wound or a foul odor  *Uncontrolled swelling            *Need for compression bandage changes due to slippage, breakthrough drainage       PLEASE NOTE: IF YOU ARE UNABLE TO OBTAIN WOUND SUPPLIES, CONTINUE TO USE THE SUPPLIES YOU HAVE AVAILABLE UNTIL YOU ARE ABLE TO REACH US.  IT IS MOST IMPORTANT TO KEEP THE WOUND COVERED AT ALL TIMES  Electronically signed by Lori Platt DPM on 9/29/2020 at 3:14 PM          Electronically signed by Lori Platt DPM on 9/29/2020 at 5:14 PM

## 2020-09-29 NOTE — PROGRESS NOTES
Infectious Disease     Patient Name: Eloisa Gan  Date: 9/29/2020  YOB: 1960  Medical Record Number: [id number]      Eloisa Gan is a 56 y.o. female being evaluated by a Virtual Visit (video visit) encounter to address concerns as mentioned above. A caregiver was present when appropriate. Due to this being a TeleHealth encounter (During [facility]- public health emergency), evaluation of the following organ systems was limited: Vitals/Constitutional/EENT/Resp/CV/GI//MS/Neuro/Skin/Heme-Lymph-Imm. Pursuant to the emergency declaration under the [address] [place] [address] authority and the [employer] Act, this Virtual Visit was conducted with patient's (and/or legal guardian's) consent, to reduce the patient's risk of exposure to COVID-19 and provide necessary medical care. The patient (and/or legal guardian) has also been advised to contact this office for worsening conditions or problems, and seek emergency medical treatment and/or call 911 if deemed necessary. Patient identification was verified at the start of the visit: Yes    Total time spent for this encounter: 15min    Services were provided through a video synchronous discussion virtually to substitute for in-person clinic visit. Patient and provider were located at their individual homes. --Mike Stratton MD on 9/29/2020 at 10:25 AM    An electronic signature was used to authenticate this note.         History of Present Illness:  Osteomyelitis left foot        Patient presented 8/30/2020 with red swollen painful L foot  [place] with open foot wound  Patient had been receiving oral antibiotics  White blood cell count was 20,000     Patient had been hospitalized in early August with diabetic foot infection initially treated with vancomycin meropenem  MRI not show evidence of osteomyelitis at that time  Patient was discharged on oral doxycycline  Patient never followed up with me as outpatient     Cultures from 8/30/2020 of the LEFT foot show streptococci coagulase-negative staphylococci anaerobic bacteria        Patient went to surgery  9/2/2020 incision and drainage left fifth metatarsal head with resection            Review of Systems   Constitutional: Negative for chills, diaphoresis, fatigue and fever. Respiratory: Negative. Cardiovascular: Negative. Gastrointestinal: Negative. Musculoskeletal: Negative. Patient is able to show me her foot showing no redness warmth swelling no drainage          Social History     Tobacco Use    Smoking status: Never Smoker    Smokeless tobacco: Never Used   Substance Use Topics    Alcohol use: Yes     Comment: socailly     Drug use: Never         Past Medical History:   Diagnosis Date    Depression     DM type 2 (diabetes mellitus, type 2) [facility]     Hyperlipidemia     Hypertension     SVT (supraventricular tachycardia) ([facility])            Past Surgical History:   Procedure Laterality Date    ABLATION OF DYSRHYTHMIC FOCUS      EYE SURGERY      FOOT DEBRIDEMENT Left 9/2/2020    INCISION AND DRAINAGE LEFT FIFTH METATARSAL HEAD RESECTION performed by Wes Fleming DPM at [address] [facility] Right          Current Outpatient Medications on File Prior to Visit   Medication Sig Dispense Refill    fluconazole (DIFLUCAN) 150 MG tablet Take one dose now. Repeat in 3 days if symptoms persists. 2 tablet 0    atorvastatin (LIPITOR) 20 MG tablet Take 1 tablet by mouth daily 30 tablet 3    ertapenem (INVANZ) infusion Infuse 1,000 mg intravenously every 24 hours Compound per protocol.  06445 mg 0    insulin glargine (LANTUS) 100 UNIT/ML injection vial Inject 14 Units into the skin nightly      glipiZIDE (GLUCOTROL) 5 MG tablet TAKE 1 TABLET BY MOUTH ONCE DAILY BEFORE A MEAL      lisinopril (PRINIVIL;ZESTRIL) 20 MG tablet TAKE 1 TABLET BY

## 2020-09-29 NOTE — CODE DOCUMENTATION
[id number] Physician Billing Sheet. Melinda Burnham  AGE: 56 y.o.    GENDER: female  : 1960  TODAY'S DATE:  2020    ICD-10 CODES  Active Hospital Problems    Diagnosis Date Noted    S/P foot surgery, left [Z98.890] 2020    Non-pressure chronic ulcer of other part of left foot with fat layer exposed ([facility] [L97.522] 2020    DM type 2 (diabetes mellitus, type 2) ([facility] [E11.9]        PHYSICIAN PROCEDURES    CPT CODE  91900 Modf 58      Electronically signed by Fabiano Haddad DPM on 2020 at 3:17 PM

## 2020-10-06 ENCOUNTER — HOSPITAL ENCOUNTER (EMERGENCY)
Age: 60
Discharge: HOME | End: 2020-10-06
Payer: COMMERCIAL

## 2020-10-06 ENCOUNTER — TELEPHONE (OUTPATIENT)
Dept: INFECTIOUS DISEASES | Age: 60
End: 2020-10-06

## 2020-10-06 VITALS
HEIGHT: 66 IN | OXYGEN SATURATION: 95 % | DIASTOLIC BLOOD PRESSURE: 95 MMHG | RESPIRATION RATE: 18 BRPM | SYSTOLIC BLOOD PRESSURE: 163 MMHG | BODY MASS INDEX: 39.7 KG/M2 | WEIGHT: 247 LBS | TEMPERATURE: 97.5 F | HEART RATE: 65 BPM

## 2020-10-06 PROCEDURE — 2580000003 HC RX 258: Performed by: PERSONAL EMERGENCY RESPONSE ATTENDANT

## 2020-10-06 PROCEDURE — 99283 EMERGENCY DEPT VISIT LOW MDM: CPT

## 2020-10-06 PROCEDURE — 96365 THER/PROPH/DIAG IV INF INIT: CPT

## 2020-10-06 PROCEDURE — 99282 EMERGENCY DEPT VISIT SF MDM: CPT

## 2020-10-06 PROCEDURE — 6360000002 HC RX W HCPCS: Performed by: PERSONAL EMERGENCY RESPONSE ATTENDANT

## 2020-10-06 RX ADMIN — ERTAPENEM SODIUM 1000 MG: 1 INJECTION, POWDER, LYOPHILIZED, FOR SOLUTION INTRAMUSCULAR; INTRAVENOUS at 19:22

## 2020-10-06 ASSESSMENT — ENCOUNTER SYMPTOMS
DIARRHEA: 0
NAUSEA: 0
SHORTNESS OF BREATH: 0
SORE THROAT: 0
ABDOMINAL PAIN: 0
VOMITING: 0
COLOR CHANGE: 0
COUGH: 0
BLOOD IN STOOL: 0
RHINORRHEA: 0

## 2020-10-06 NOTE — TELEPHONE ENCOUNTER
Recd call from Nurse Alex Velasquez -stating Ms Feliz Romberg has one lumen blocked of the PICC line and bleeding at the site  Patient is on IV ertapenem QD until 10-20-20 attentively     Dr. Svetlana Ferrell NOT available. Paged Dr. Jagjit Umana  Per Dr. Jagjit Umana:  Repair or Replace PICC line. Called [employer]. Left vm- waiting for return call. Patient is able to have PICC repair or replacement any day at any time.

## 2020-10-06 NOTE — ED PROVIDER NOTES
[address] [facility] ED  eMERGENCY dEPARTMENT eNCOUnter      Pt Name: Raoul Brown  MRN: [id number]  [place] 1960  Date of evaluation: 10/6/2020  Provider: KENJI Melara      HISTORY OF PRESENT ILLNESS    Raoul Brown is a 56 y.o. female with PMHx of DM2, HTN, osteomyelitis, depression, hyperlipidemia, SVT presents to the emergency department with PICC line. Patient is receiving ertapenem daily for osteomyelitis of foot. She has a PICC line in right upper arm. She states the nurse was changing the dressing today, when she is unsure if she cut part of the tubing with her scissors, but started with bleeding around site. Patient states she did try to flush her PICC line and it started to leak around the site. She denies any fevers, chills, chest pain, shortness of breath, pain to the site. HPI    Nursing Notes were reviewed. REVIEW OF SYSTEMS       Review of Systems   Constitutional: Negative for appetite change, chills and fever. HENT: Negative for congestion, rhinorrhea and sore throat. Respiratory: Negative for cough and shortness of breath. Cardiovascular: Negative for chest pain. Gastrointestinal: Negative for abdominal pain, blood in stool, diarrhea, nausea and vomiting. Genitourinary: Negative for difficulty urinating. Musculoskeletal: Negative for neck stiffness. Skin: Negative for color change and rash. Neurological: Negative for dizziness, syncope, weakness, light-headedness, numbness and headaches. All other systems reviewed and are negative.             PAST MEDICAL HISTORY     Past Medical History:   Diagnosis Date    Depression     DM type 2 (diabetes mellitus, type 2) ([facility])     Hyperlipidemia     Hypertension     SVT (supraventricular tachycardia) ([facility])          SURGICAL HISTORY       Past Surgical History:   Procedure Laterality Date    ABLATION OF DYSRHYTHMIC FOCUS      EYE SURGERY      FOOT DEBRIDEMENT Left 9/2/2020 session: None    Stress: None   Relationships    Social connections     Talks on phone: None     Gets together: None     Attends [employer] service: None     Active member of club or organization: None     Attends meetings of clubs or organizations: None     Relationship status: None    Intimate partner violence     Fear of current or ex partner: None     Emotionally abused: None     Physically abused: None     Forced sexual activity: None   Other Topics Concern    None   Social History Narrative    None         PHYSICAL EXAM         ED Triage Vitals [10/06/20 1720]   BP Temp Temp Source Pulse Resp SpO2 Height Weight   (!) 165/89 97.5 °F (36.4 °C) Temporal 74 18 95 % 5' 6\" (1.676 m) 247 lb (112 kg)       Physical Exam  Constitutional:       Appearance: She is well-developed. HENT:      Head: Normocephalic and atraumatic. Eyes:      Conjunctiva/sclera: Conjunctivae normal.      Pupils: Pupils are equal, round, and reactive to light. Neck:      Musculoskeletal: Normal range of motion and neck supple. Trachea: No tracheal deviation. Cardiovascular:      Heart sounds: Normal heart sounds. Pulmonary:      Effort: Pulmonary effort is normal. No respiratory distress. Breath sounds: Normal breath sounds. No stridor. Abdominal:      General: Bowel sounds are normal. There is no distension. Palpations: Abdomen is soft. There is no mass. Tenderness: There is no abdominal tenderness. There is no guarding or rebound. Musculoskeletal: Normal range of motion. Skin:     General: Skin is warm and dry. Capillary Refill: Capillary refill takes less than 2 seconds. Findings: No rash. Comments: PICC line to right upper arm. Blood noted on dressing. PICC line removed and no bleeding or trauma noted to PICC line insertion site on patient's arm. No tenderness to palpation, no erythema. Neurological:      Mental Status: She is alert and oriented to person, place, and time. Deep Tendon Reflexes: Reflexes are normal and symmetric. Psychiatric:         Behavior: Behavior normal.         Thought Content: Thought content normal.         Judgment: Judgment normal.         DIAGNOSTIC RESULTS     EKG:All EKG's are interpreted by the Emergency Department Physician who either signs or Co-signs this chart in the absence of a cardiologist.        RADIOLOGY:   Non-plain film images such as CT, Ultrasound and MRI are read by theradiologist. Plain radiographic images are visualized and preliminarily interpreted by the emergency physician with the below findings:    Interpretation per theRadiologist below, if available at the time of this note:    No orders to display           LABS:  Labs Reviewed - No data to display    All other labs were within normal range or not returned as of this dictation. EMERGENCY DEPARTMENT COURSE and DIFFERENTIAL DIAGNOSIS/MDM:   Vitals:    Vitals:    10/06/20 1720   BP: (!) 165/89   Pulse: 74   Resp: 18   Temp: 97.5 °F (36.4 °C)   TempSrc: Temporal   SpO2: 95%   Weight: 247 lb (112 kg)   Height: 5' 6\" (1.676 m)         MDM    PICC line was removed. Tip present on PICC line. No obvious damage to PICC line tubing. IV started, patient will receive her daily antibiotics that she did not receive today. She states she is getting another PICC line tomorrow. Standard anticipatory guidance given to patient upon discharge. Have given them a specific time frame in which to follow-up and who to follow-up with. I have also advised them that they should return to the emergency department if they get worse, or not getting better or develop any new or concerning symptoms. Patient demonstrates understanding. CRITICAL CARE TIME   Total Critical Caretime was 0 minutes, excluding separately reportable procedures. There was a high probability of clinically significant/life threatening deterioration in the patient's condition which required my urgent intervention.

## 2020-10-06 NOTE — ED TRIAGE NOTES
Pt presents to ED from home with c/o vascular access issue. Pt states that home health nurse was redressing her PICC line and accidentally pulled it out \"2-3 cm\". This RN attempted to flush PICC w/NS and noted fluid draining around the PICC insertion site. Upon assessment, pt is A/Ox4, skin p/w/d, resp even and unlabored, msp's intact. Pt denies cp, sob, n/v/d, fever, or chills. Pt states that she has PICC line for IV antibiotics for wound infection.

## 2020-10-07 ENCOUNTER — HOSPITAL ENCOUNTER (OUTPATIENT)
Dept: INTERVENTIONAL RADIOLOGY/VASCULAR | Age: 60
Discharge: HOME | End: 2020-10-09
Payer: COMMERCIAL

## 2020-10-07 PROCEDURE — 2500000003 HC RX 250 WO HCPCS: Performed by: INTERNAL MEDICINE

## 2020-10-07 PROCEDURE — 2580000003 HC RX 258: Performed by: INTERNAL MEDICINE

## 2020-10-07 PROCEDURE — 2709999900 IR PICC WO SQ PORT/PUMP > 5 YEARS

## 2020-10-07 PROCEDURE — 36573 INSJ PICC RS&I 5 YR+: CPT

## 2020-10-07 RX ORDER — LIDOCAINE HYDROCHLORIDE 20 MG/ML
5 INJECTION, SOLUTION INFILTRATION; PERINEURAL ONCE
Status: COMPLETED | OUTPATIENT
Start: 2020-10-07 | End: 2020-10-07

## 2020-10-07 RX ORDER — SODIUM CHLORIDE 9 MG/ML
250 INJECTION, SOLUTION INTRAVENOUS ONCE
Status: COMPLETED | OUTPATIENT
Start: 2020-10-07 | End: 2020-10-07

## 2020-10-07 RX ORDER — SODIUM CHLORIDE 0.9 % (FLUSH) 0.9 %
10 SYRINGE (ML) INJECTION EVERY 12 HOURS SCHEDULED
Status: DISCONTINUED | OUTPATIENT
Start: 2020-10-07 | End: 2020-10-10 | Stop reason: HOSPADM

## 2020-10-07 RX ORDER — SODIUM CHLORIDE 0.9 % (FLUSH) 0.9 %
10 SYRINGE (ML) INJECTION PRN
Status: DISCONTINUED | OUTPATIENT
Start: 2020-10-07 | End: 2020-10-10 | Stop reason: HOSPADM

## 2020-10-07 RX ADMIN — LIDOCAINE HYDROCHLORIDE 5 ML: 20 INJECTION, SOLUTION INFILTRATION; PERINEURAL at 15:38

## 2020-10-07 RX ADMIN — SODIUM CHLORIDE 250 ML: 9 INJECTION, SOLUTION INTRAVENOUS at 15:37

## 2020-10-07 ASSESSMENT — PAIN SCALES - GENERAL: PAINLEVEL_OUTOF10: 0

## 2020-10-07 NOTE — TELEPHONE ENCOUNTER
10-7-2020  8:40 am  Yanet returned call from [facility] today at 3 pm for PICC line replacement  Spoke with Ms Hwangann Mark at [phone]  Stated she is available today at 3:oo to have PICC replaced. PICC was removed yesterday evening at [address] [facility].

## 2020-10-07 NOTE — ED NOTES
Pt discharged in stable condition. All questions answered and education provided.        Cindy Serna RN  10/06/20 2004

## 2020-10-13 ENCOUNTER — HOSPITAL ENCOUNTER (OUTPATIENT)
Dept: WOUND CARE | Age: 60
Discharge: HOME | End: 2020-10-13
Payer: COMMERCIAL

## 2020-10-13 PROCEDURE — 11042 DBRDMT SUBQ TIS 1ST 20SQCM/<: CPT

## 2020-10-13 NOTE — PROGRESS NOTES
Norma Schwartz [facility]   Progress Note and Procedure Note      [address] [facility] [address] [address] RECORD NUMBER:  [id number]  AGE: 56 y.o. GENDER: female  : 1960  EPISODE DATE:  10/13/2020    Subjective:     Chief Complaint   Patient presents with    Wound Check         HISTORY of PRESENT ILLNESS HPI     Garry Kirby is a 56 y.o. female who presents today for wound/ulcer evaluation. History of Wound Context: Patient presents s/p excision of DM ulcer with Metatarsal head resection on . She is currently on IV abx. She has an area that is draining and [facility] put alginate on it.  Patientis on IV abx for 3 more weeks per Dr. Debi Hamilton Pain Timing/Severity: none  Quality of pain: N/A  Severity:  0 / 10   Modifying Factors: None  Associated Signs/Symptoms: edema    Ulcer Identification:  Ulcer Type: diabetic, pressure and neuropathic  Contributing Factors: edema, diabetes, poor glucose control, chronic pressure and obesity    Wound: N/A        PAST MEDICAL HISTORY        Diagnosis Date    Depression     DM type 2 (diabetes mellitus[facility] type 2) ([facility])     Hyperlipidemia     Hypertension     SVT (supraventricular tachycardia) ([facility])        PAST SURGICAL HISTORY    Past Surgical History:   Procedure Laterality Date    ABLATION OF DYSRHYTHMIC FOCUS      EYE SURGERY      FOOT DEBRIDEMENT Left 2020    INCISION AND DRAINAGE LEFT FIFTH METATARSAL HEAD RESECTION performed by Wes Fleming DPM at [address] [place] [address] FOOT SURGERY Right        FAMILY HISTORY    Family History   Family history unknown: Yes       SOCIAL HISTORY    Social History     Tobacco Use    Smoking status: Never Smoker    Smokeless tobacco: Never Used   Substance Use Topics    Alcohol use: Yes     Comment: socailly     Drug use: Never       ALLERGIES    Allergies   Allergen Reactions    Cat Hair Extract      itching, ankle and hands    Penicillins     Statins Other (See Comments)     Skin sloughing    Sulfa Antibiotics     Trimethoprim Itching and Other (See Comments)     Other reaction(s): Other: See Comments  Burning and redness in eye(eye drop)  Burning and redness in eye(eye drop)         MEDICATIONS    Current Outpatient Medications on File Prior to Encounter   Medication Sig Dispense Refill    fluconazole (DIFLUCAN) 150 MG tablet Take one dose now. Repeat in 3 days if symptoms persists. 2 tablet 0    atorvastatin (LIPITOR) 20 MG tablet Take 1 tablet by mouth daily 30 tablet 3    ertapenem (INVANZ) infusion Infuse 1,000 mg intravenously every 24 hours Compound per protocol. 21906 mg 0    insulin glargine (LANTUS) 100 UNIT/ML injection vial Inject 14 Units into the skin nightly      glipiZIDE (GLUCOTROL) 5 MG tablet TAKE 1 TABLET BY MOUTH ONCE DAILY BEFORE A MEAL      lisinopril (PRINIVIL;ZESTRIL) 20 MG tablet TAKE 1 TABLET BY MOUTH ONCE DAILY 30 tablet 3    metFORMIN (GLUCOPHAGE) 1000 MG tablet Take 2 tablets by mouth 2 times daily (with meals) (Patient taking differently: Take 1,000 mg by mouth 2 times daily (with meals) ) 60 tablet 3    albuterol (PROVENTIL) (2.5 MG/3ML) 0.083% nebulizer solution Take 3 mLs by nebulization every 6 hours as needed for Wheezing or Shortness of Breath 120 each 3    mupirocin (BACTROBAN) 2 % ointment Apply 22 g topically 3 times daily Apply topically 3 times daily. No current facility-administered medications on file prior to encounter. REVIEW OF SYSTEMS    Pertinent items are noted in HPI. Objective: There were no vitals taken for this visit. Wt Readings from Last 3 Encounters:   10/06/20 247 lb (112 kg)   09/15/20 247 lb (112 kg)   09/04/20 250 lb 14.4 oz (113.8 kg)       PHYSICAL EXAM    Constitutional:   Well nourished and well developed. Appears neat and clean. Patient is alert, oriented x3, and in no apparent distress. Respiratory:  Respiratory effort is easy and symmetric bilaterally. Rate is normal at rest and on room air.     Vascular:  Pedal Pulses is palpable and audible signal noted with doppler. Capillary refill is <5 sec to digits bilateral.  Extremities negative for pitting edema. Neurological:  Gross and Light touch intact. Protective sensation absent    Dermatological:  Wound description noted in wound assessment. The wound is improving in size and depth. A healthy granular base is noted. The kassidy wound area is intact without any erythema or warmth noted. No signs or symptoms of acute infection noted. Psychiatric:  Judgement and insight intact. Short and long term memory intact. No evidence of depression, anxiety, or agitation. Patient is calm, cooperative, and communicative. Appropriate interactions and affect. Assessment:      Problem List Items Addressed This Visit     None           Procedure Note  Indications:  Based on my examination of this patient's wound(s)/ulcer(s) today, debridement is  required to promote healing and evaluate the wound base. 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 Performed by: Ameya Smith DPM    Consent obtained:  Yes    Time out taken:  Yes    Pain Control:         Debridement:Excisional Debridement    Using curette and tissue nippers the wound(s)/ulcer(s) was/were sharply debrided down through and including the removal of epidermis, dermis and subcutaneous tissue.         Devitalized Tissue Debrided:  callus    Pre Debridement Measurements:  Are located in the [place]  Documentation Flow Sheet    Wound/Ulcer #: 1    Post Debridement Measurements:  Wound/Ulcer Descriptions are Pre Debridement except measurements:    Percent of Wound/Ulcer Debrided: 100%    Total Surface Area Debrided:  0.54 sq cm     Diabetic/Pressure/Non Pressure Ulcers:  Ulcer: Non-Pressure ulcer, fat layer exposed      Bleeding:  Minimal    Hemostasis Achieved:  by pressure    Procedural Pain:  0  / 10     Post Procedural Pain:  0 / 10     Response to treatment:  Well tolerated by patient. Wound 08/02/20 Foot Left;Lateral #1 (Active)   Wound Image   10/13/20 1421   Wound Etiology Diabetic 10/13/20 1421   Wound Cleansed Cleansed with saline 10/13/20 1421   Dressing/Treatment Collagen with Ag;Alginate;Dry dressing 09/29/20 1519   Offloading for Diabetic Foot Ulcers Felt or foam;Forefoot offloading shoe 09/18/20 1202   Wound Length (cm) 0.3 cm 10/13/20 1421   Wound Width (cm) 1.8 cm 10/13/20 1421   Wound Depth (cm) 0.1 cm 10/13/20 1421   Wound Surface Area (cm^2) 0.54 cm^2 10/13/20 1421   Change in Wound Size % (l*w) 87.14 10/13/20 1421   Wound Volume (cm^3) 0.05 cm^3 10/13/20 1421   Wound Healing % -25 10/13/20 1421   Post-Procedure Length (cm) 0.3 cm 10/13/20 1447   Post-Procedure Width (cm) 1.8 cm 10/13/20 1447   Post-Procedure Depth (cm) 0.2 cm 10/13/20 1447   Post-Procedure Surface Area (cm^2) 0.54 cm^2 10/13/20 1447   Post-Procedure Volume (cm^3) 0.11 cm^3 10/13/20 1447   Drainage Amount Scant 10/13/20 1421   Drainage Description Serous 10/13/20 1421   Odor None 10/13/20 1421   Pema-wound Assessment Hyperkeratosis (callous) 10/13/20 1421   Margins Attached edges 10/13/20 1421   Wound Thickness Description not for Pressure Injury Full thickness 10/13/20 1421   Number of days: 72         Plan:   Patient examined and debrided. Follow up with Dr. Brandon Flood packing every other day. Follow up in  2 weeks  Shoe off loaded.  Plastizote was noted to be bottomed outagain          Treatment Note please see attached Discharge Instructions    Written patient dismissal instructions given to patient and signed by patient or POA. Discharge [address] and [facility]   Physician Orders and Discharge [address], [address] [place] [address]  Telephone: [phone]      [phone]        NAME: Long Burnham  DATE OF BIRTH:  1960  MEDICAL RECORD NUMBER:  [id number]     Home Care/Facility: [facility] [address]      Wound Location:   LEFT FOOT  Dressing orders: 1. Cleanse wound(s) with normal saline. 2. GENTLY PACK WOUND WITH FEDE. MOISTEN WITH SALINE. 3. COVER WITH DRY DRESSING. 4. CHANGE DRESSING EVERY OTHER DAY.     Compression:  NONE     Offloading Device:  PATIENT TO WEAR A FOREFOOT OFFLOADING SHOE WITH FELTED FOAM INSERTS, PLACE A PILLOW UNDER LEFT LEG WHILE SLEEPING.     Other Instructions: MINIMAL AMBULATION,  MOISTURIZE RIGHT FOOT TWO TIMES A DAY, Keep all dressings clean, dry and intact.  Keep pressure off the wound(s) at all times.      Follow up visit   2 WEEKS  October 27, 2020 AT         Please give 24 hour notice if unable to keep appointment. [phone]     If you experience any of the following, please call the Wound Care Service at  [phone] or go to the nearest emergency room.        *Increase in pain         *Temperature over 101           *Increase in drainage from your wound or a foul odor  *Uncontrolled swelling            *Need for compression bandage changes due to slippage, breakthrough drainage       PLEASE NOTE: IF YOU ARE UNABLE TO OBTAIN WOUND SUPPLIES, CONTINUE TO USE THE SUPPLIES YOU HAVE AVAILABLE UNTIL YOU ARE ABLE TO [address].  IT IS MOST IMPORTANT TO KEEP THE WOUND COVERED AT ALL TIMES  Electronically signed by Shilpa Mancilla DPM on 10/13/2020 at 2:55 PM          Electronically signed by Macey Faye ИВАН Fleming DPM on 10/13/2020 at 2:57 PM

## 2020-10-13 NOTE — CODE DOCUMENTATION
[address] [facility] Physician Billing Sheet. Kan Colby  AGE: 56 y.o.    GENDER: female  : 1960  TODAY'S DATE:  10/13/2020    ICD-10 CODES  Active Hospital Problems    Diagnosis Date Noted    S/P foot surgery, left [Z98.890] 2020    Non-pressure chronic ulcer of other part of left foot with [facility] layer exposed [facility] [L97.522] 2020    Open wound of left foot [S91.302A] 2020    DM type 2 (diabetes mellitus, type 2) ([facility] [E11.9]        PHYSICIAN PROCEDURES    CPT CODE  05986 LT modf 58      Electronically signed by Roddy Ramirez DPM on 10/13/2020 at 2:59 PM

## 2020-10-20 ENCOUNTER — VIRTUAL VISIT (OUTPATIENT)
Dept: INFECTIOUS DISEASES | Age: 60
End: 2020-10-20
Payer: COMMERCIAL

## 2020-10-20 PROCEDURE — 99213 OFFICE O/P EST LOW 20 MIN: CPT | Performed by: INTERNAL MEDICINE

## 2020-10-20 RX ORDER — DOXYCYCLINE HYCLATE 100 MG
100 TABLET ORAL 2 TIMES DAILY
Qty: 60 TABLET | Refills: 1 | Status: SHIPPED | OUTPATIENT
Start: 2020-10-20 | End: 2020-11-19

## 2020-10-20 ASSESSMENT — ENCOUNTER SYMPTOMS
NAUSEA: 1
RESPIRATORY NEGATIVE: 1

## 2020-10-20 NOTE — PROGRESS NOTES
Infectious Disease     Patient Name: Shahid Leo  Date: 10/20/2020  YOB: 1960  Medical Record Number: [id number]              Shahid Leo is a 56 y.o. female being evaluated by a Virtual Visit (video visit) encounter to address concerns as mentioned above. A caregiver was present when appropriate. Due to this being a TeleHealth encounter (During OTVXE-48 public health emergency), evaluation of the following organ systems was limited: Vitals/Constitutional/EENT/Resp/CV/GI//MS/Neuro/Skin/Heme-Lymph-Imm. Pursuant to the emergency declaration under the [address] [place] [address], [address] [place] [address] authority and the [employer] Act, this Virtual Visit was conducted with patient's (and/or legal guardian's) consent, to reduce the patient's risk of exposure to COVID-19 and provide necessary medical care. The patient (and/or legal guardian) has also been advised to contact this office for worsening conditions or problems, and seek emergency medical treatment and/or call 911 if deemed necessary. Patient identification was verified at the start of the visit: Yes    Total time spent for this encounter: 15min    Services were provided through a video synchronous discussion virtually to substitute for in-person clinic visit. Patient and provider were located at their individual homes. --Ana Maria Louis MD on 10/20/2020 at 11:00 AM    An electronic signature was used to authenticate this note.         Osteomyelitis left foot           Patient presented 8/30/2020 with red swollen painful L foot  [place] with open foot wound  Patient had been receiving oral antibiotics  White blood cell count was 20,000     Patient had been hospitalized in early August with diabetic foot infection initially treated with vancomycin meropenem  MRI not show evidence of osteomyelitis at that time  Patient was discharged on oral doxycycline  Patient never followed up with me as outpatient     Cultures from 8/30/2020 of the LEFT foot show streptococci coagulase-negative staphylococci anaerobic bacteria        Patient went to surgery  9/2/2020 incision and drainage left fifth metatarsal head with resection     Osteomyelitis left foot  Invanz         Review of Systems   HENT: Negative. Respiratory: Negative. Cardiovascular: Negative. Gastrointestinal: Positive for nausea. Review of Systems: All 14 review of systems negative other than as stated above    Social History     Tobacco Use    Smoking status: Never Smoker    Smokeless tobacco: Never Used   Substance Use Topics    Alcohol use: Yes     Comment: socailly     Drug use: Never         Past Medical History:   Diagnosis Date    Depression     DM type 2 (diabetes mellitus, type 2) (HCC)     Hyperlipidemia     Hypertension     SVT [facility]            Past Surgical History:   Procedure Laterality Date    ABLATION OF DYSRHYTHMIC FOCUS      EYE SURGERY      FOOT DEBRIDEMENT Left 9/2/2020    INCISION AND DRAINAGE LEFT FIFTH METATARSAL HEAD RESECTION performed by Wes Fleming DPM at [address] [facility] Right          Current Outpatient Medications on File Prior to Visit   Medication Sig Dispense Refill    fluconazole (DIFLUCAN) 150 MG tablet Take one dose now. Repeat in 3 days if symptoms persists.  2 tablet 0    atorvastatin (LIPITOR) 20 MG tablet Take 1 tablet by mouth daily 30 tablet 3    insulin glargine (LANTUS) 100 UNIT/ML injection vial Inject 14 Units into the skin nightly      glipiZIDE (GLUCOTROL) 5 MG tablet TAKE 1 TABLET BY MOUTH ONCE DAILY BEFORE A MEAL      lisinopril (PRINIVIL;ZESTRIL) 20 MG tablet TAKE 1 TABLET BY MOUTH ONCE DAILY 30 tablet 3    metFORMIN (GLUCOPHAGE) 1000 MG tablet Take 2 tablets by mouth 2 times daily (with meals) (Patient taking differently: Take 1,000 mg by mouth 2 times daily (with

## 2020-10-27 ENCOUNTER — HOSPITAL ENCOUNTER (OUTPATIENT)
Dept: WOUND CARE | Age: 60
Discharge: HOME | End: 2020-10-27
Payer: COMMERCIAL

## 2020-10-27 VITALS
RESPIRATION RATE: 18 BRPM | SYSTOLIC BLOOD PRESSURE: 189 MMHG | TEMPERATURE: 97.3 F | HEART RATE: 78 BPM | DIASTOLIC BLOOD PRESSURE: 83 MMHG

## 2020-10-27 PROCEDURE — 99212 OFFICE O/P EST SF 10 MIN: CPT

## 2020-10-27 NOTE — PLAN OF CARE
Problem: Blood Glucose:  Goal: Ability to maintain appropriate glucose levels will improve  Description: Ability to maintain appropriate glucose levels will improve  Outcome: Ongoing

## 2020-10-27 NOTE — CODE DOCUMENTATION
[address] [facility] Physician Billing Sheet. Anastacio Sue  AGE: 56 y.o.    GENDER: female  : 1960  TODAY'S DATE:  10/27/2020    ICD-10 CODES  Active Hospital Problems    Diagnosis Date Noted    S/P foot surgery, left [Z98.890] 2020    Non-pressure chronic ulcer of other part of left foot with fat layer exposed [facility] [L97.522] 2020    Open wound of left foot [S91.302A] 2020    DM type 2 (diabetes mellitus, type 2) [facility] [E11.9]        PHYSICIAN PROCEDURES    CPT CODE  90994      Electronically signed by Erin Cortez DPM on 10/27/2020 at 2:25 PM

## 2020-10-27 NOTE — PROGRESS NOTES
Norma Schwartz 35   Progress Note and Procedure Note      [address] RECORD NUMBER:  [id number]  AGE: 56 y.o. GENDER: female  : 1960  EPISODE DATE:  10/27/2020    Subjective:     Chief Complaint   Patient presents with    Wound Check     left lat foot         HISTORY of PRESENT ILLNESS HPI     Lee Anne is a 56 y.o. female who presents today for wound/ulcer evaluation. History of Wound Context: Patient presents s/p excision of DM ulcer with Metatarsal head resection on . Wound/Ulcer Pain Timing/Severity: none  Quality of pain: N/A  Severity:  0 / 10   Modifying Factors: None  Associated Signs/Symptoms: edema    Ulcer Identification:  Ulcer Type: diabetic, pressure and neuropathic  Contributing Factors: edema, diabetes, poor glucose control, chronic pressure and obesity    Wound: N/A        PAST MEDICAL HISTORY        Diagnosis Date    Depression     DM type 2 (diabetes mellitus, type 2) (HCC)     Hyperlipidemia     Hypertension     SVT (supraventricular tachycardia) ([address]        PAST SURGICAL HISTORY    Past Surgical History:   Procedure Laterality Date    ABLATION OF DYSRHYTHMIC FOCUS      EYE SURGERY      FOOT DEBRIDEMENT Left 2020    INCISION AND DRAINAGE LEFT FIFTH METATARSAL HEAD RESECTION performed by Wes Fleming DPM at [address] [facility] Right        FAMILY HISTORY    Family History   Family history unknown: Yes       SOCIAL HISTORY    Social History     Tobacco Use    Smoking status: Never Smoker    Smokeless tobacco: Never Used   Substance Use Topics    Alcohol use: Yes     Comment: socailly     Drug use: Never       ALLERGIES    Allergies   Allergen Reactions    Cat Hair Extract      itching, ankle and hands    Penicillins     Statins Other (See Comments)     Skin sloughing    Sulfa Antibiotics     Trimethoprim Itching and Other (See Comments)     Other reaction(s):  Other: See Comments  Burning and redness in eye(eye drop)  Burning and redness in eye(eye drop)         MEDICATIONS    Current Outpatient Medications on File Prior to Encounter   Medication Sig Dispense Refill    doxycycline hyclate (VIBRA-TABS) 100 MG tablet Take 1 tablet by mouth 2 times daily 60 tablet 1    fluconazole (DIFLUCAN) 150 MG tablet Take one dose now. Repeat in 3 days if symptoms persists. 2 tablet 0    atorvastatin (LIPITOR) 20 MG tablet Take 1 tablet by mouth daily 30 tablet 3    insulin glargine (LANTUS) 100 UNIT/ML injection vial Inject 14 Units into the skin nightly      glipiZIDE (GLUCOTROL) 5 MG tablet TAKE 1 TABLET BY MOUTH ONCE DAILY BEFORE A MEAL      lisinopril (PRINIVIL;ZESTRIL) 20 MG tablet TAKE 1 TABLET BY MOUTH ONCE DAILY 30 tablet 3    metFORMIN (GLUCOPHAGE) 1000 MG tablet Take 2 tablets by mouth 2 times daily (with meals) (Patient taking differently: Take 1,000 mg by mouth 2 times daily (with meals) ) 60 tablet 3    albuterol (PROVENTIL) (2.5 MG/3ML) 0.083% nebulizer solution Take 3 mLs by nebulization every 6 hours as needed for Wheezing or Shortness of Breath 120 each 3    mupirocin (BACTROBAN) 2 % ointment Apply 22 g topically 3 times daily Apply topically 3 times daily. No current facility-administered medications on file prior to encounter. REVIEW OF SYSTEMS    Pertinent items are noted in HPI. Objective:      BP (!) 189/83   Pulse 78   Temp 97.3 °F (36.3 °C) (Temporal)   Resp 18     Wt Readings from Last 3 Encounters:   10/06/20 247 lb (112 kg)   09/15/20 247 lb (112 kg)   09/04/20 250 lb 14.4 oz (113.8 kg)       PHYSICAL EXAM    Constitutional:   Well nourished and well developed. Appears neat and clean. Patient is alert, oriented x3, and in no apparent distress. Respiratory:  Respiratory effort is easy and symmetric bilaterally. Rate is normal at rest and on room air. Vascular:  Pedal Pulses is palpable and audible signal noted with doppler.   Capillary refill is <5 sec to digits bilateral. Extremities negative for pitting edema. Neurological:  Gross and Light touch intact. Protective sensation absent    Dermatological:  Wound description noted in wound assessment. The wound is healed. Psychiatric:  Judgement and insight intact. Short and long term memory intact. No evidence of depression, anxiety, or agitation. Patient is calm, cooperative, and communicative. Appropriate interactions and affect. Assessment:      Problem List Items Addressed This Visit     Non-pressure chronic ulcer of other part of left foot with fat layer exposed ([facility] [address] - Primary (Chronic)    S/P foot surgery, left (Chronic)    Open wound of left foot           Procedure Note  Indications:  Based on my examination of this patient's wound(s)/ulcer(s) today, debridement is  required to promote healing and evaluate the wound base. Patient Active Problem List   Diagnosis Code    Cellulitis of foot L03.119    Open wound of left foot S80.80A    DM type 2 (diabetes mellitus, type 2) ([facility] E11.9    Hypertension I10    YESENIA (acute kidney injury) ([facility] [facility] N17.9    Non-pressure chronic ulcer of other part of left foot with fat layer exposed ([facility] [address] [facility] L97.522    Cellulitis L03.90    Pyogenic inflammation of bone (HCC) M86.9    S/P foot surgery, left Z98.890        Procedure Note  Indications:  Based on my examination of this patient's wound(s)/ulcer(s) today, debridement is not required to promote healing and evaluate the wound base.        Wound 08/02/20 Foot Left;Lateral #1 (Active)   Wound Image   10/27/20 1351   Wound Etiology Diabetic 10/27/20 1351   Wound Cleansed Cleansed with saline 10/27/20 1351   Dressing/Treatment Collagen with Ag;Dry dressing 10/13/20 1506   Offloading for Diabetic Foot Ulcers Felt or foam;Forefoot offloading shoe 09/18/20 1202   Wound Length (cm) 0 cm 10/27/20 1351   Wound Width (cm) 0 cm 10/27/20 1351   Wound Depth (cm) 0 cm 10/27/20 1351   Wound Surface Area (cm^2) 0 cm^2 10/27/20 1351   Change in Wound Size % (l*w) 100 10/27/20 1351   Wound Volume (cm^3) 0 cm^3 10/27/20 1351   Wound Healing % 100 10/27/20 1351   Post-Procedure Length (cm) 0 cm 10/27/20 1419   Post-Procedure Width (cm) 0 cm 10/27/20 1419   Post-Procedure Depth (cm) 0 cm 10/27/20 1419   Post-Procedure Surface Area (cm^2) 0 cm^2 10/27/20 1419   Post-Procedure Volume (cm^3) 0 cm^3 10/27/20 1419   Drainage Amount None 10/27/20 1351   Drainage Description Serous 10/13/20 1421   Odor None 10/27/20 1351   Pema-wound Assessment Hyperkeratosis (callous) 10/13/20 1421   Margins Defined edges 10/27/20 1351   Wound Thickness Description not for Pressure Injury Full thickness 10/27/20 1351   Number of days: 86         Plan:   Patient examined and healed out today  Follow up if needed. Keep blood sugars in check  Follow up with DPM for insole adjustment  Check feet daily. Treatment Note please see attached Discharge Instructions    Written patient dismissal instructions given to patient and signed by patient or POA. Discharge Instructions       Wound Clinic Physician Orders and Discharge [address] [facility] [address]   [facility], [address] [facility]  Telephone: [phone]    NAME:  Franci Pham OF BIRTH:  1960  MEDICAL RECORD NUMBER:  [id number]  DATE:  10/27/2020    Congratulations!! You have completed your treatment. 1. Return to your Primary Care Physician for all your health issues. 2. Resume your ordinary activities as tolerated. 3. Take your medications as prescribed by your primary care physician. 4. Check your skin daily for cracks, bruises, sores, or dryness. Use a moisturizer as needed. 5. Clean and dry your skin, using mild soap and warm water (not hot). 6. Avoid alcohol and caffeine and do not smoke. 7. Maintain a nutritious diet. 8. Avoid pressure on your wound site. Keep your legs elevated above the level of the heart whenever possible.    9. Continue to use wraps/stockings/compression as prescribed. 10. Replace compression stockings every four to six months as needed to ensure proper fit. 11. Wear well-fitting shoes and leg garments. 12.  APPLY THE MUPIROCIN/BACTROBAN OINTMENT TO HEALED AREA DAILY AFTER SHOWERING. 13.  APPLY PIECE OF COTTON PADDING DAILY. 14.  IF WOUND BEGINS TO DRAIN AGAIN CALL WOUND CENTER FOR APPOINTMENT. 15.  FOLLOW UP WITH PODIATRIST FOR INSOLE ADJUSTMENT AS NEEDED. THANK YOU FOR ALLOWING US TO SERVE YOU.  PLEASE CALL IF YOU DEVELOP ANOTHER WOUND. [phone]   Electronically signed by Felecia Waite DPM on 10/27/2020 at 2:19 PM                         Electronically signed by Felecia Waite DPM on 10/27/2020 at 2:21 PM

## 2020-11-10 ENCOUNTER — OFFICE VISIT (OUTPATIENT)
Dept: FAMILY MEDICINE CLINIC | Age: 60
End: 2020-11-10
Payer: COMMERCIAL

## 2020-11-10 VITALS
WEIGHT: 242.6 LBS | HEIGHT: 66 IN | BODY MASS INDEX: 38.99 KG/M2 | DIASTOLIC BLOOD PRESSURE: 80 MMHG | OXYGEN SATURATION: 96 % | HEART RATE: 70 BPM | TEMPERATURE: 98.3 F | SYSTOLIC BLOOD PRESSURE: 132 MMHG

## 2020-11-10 PROBLEM — D64.9 ANEMIA: Status: ACTIVE | Noted: 2020-11-10

## 2020-11-10 PROBLEM — E11.49 DIABETES MELLITUS TYPE 2 WITH NEUROLOGICAL MANIFESTATIONS (HCC): Status: ACTIVE | Noted: 2018-06-28

## 2020-11-10 PROBLEM — E66.01 OBESITY, CLASS III, BMI 40-49.9 (MORBID OBESITY) (HCC): Status: ACTIVE | Noted: 2018-06-19

## 2020-11-10 PROBLEM — M86.171 OSTEOMYELITIS OF FOOT, RIGHT, ACUTE (HCC): Status: ACTIVE | Noted: 2018-06-28

## 2020-11-10 LAB — HBA1C MFR BLD: 6.8 %

## 2020-11-10 PROCEDURE — 99214 OFFICE O/P EST MOD 30 MIN: CPT | Performed by: FAMILY MEDICINE

## 2020-11-10 PROCEDURE — 90471 IMMUNIZATION ADMIN: CPT | Performed by: FAMILY MEDICINE

## 2020-11-10 PROCEDURE — 83036 HEMOGLOBIN GLYCOSYLATED A1C: CPT | Performed by: FAMILY MEDICINE

## 2020-11-10 PROCEDURE — 90688 IIV4 VACCINE SPLT 0.5 ML IM: CPT | Performed by: FAMILY MEDICINE

## 2020-11-10 ASSESSMENT — PATIENT HEALTH QUESTIONNAIRE - PHQ9
SUM OF ALL RESPONSES TO PHQ QUESTIONS 1-9: 0
SUM OF ALL RESPONSES TO PHQ QUESTIONS 1-9: 0
1. LITTLE INTEREST OR PLEASURE IN DOING THINGS: 0
2. FEELING DOWN, DEPRESSED OR HOPELESS: 0
SUM OF ALL RESPONSES TO PHQ QUESTIONS 1-9: 0
SUM OF ALL RESPONSES TO PHQ9 QUESTIONS 1 & 2: 0

## 2020-11-10 NOTE — PROGRESS NOTES
Vaccine Information Sheet, \"Influenza - Inactivated\"  given to Robert Santillan, or parent/legal guardian of  Robert Santillan and verbalized understanding. Patient responses:    Have you ever had a reaction to a flu vaccine? NO    Are you able to eat eggs without adverse effects? YES    Do you have any current illness? NO    Have you ever had Guillian Jenkins Syndrome? NO    Flu vaccine given per order. Please see immunization tab.

## 2020-11-10 NOTE — PROGRESS NOTES
Chief Complaint   Patient presents with    Diabetes     Checks blood sugar twice a day. Does keep a log, did not bring with her today.  Hypertension     Does not monitor BP at home. Denies any chest pain or heart palpitations. HPI: Lazara Blancas 56 y.o. female presenting for     Patient is coming here to establish care. Diabetes Mellitus Type 2: Current symptoms/problems include foot ulcerations and neuropathy. Medication compliance:  compliant all of the time  Diabetic diet compliance:  compliant all of the time,  Weight trend: decreasing  Current exercise: no regular exercise  243 lbs  Barriers: none and impairment:  visual    Home blood sugar records: 107-174 early morning sugars have been 140s 170s   Any episodes of hypoglycemia? 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 Cataract in the right)   reports that she has never smoked. She has never used smokeless tobacco.   Daily Aspirin? No: unsure why. Takes 1000 mg twice daily 5 mg glipizide, at night takes 12 U of basaglar. Lab Results   Component Value Date    LABA1C 9.3 (H) 08/04/2020     No results found for: EAG    Hypertension  Patient is here for follow-up of elevated blood pressure. She is not exercising and is adherent to a low-salt diet. Blood pressure is not well controlled at home. Cardiac symptoms: none. Patient denies chest pressure/discomfort, dyspnea, fatigue, lower extremity edema, orthopnea, palpitations and paroxysmal nocturnal dyspnea. Cardiovascular risk factors: advanced age (older than 54 for men, 72 for women), diabetes mellitus, dyslipidemia, hypertension, obesity (BMI >= 30 kg/m2), sedentary lifestyle and smoking/ tobacco exposure. Quit smoking in 2006 Use of agents associated with hypertension: none. History of target organ damage: none.     BP Readings from Last 20 Encounters:   11/10/20 132/80   10/27/20 (!) 189/83   10/06/20 (!) 163/95   09/29/20 (!) 181/79   09/18/20 (!) 173/88   09/15/20 138/80   09/11/20 (!) 212/93   09/04/20 (!) 147/56   09/02/20 (!) 101/56   08/11/20 (!) 179/88   08/11/20 130/84   08/04/20 (!) 167/76   10/03/19 (!) 158/80   09/19/19 (!) 186/85   ]    Current Outpatient Medications   Medication Sig Dispense Refill    doxycycline hyclate (VIBRA-TABS) 100 MG tablet Take 1 tablet by mouth 2 times daily 60 tablet 1    atorvastatin (LIPITOR) 20 MG tablet Take 1 tablet by mouth daily 30 tablet 3    insulin glargine (LANTUS) 100 UNIT/ML injection vial Inject 14 Units into the skin nightly      glipiZIDE (GLUCOTROL) 5 MG tablet TAKE 1 TABLET BY MOUTH ONCE DAILY BEFORE A MEAL      lisinopril (PRINIVIL;ZESTRIL) 20 MG tablet TAKE 1 TABLET BY MOUTH ONCE DAILY 30 tablet 3    metFORMIN (GLUCOPHAGE) 1000 MG tablet Take 2 tablets by mouth 2 times daily (with meals) (Patient taking differently: Take 1,000 mg by mouth 2 times daily (with meals) ) 60 tablet 3    albuterol (PROVENTIL) (2.5 MG/3ML) 0.083% nebulizer solution Take 3 mLs by nebulization every 6 hours as needed for Wheezing or Shortness of Breath 120 each 3    mupirocin (BACTROBAN) 2 % ointment Apply 22 g topically 3 times daily Apply topically 3 times daily. No current facility-administered medications for this visit. ROS  CONSTITUTIONAL: The patient denies fevers, chills, sweats and body ache. HEENT: Denies headache, blurry vision, eye pain, tinnitus, vertigo,  sore throat, neck or thyroid masses. RESPIRATORY: Denies cough, sputum, hemoptysis. CARDIAC: Denies chest pain, pressure, palpitations, Denies lower extremity edema. GASTROINTESTINAL: Denies abdominal pain, constipation, diarrhea, bleeding in the stools,   GENITOURINARY: Denies dysuria, hematuria, nocturia or frequency, urinary incontinence.   NEUROLOGIC: Denies headaches, dizziness, syncope, weakness  MUSCULOSKELETAL: denies changes in range of motion, joint pain, stiffness. ENDOCRINOLOGY: Denies heat or cold intolerance. HEMATOLOGY: Denies easy bleeding or blood transfusion,anemia  DERMATOLOGY: Denies changes in moles or pigmentation changes. PSYCHIATRY: Denies depression, agitation or anxiety.     Past Medical History:   Diagnosis Date    Depression     DM type 2 (diabetes mellitus, type 2) (HCC)     Hyperlipidemia     Hypertension     SVT (supraventricular tachycardia) (HCC)         Past Surgical History:   Procedure Laterality Date    ABLATION OF DYSRHYTHMIC FOCUS      EYE SURGERY      FOOT DEBRIDEMENT Left 9/2/2020    INCISION AND DRAINAGE LEFT FIFTH METATARSAL HEAD RESECTION performed by Wes Fleming DPM at [address] [place] [address] FOOT SURGERY Right         Family History   Family history unknown: Yes        Social History     Socioeconomic History    Marital status:      Spouse name: Not on file    Number of children: Not on file    Years of education: Not on file    Highest education level: Not on file   Occupational History    Not on file   Social Needs    Financial resource strain: Not on file    Food insecurity     Worry: Not on file     Inability: Not on file    Transportation needs     Medical: Not on file     Non-medical: Not on file   Tobacco Use    Smoking status: Never Smoker    Smokeless tobacco: Never Used   Substance and Sexual Activity    Alcohol use: Yes     Comment: socailly     Drug use: Never    Sexual activity: Not on file   Lifestyle    Physical activity     Days per week: Not on file     Minutes per session: Not on file    Stress: Not on file   Relationships    Social connections     Talks on phone: Not on file     Gets together: Not on file     Attends [employer] service: Not on file     Active member of club or organization: Not on file     Attends meetings of clubs or organizations: Not on file     Relationship status: Not on file    Intimate partner violence     Fear of current or ex partner: Not on file Emotionally abused: Not on file     Physically abused: Not on file     Forced sexual activity: Not on file   Other Topics Concern    Not on file   Social History Narrative    Not on file        /80   Pulse 70   Temp 98.3 °F (36.8 °C) (Oral)   Ht 5' 6\" (1.676 m)   Wt 242 lb 9.6 oz (110 kg)   SpO2 96%   BMI 39.16 kg/m²        Physical Exam:    General appearance - alert, well appearing, and in no distress  Mental Status - alert, oriented to person, place, and time  Eyes - pupils equal and reactive, extraocular eye movements intact   Ears - bilateral TM's and external ear canals normal   Nose - normal and patent, no erythema, discharge or polyps   Sinuses - Normal paranasal sinuses without tenderness   Throat - mucous membranes moist, pharynx normal without lesions   Neck - supple, no significant adenopathy   Thyroid - thyroid is normal in size without nodules or tenderness    Chest - clear to auscultation, no wheezes, rales or rhonchi, symmetric air entry   Heart - normal rate, regular rhythm, normal S1, S2, no murmurs, rubs, clicks or gallops  Abdomen - soft, nontender, nondistended, no masses or organomegaly   Back exam - full range of motion, no tenderness, palpable spasm or pain on motion   Neurological - alert, oriented, normal speech, no focal findings or movement disorder noted   Musculoskeletal - no joint tenderness, deformity or swelling   Extremities - peripheral pulses normal, no pedal edema, no clubbing or cyanosis   Skin -  Small laceration noted on the left foot. No signs of drainage or erythema. No tenderness to palpation.       Labs   No results found for: TSHREFLEX  No results found for: TSH    Lab Results   Component Value Date     09/29/2020    K 4.6 09/29/2020     09/29/2020    CO2 24 09/29/2020    BUN 19 09/29/2020    CREATININE 0.76 09/29/2020    GLUCOSE 156 (H) 09/29/2020    CALCIUM 9.5 09/29/2020    PROT 7.1 08/30/2020    LABALBU 3.7 08/30/2020    BILITOT 0.4 08/30/2020    ALKPHOS 82 08/30/2020    AST 18 08/30/2020    ALT 23 08/30/2020    LABGLOM >60.0 09/29/2020    GFRAA >60.0 09/29/2020    GLOB 3.4 08/30/2020       Lab Results   Component Value Date    WBC 6.9 09/29/2020    HGB 11.5 (L) 09/29/2020    HCT 36.3 (L) 09/29/2020    MCV 79.1 (L) 09/29/2020     (H) 09/29/2020     Lab Results   Component Value Date    LABA1C 9.3 (H) 08/04/2020     No results found for: EAG    Lab Results   Component Value Date    LABA1C 6.8 11/10/2020     No results found for: EAG  Lab Results   Component Value Date     09/29/2020    K 4.6 09/29/2020     09/29/2020    CO2 24 09/29/2020    BUN 19 09/29/2020    CREATININE 0.76 09/29/2020    GLUCOSE 156 (H) 09/29/2020    CALCIUM 9.5 09/29/2020    PROT 7.1 08/30/2020    LABALBU 3.7 08/30/2020    BILITOT 0.4 08/30/2020    ALKPHOS 82 08/30/2020    AST 18 08/30/2020    ALT 23 08/30/2020    LABGLOM >60.0 09/29/2020    GFRAA >60.0 09/29/2020    GLOB 3.4 08/30/2020           A/P: Michaela Zepeda 56 y.o. female presenting for     1. Type 2 diabetes mellitus with both eyes affected by proliferative retinopathy and macular edema, with long-term current use of insulin (HCC)    - POCT glycosylated hemoglobin (Hb A1C)  - Amb External Referral To Ophthalmology    2. Screening for colon cancer  Patient had a Cologuard order placed in March. Follow-up on order. 3. Need for influenza vaccination    - INFLUENZA, QUADV, 3 YRS AND OLDER, IM, MDV, 0.5ML ([facility])    4. Hypertension, unspecified type  Stable at this time. No issues or concerns. 5.  Mammogram  Mammogram order reprinted for patient. Please note, this report has been partially produced using speech recognition software  and may cause  and /or contain errors related to that system including grammar, punctuation and spelling as well as words and phrases that may seem inappropriate. If there are questions or concerns please feel free to contact me to clarify.

## 2020-11-12 ENCOUNTER — APPOINTMENT (OUTPATIENT)
Dept: GENERAL RADIOLOGY | Age: 60
End: 2020-11-12
Payer: COMMERCIAL

## 2020-11-12 ENCOUNTER — HOSPITAL ENCOUNTER (EMERGENCY)
Age: 60
Discharge: HOME | End: 2020-11-12
Payer: COMMERCIAL

## 2020-11-12 ENCOUNTER — TELEPHONE (OUTPATIENT)
Dept: WOUND CARE | Age: 60
End: 2020-11-12

## 2020-11-12 VITALS
RESPIRATION RATE: 16 BRPM | OXYGEN SATURATION: 96 % | WEIGHT: 242 LBS | TEMPERATURE: 98.3 F | HEIGHT: 66 IN | BODY MASS INDEX: 38.89 KG/M2 | DIASTOLIC BLOOD PRESSURE: 73 MMHG | SYSTOLIC BLOOD PRESSURE: 169 MMHG | HEART RATE: 72 BPM

## 2020-11-12 LAB
ALBUMIN SERPL-MCNC: 3.8 G/DL (ref 3.5–4.6)
ALP BLD-CCNC: 99 U/L (ref 40–130)
ALT SERPL-CCNC: 28 U/L (ref 0–33)
ANION GAP SERPL CALCULATED.3IONS-SCNC: 13 MEQ/L (ref 9–15)
AST SERPL-CCNC: 24 U/L (ref 0–35)
BASOPHILS ABSOLUTE: 0 K/UL (ref 0–0.2)
BASOPHILS RELATIVE PERCENT: 0.5 %
BILIRUB SERPL-MCNC: <0.2 MG/DL (ref 0.2–0.7)
BUN BLDV-MCNC: 19 MG/DL (ref 8–23)
CALCIUM SERPL-MCNC: 9.4 MG/DL (ref 8.5–9.9)
CHLORIDE BLD-SCNC: 104 MEQ/L (ref 95–107)
CO2: 24 MEQ/L (ref 20–31)
CREAT SERPL-MCNC: 0.77 MG/DL (ref 0.5–0.9)
EOSINOPHILS ABSOLUTE: 0.2 K/UL (ref 0–0.7)
EOSINOPHILS RELATIVE PERCENT: 2.6 %
GFR AFRICAN AMERICAN: >60
GFR NON-AFRICAN AMERICAN: >60
GLOBULIN: 3 G/DL (ref 2.3–3.5)
GLUCOSE BLD-MCNC: 97 MG/DL (ref 70–99)
HCT VFR BLD CALC: 36.4 % (ref 37–47)
HEMOGLOBIN: 11.8 G/DL (ref 12–16)
LYMPHOCYTES ABSOLUTE: 1.9 K/UL (ref 1–4.8)
LYMPHOCYTES RELATIVE PERCENT: 20.9 %
MCH RBC QN AUTO: 25.6 PG (ref 27–31.3)
MCHC RBC AUTO-ENTMCNC: 32.4 % (ref 33–37)
MCV RBC AUTO: 79.2 FL (ref 82–100)
MONOCYTES ABSOLUTE: 0.6 K/UL (ref 0.2–0.8)
MONOCYTES RELATIVE PERCENT: 6.6 %
NEUTROPHILS ABSOLUTE: 6.3 K/UL (ref 1.4–6.5)
NEUTROPHILS RELATIVE PERCENT: 69.4 %
PDW BLD-RTO: 15.8 % (ref 11.5–14.5)
PLATELET # BLD: 425 K/UL (ref 130–400)
POTASSIUM SERPL-SCNC: 4.7 MEQ/L (ref 3.4–4.9)
RBC # BLD: 4.6 M/UL (ref 4.2–5.4)
SODIUM BLD-SCNC: 141 MEQ/L (ref 135–144)
TOTAL PROTEIN: 6.8 G/DL (ref 6.3–8)
WBC # BLD: 9 K/UL (ref 4.8–10.8)

## 2020-11-12 PROCEDURE — 73630 X-RAY EXAM OF FOOT: CPT

## 2020-11-12 PROCEDURE — 36415 COLL VENOUS BLD VENIPUNCTURE: CPT

## 2020-11-12 PROCEDURE — 99282 EMERGENCY DEPT VISIT SF MDM: CPT

## 2020-11-12 PROCEDURE — 85025 COMPLETE CBC W/AUTO DIFF WBC: CPT

## 2020-11-12 PROCEDURE — 80053 COMPREHEN METABOLIC PANEL: CPT

## 2020-11-12 ASSESSMENT — PAIN DESCRIPTION - ORIENTATION: ORIENTATION: LEFT

## 2020-11-12 ASSESSMENT — ENCOUNTER SYMPTOMS
EYE REDNESS: 0
COLOR CHANGE: 0
ABDOMINAL PAIN: 0
VOMITING: 0
BACK PAIN: 0
SHORTNESS OF BREATH: 0
RHINORRHEA: 0
COUGH: 0
SINUS PAIN: 0
EYE DISCHARGE: 0
NAUSEA: 0
DIARRHEA: 0
CHEST TIGHTNESS: 0

## 2020-11-12 ASSESSMENT — PAIN DESCRIPTION - LOCATION: LOCATION: FOOT

## 2020-11-12 ASSESSMENT — PAIN SCALES - GENERAL: PAINLEVEL_OUTOF10: 9

## 2020-11-12 NOTE — ED PROVIDER NOTES
[address] [facility] ED  EMERGENCY DEPARTMENT ENCOUNTER      Pt Name: Brynn Roy  MRN: [id number]  [place] 1960  Date of evaluation: 11/12/2020  Provider: Neena Quezada PA-C    CHIEF COMPLAINT       Chief Complaint   Patient presents with    Wound Check     surgery to left foot on 4/4/20. redness and pain  now with small amount of  drainage. referred by wound clinic         HISTORY OF PRESENT ILLNESS   (Location/Symptom, Timing/Onset, Context/Setting, Quality, Duration, Modifying Factors, Severity)  Note limiting factors. Brynn Roy is a 56 y.o. female who presents to the emergency department sudden onset, severe, intermittent, sharp, left lateral foot pain around her surgical incision. Patient had surgery on 4/4/20 and has followed up religiously however over the past couple of days she has began to have increased pain and erythema around the site of surgery. Patient states bearing weight or wearing a sock makes the pain worse sitting down taking that cough makes the pain better. Patient denies taking any medications for pain. Patient was referred to the ED via wound care for concern of osteomyelitis since patient has a history of that. HPI    Nursing Notes were reviewed. REVIEW OF SYSTEMS    (2-9 systems for level 4, 10 or more for level 5)     Review of Systems   Constitutional: Negative for activity change, chills, fatigue and fever. HENT: Negative for congestion, hearing loss, rhinorrhea, sinus pain, sneezing and tinnitus. Eyes: Negative for discharge, redness and visual disturbance. Respiratory: Negative for cough, chest tightness and shortness of breath. Cardiovascular: Negative for chest pain and leg swelling. Gastrointestinal: Negative for abdominal pain, diarrhea, nausea and vomiting. Endocrine: Negative for heat intolerance, polydipsia and polyuria. Genitourinary: Negative for dysuria, flank pain, hematuria and urgency.    Musculoskeletal: Negative for Head: Normocephalic. Right Ear: Tympanic membrane, ear canal and external ear normal.      Left Ear: Tympanic membrane, ear canal and external ear normal.      Nose: Nose normal.      Mouth/Throat:      Mouth: Mucous membranes are moist.      Pharynx: Oropharynx is clear. No oropharyngeal exudate or posterior oropharyngeal erythema. Eyes:      Conjunctiva/sclera: Conjunctivae normal.      Pupils: Pupils are equal, round, and reactive to light. Neck:      Musculoskeletal: Normal range of motion. No neck rigidity. Cardiovascular:      Rate and Rhythm: Normal rate and regular rhythm. Pulses: Normal pulses. Heart sounds: Normal heart sounds. No murmur. No friction rub. Pulmonary:      Effort: Pulmonary effort is normal. No respiratory distress. Breath sounds: Normal breath sounds. No stridor. Abdominal:      General: Abdomen is flat. Bowel sounds are normal.      Palpations: Abdomen is soft. Genitourinary:     Vagina: No vaginal discharge. Musculoskeletal: Normal range of motion. General: No swelling. Left foot: Tenderness present. Feet:       Comments: 1 cm well-healed surgical incision just proximal of the fifth digit on the left lateral foot. Mild erythema around the site. Tender to palpation. Neurological:      General: No focal deficit present. Mental Status: She is alert. Psychiatric:         Mood and Affect: Mood normal.         Behavior: Behavior normal.         DIAGNOSTIC RESULTS     EKG: All EKG's are interpreted by the Emergency Department Physician who either signs or Co-signs this chart in the absence of a cardiologist.        RADIOLOGY:   Non-plain film images such as CT, Ultrasound and MRI are read by the radiologist. Plain radiographic images are visualized and preliminarily interpreted by the emergency physician with the below findings:    No acute fracture, malalignment, no signs of osteomyelitis.     Interpretation per the Radiologist below, if available at the time of this note:    XR FOOT LEFT (MIN 3 VIEWS)    (Results Pending)         ED BEDSIDE ULTRASOUND:   Performed by ED Physician - none    LABS:  Labs Reviewed   CBC WITH AUTO DIFFERENTIAL - Abnormal; Notable for the following components:       Result Value    Hemoglobin 11.8 (*)     Hematocrit 36.4 (*)     MCV 79.2 (*)     MCH 25.6 (*)     MCHC 32.4 (*)     RDW 15.8 (*)     Platelets 559 (*)     All other components within normal limits   COMPREHENSIVE METABOLIC PANEL       All other labs were within normal range or not returned as of this dictation. EMERGENCY DEPARTMENT COURSE and DIFFERENTIAL DIAGNOSIS/MDM:   Vitals:    Vitals:    11/12/20 1529   BP: (!) 169/73   Pulse: 72   Resp: 16   Temp: 98.3 °F (36.8 °C)   TempSrc: Oral   SpO2: 96%   Weight: 242 lb (109.8 kg)   Height: 5' 6\" (1.676 m)       57-year of age female who presents with left lateral foot pain concern for osteomyelitis was referred here by wound clinic. Wound appears well-healed, mildly erythematous tender to palpation. Foot x-ray, CBC, CMP will be obtained. Patient be reassessed. Patient is not not having symptoms at this point time. Pain medications not required. MDM        REASSESSMENT      On reassessment patient remains pain-free, asymptomatic. CBC was negative for increased white count. X-ray was negative for any osteomyelitis. Patient advised of these findings. Patient be discharged home with close follow-up with wound care. Patient's questions were answered. Patient will be discharged home in stable condition. CONSULTS:  None    PROCEDURES:  Unless otherwise noted below, none     Procedures        FINAL IMPRESSION      1. Post-operative pain    2. Visit for wound check          DISPOSITION/PLAN   DISPOSITION Decision To Discharge 11/12/2020 05:13:55 PM      PATIENT REFERRED TO:  No follow-up provider specified.     DISCHARGE MEDICATIONS:  New Prescriptions    No medications on file     Controlled Substances Monitoring:     No flowsheet data found.     (Please note that portions of this note were completed with a voice recognition program.  Efforts were made to edit the dictations but occasionally words are mis-transcribed.)    Rigo Bunch PA-C (electronically signed)             Rigo Bunch PA-C  11/12/20 8429

## 2020-11-12 NOTE — ED TRIAGE NOTES
Pt  referred to er from wound clinic. She had surgery to her left foot in sept. She has had minimal drainage from it but it is now red and painful.  As  Pt is diabetic it was  Advised for her to have an er evaluation

## 2020-11-12 NOTE — ED NOTES
Pt discharged in stable condition. All questions answered and education provided.        Mitchell Lazcano RN  11/12/20 3041

## 2020-11-19 ENCOUNTER — VIRTUAL VISIT (OUTPATIENT)
Dept: INFECTIOUS DISEASES | Age: 60
End: 2020-11-19
Payer: COMMERCIAL

## 2020-11-19 PROCEDURE — 99213 OFFICE O/P EST LOW 20 MIN: CPT | Performed by: INTERNAL MEDICINE

## 2020-11-19 ASSESSMENT — ENCOUNTER SYMPTOMS
RESPIRATORY NEGATIVE: 1
NAUSEA: 1

## 2020-11-19 NOTE — PROGRESS NOTES
Infectious Disease     Patient Name: Linda Aparicio  Date: 11/19/2020  YOB: 1960  Medical Record Number: [id number]              Linda Aparicio is a 56 y.o. female being evaluated by a Virtual Visit (video visit) encounter to address concerns as mentioned above. A caregiver was present when appropriate. Due to this being a TeleHealth encounter (During [address] emergency), evaluation of the following organ systems was limited: Vitals/Constitutional/EENT/Resp/CV/GI//MS/Neuro/Skin/Heme-Lymph-Imm. Pursuant to the emergency declaration under the [address] [place] [address] authority and the [employer] Act, this Virtual Visit was conducted with patient's (and/or legal guardian's) consent, to reduce the patient's risk of exposure to COVID-19 and provide necessary medical care. The patient (and/or legal guardian) has also been advised to contact this office for worsening conditions or problems, and seek emergency medical treatment and/or call 911 if deemed necessary. Patient identification was verified at the start of the visit: Yes    Total time spent for this encounter: 15min    Services were provided through a video synchronous discussion virtually to substitute for in-person clinic visit. Patient and provider were located at their individual homes. --Campos Curry MD on 11/19/2020 at 11:27 AM    An electronic signature was used to authenticate this note.         Osteomyelitis left foot           Patient presented 8/30/2020 with red swollen painful L foot  [place]  Patient had been receiving oral antibiotics  White blood cell count was 20,000     Patient had been hospitalized in early August with diabetic foot infection initially treated with vancomycin meropenem  MRI not show evidence of osteomyelitis at that time  Patient was discharged on oral doxycycline  Patient never followed up with me as outpatient     Cultures from 8/30/2020 of the LEFT foot show streptococci coagulase-negative staphylococci anaerobic bacteria        Patient went to surgery  9/2/2020 incision and drainage left fifth metatarsal head with resection     Osteomyelitis left foot  Invanz       Patient was last seen virtually 10/20/2020 with a plan for 6 weeks of Invanz at that time    Review of Systems   HENT: Negative. Respiratory: Negative. Cardiovascular: Negative. Gastrointestinal: Positive for nausea.        Review of Systems: All 14 review of systems negative other than as stated above    Social History     Tobacco Use    Smoking status: Never Smoker    Smokeless tobacco: Never Used   Substance Use Topics    Alcohol use: Yes     Comment: socailly     Drug use: Never         Past Medical History:   Diagnosis Date    Depression     DM type 2 (diabetes mellitus, type 2) (HCC)     Hyperlipidemia     Hypertension     SVT (supraventricular tachycardia) (HCC)            Past Surgical History:   Procedure Laterality Date    ABLATION OF DYSRHYTHMIC FOCUS      EYE SURGERY      FOOT DEBRIDEMENT Left 9/2/2020    INCISION AND DRAINAGE LEFT FIFTH METATARSAL HEAD RESECTION performed by Wes [facility] DPM [facility] Right          Current Outpatient Medications on File Prior to Visit   Medication Sig Dispense Refill    doxycycline hyclate (VIBRA-TABS) 100 MG tablet Take 1 tablet by mouth 2 times daily 60 tablet 1    atorvastatin (LIPITOR) 20 MG tablet Take 1 tablet by mouth daily 30 tablet 3    insulin glargine (LANTUS) 100 UNIT/ML injection vial Inject 14 Units into the skin nightly      glipiZIDE (GLUCOTROL) 5 MG tablet TAKE 1 TABLET BY MOUTH ONCE DAILY BEFORE A MEAL      lisinopril (PRINIVIL;ZESTRIL) 20 MG tablet TAKE 1 TABLET BY MOUTH ONCE DAILY 30 tablet 3    metFORMIN (GLUCOPHAGE) 1000 MG tablet Take 2 tablets by mouth 2 times daily (with meals) (Patient taking differently: Take 1,000 mg by mouth 2 times daily (with meals) ) 60 tablet 3    albuterol (PROVENTIL) (2.5 MG/3ML) 0.083% nebulizer solution Take 3 mLs by nebulization every 6 hours as needed for Wheezing or Shortness of Breath 120 each 3    mupirocin (BACTROBAN) 2 % ointment Apply 22 g topically 3 times daily Apply topically 3 times daily. No current facility-administered medications on file prior to visit. Allergies   Allergen Reactions    Cat Hair Extract      itching, ankle and hands    Penicillins     Statins Other (See Comments)     Skin sloughing    Sulfa Antibiotics     Trimethoprim Itching and Other (See Comments)     Other reaction(s): Other: See Comments  Burning and redness in eye(eye drop)  Burning and redness in eye(eye drop)           Family History   Family history unknown: Yes           .    Lab Results   Component Value Date    WBC 9.0 11/12/2020    HGB 11.8 (L) 11/12/2020    HCT 36.4 (L) 11/12/2020    MCV 79.2 (L) 11/12/2020     (H) 11/12/2020     Lab Results   Component Value Date     11/12/2020    K 4.7 11/12/2020    K 4.1 08/04/2020     11/12/2020    CO2 24 11/12/2020    BUN 19 11/12/2020    CREATININE 0.77 11/12/2020    GLUCOSE 97 11/12/2020    GLUCOSE 179 09/27/2019    CALCIUM 9.4 11/12/2020        Patient is able to show me her foot with her camera phone no evidence of swelling no drainage no open wound      ASSESSMENT:  PLAN:  Patient completed Lili Seaman     currently on oral doxycycline she is to complete    3-month follow-up

## 2020-12-01 ENCOUNTER — TELEPHONE (OUTPATIENT)
Dept: WOUND CARE | Age: 60
End: 2020-12-01

## 2020-12-04 ENCOUNTER — TELEPHONE (OUTPATIENT)
Dept: WOUND CARE | Age: 60
End: 2020-12-04

## 2020-12-15 RX ORDER — LISINOPRIL 20 MG/1
TABLET ORAL
Qty: 30 TABLET | Refills: 3 | Status: SHIPPED | OUTPATIENT
Start: 2020-12-15

## 2021-02-18 ENCOUNTER — TELEPHONE (OUTPATIENT)
Dept: FAMILY MEDICINE CLINIC | Age: 61
End: 2021-02-18

## 2021-02-18 NOTE — TELEPHONE ENCOUNTER
Call placed to patient to offer scheduling assistance for new PCP follow-up appt. LMOM to call back.

## 2021-04-12 DIAGNOSIS — E11.621 TYPE 2 DIABETES MELLITUS WITH FOOT ULCER, WITH LONG-TERM CURRENT USE OF INSULIN (HCC): ICD-10-CM

## 2021-04-12 DIAGNOSIS — Z79.4 TYPE 2 DIABETES MELLITUS WITH FOOT ULCER, WITH LONG-TERM CURRENT USE OF INSULIN (HCC): ICD-10-CM

## 2021-04-12 DIAGNOSIS — L97.509 TYPE 2 DIABETES MELLITUS WITH FOOT ULCER, WITH LONG-TERM CURRENT USE OF INSULIN (HCC): ICD-10-CM

## 2021-04-27 ENCOUNTER — TELEPHONE (OUTPATIENT)
Dept: PHARMACY | Facility: CLINIC | Age: 61
End: 2021-04-27

## 2021-04-27 NOTE — TELEPHONE ENCOUNTER
[facility] CLINICAL PHARMACY REVIEW: ADHERENCE REVIEW  Identified care gap per [employer]; fills at [facility]: ACE/ARB, Diabetes and Statin adherence    Last Visit: 11/10/20      ASSESSMENT  ACE/ARB ADHERENCE    Per Insurance Records through [facility]:  LISINOPRIL TAB 20MG last filled on 4/8/21 for 90 day supply. Next refill due: 7/7/21    Per [address] [place] [address] St:   LISINOPRIL TAB 20MG last picked up on 4/8/21 for 90 day supply. 1 year of refills remaining. Billed through [employer]     BP Readings from Last 3 Encounters:   11/12/20 (!) 169/73   11/10/20 132/80   10/27/20 (!) 189/83     CrCl cannot be calculated (Patient's most recent lab result is older than the maximum 120 days allowed. ). DIABETES ADHERENCE    Per Insurance Records through [facility]:  GLIPIZIDE TAB 5MG last filled on 4/8/21 for 90 day supply. Next refill due: 7/7/21    Per [address] [place] [address] St:   GLIPIZIDE TAB 5MG last picked up on 4/8/21 for 90 day supply. 1 year of refills remaining. Billed through [employer]       Per [address] [place] [address] St:   Metformin 1000mg tab last picked up on 4/12/21 for 90 day supply. 0 refills remaining. Billed through [employer]. New rx on file for 90 d/s with 1 refill. Lab Results   Component Value Date    LABA1C 6.8 11/10/2020    LABA1C 9.3 (H) 08/04/2020     NOTE A1c not yet completed this calendar year    [facility]    Per [address] [place] [address] St:   Atorvastatin 20mg tab last picked up on 4/8/21 for 90 day supply. 3 refills remaining. Billed through [employer]       No results found for: CHOL, TRIG, HDL, LDLCHOLESTEROL, LDLCALC, LDLDIRECT  ALT   Date Value Ref Range Status   11/12/2020 28 0 - 33 U/L Final     AST   Date Value Ref Range Status   11/12/2020 24 0 - 35 U/L Final     The ASCVD Risk score (Vilma Boston, et al., 2013) failed to calculate for the following reasons:    Cannot find a previous HDL lab    Cannot find a previous total cholesterol lab     PLAN    Patient has picked up medications recently all for 90 d/s.  All prescriptions have

## 2021-09-14 ENCOUNTER — TELEPHONE (OUTPATIENT)
Dept: FAMILY MEDICINE CLINIC | Age: 61
End: 2021-09-14

## 2021-09-20 ENCOUNTER — TELEPHONE (OUTPATIENT)
Dept: INTERNAL MEDICINE | Age: 61
End: 2021-09-20

## 2021-09-20 NOTE — TELEPHONE ENCOUNTER
----- Message from Mike Watson MD sent at 2021  8:38 AM EDT -----  Please let patient know that her cologaurd order has . Please let me know if she would like this reordered. Also patient has not been seen in close to a year. Please help patient make an appointment if she is still seeing me. Thank you.

## 2022-02-09 ENCOUNTER — TELEPHONE (OUTPATIENT)
Dept: FAMILY MEDICINE CLINIC | Age: 62
End: 2022-02-09

## 2023-07-25 NOTE — PROGRESS NOTES
Assessment complete. Alert and oriented. Patient denies pain, SOB, and dizziness at this time. She denies all other needs at this time. Call light within reach. Will continue to monitor.  Electronically signed by Yelitza Acevedo RN on 8/3/2020 at 1:33 AM On levothyroxine.  Last TSH slightly elevated

## 2023-08-02 ENCOUNTER — TELEPHONE (OUTPATIENT)
Dept: PHARMACY | Facility: CLINIC | Age: 63
End: 2023-08-02

## 2023-08-02 NOTE — TELEPHONE ENCOUNTER
[facility] CLINICAL PHARMACY: ADHERENCE REVIEW  Identified care gap per [place] Per insurer report, LIS-0 - co-pays are based on tiers and patient is subject to coverage gap.    fills with [address]: ACE/ARB and Statin adherence    Patient also appears to be prescribed:DIABETES      ASSESSMENT    ACE/ARB ADHERENCE    Insurance Records claims through 07/24/2023 (Prior Year [address] = not reported; YTD [address] [place] [address] = 100%; Potential Fail Date: 11.20.23):   Lisinopril last filled on 06.14.23 for 90 day supply. Next refill due: 11.20.23    Per Insurer Portal: last filled on (same as above). .     BP Readings from Last 3 Encounters:   11/12/20 (!) 169/73   11/10/20 132/80   10/27/20 (!) 189/83     CrCl cannot be calculated (Patient's most recent lab result is older than the maximum 180 days allowed. ). Lab Results   Component Value Date    CREATININE 0.77 11/12/2020     Lab Results   Component Value Date    K 4.7 11/12/2020        DIABETES ADHERENCE    Per Insurer Portal:   Metformin last filled on 07.09.23 for 90 day supply. Glipizide last filled on 06.13.23 for 90 day supply. Lab Results   Component Value Date    LABA1C 6.8 11/10/2020    LABA1C 9.3 (H) 08/04/2020     NOTE: A1c not yet completed this calendar year     [address] Records claims through 07/24/2023 (Prior Year [address] = not reported; YTD [address] [place] [address] Street = FIRST FILL; Potential Fail Date: 08.08.23): Atorvastatin last filled on 03.13.23 for 90 day supply. Next refill due: 06.11.23    Per Insurer Portal: last filled on (same as above). .    No results found for: CHOL, TRIG, HDL, LDLCHOLESTEROL, LDLCALC, LDLDIRECT  ALT   Date Value Ref Range Status   11/12/2020 28 0 - 33 U/L Final     AST   Date Value Ref Range Status   11/12/2020 24 0 - 35 U/L Final     The ASCVD Risk score (Benigno GOMEZ, et al., 2019) failed to calculate for the following reasons:     The systolic blood pressure is missing    Cannot find a previous HDL lab    Cannot find a previous

## (undated) DEVICE — INTENDED FOR TISSUE SEPARATION, AND OTHER PROCEDURES THAT REQUIRE A SHARP SURGICAL BLADE TO PUNCTURE OR CUT.: Brand: BARD-PARKER ® CARBON RIB-BACK BLADES

## (undated) DEVICE — SET IRRIG TB DISP FOR BONESCALPEL

## (undated) DEVICE — BANDAGE COBAN 4 IN COMPR W4INXL5YD FOAM COHESIVE QUIK STK SELF ADH SFT

## (undated) DEVICE — COUNTER NDL 40 COUNT HLD 70 FOAM BLK ADH W/ MAG

## (undated) DEVICE — PACK ARTHRO III ST SIRUS

## (undated) DEVICE — PENCIL SMK EVAC 10 FT BLADE ELECTRD ROCKER FOR TELSCP

## (undated) DEVICE — BANDAGE COMPR W4INXL5YD BGE HI E W/ REM CLP SURE-WRAP

## (undated) DEVICE — PROBE HATCHED TIP DEB TI SONIC 1

## (undated) DEVICE — SYRINGE IRRIG 60ML SFT PLIABLE BLB EZ TO GRP 1 HND USE W/

## (undated) DEVICE — COVER LT HNDL BLU PLAS

## (undated) DEVICE — GAUZE,SPONGE,FLUFF,6"X6.75",STRL,10/TRAY: Brand: MEDLINE

## (undated) DEVICE — ELECTRODE PT RET AD L9FT HI MOIST COND ADH HYDRGEL CORDED

## (undated) DEVICE — BANDAGE,GAUZE,BULKEE II,4.5"X4.1YD,STRL: Brand: MEDLINE

## (undated) DEVICE — MARKER SURG SKIN GENTIAN VLT REG TIP W/ 6IN RUL

## (undated) DEVICE — LABEL MED MINI W/ MARKER

## (undated) DEVICE — TUBING, SUCTION, 1/4" X 10', STRAIGHT: Brand: MEDLINE

## (undated) DEVICE — GOWN,AURORA,NONREINFORCED,LARGE: Brand: MEDLINE

## (undated) DEVICE — TRAY PREP DRY W/ PREM GLV 2 APPL 6 SPNG 2 UNDPD 1 OVERWRAP

## (undated) DEVICE — GLOVE SURG L12IN SZ 65FNGR THK94MIL TRNSLUC YEL LTX

## (undated) DEVICE — SPONGE,LAP,18"X18",DLX,XR,ST,5/PK,40/PK: Brand: MEDLINE